# Patient Record
Sex: FEMALE | Race: WHITE | NOT HISPANIC OR LATINO | Employment: OTHER | URBAN - METROPOLITAN AREA
[De-identification: names, ages, dates, MRNs, and addresses within clinical notes are randomized per-mention and may not be internally consistent; named-entity substitution may affect disease eponyms.]

---

## 2017-01-03 ENCOUNTER — HISTORICAL (OUTPATIENT)
Dept: ADMINISTRATIVE | Facility: HOSPITAL | Age: 77
End: 2017-01-03

## 2017-02-06 ENCOUNTER — HISTORICAL (OUTPATIENT)
Dept: ADMINISTRATIVE | Facility: HOSPITAL | Age: 77
End: 2017-02-06

## 2017-05-04 ENCOUNTER — HISTORICAL (OUTPATIENT)
Dept: ADMINISTRATIVE | Facility: HOSPITAL | Age: 77
End: 2017-05-04

## 2017-05-04 LAB
ABS NEUT (OLG): 8.11 X10(3)/MCL (ref 2.1–9.2)
ALBUMIN SERPL-MCNC: 3.9 GM/DL (ref 3.4–5)
ALBUMIN/GLOB SERPL: 1.1 RATIO (ref 1.1–2)
ALP SERPL-CCNC: 100 UNIT/L (ref 38–126)
ALT SERPL-CCNC: 19 UNIT/L (ref 12–78)
AST SERPL-CCNC: 17 UNIT/L (ref 15–37)
BASOPHILS # BLD AUTO: 0.1 X10(3)/MCL (ref 0–0.2)
BASOPHILS NFR BLD AUTO: 1 %
BILIRUB SERPL-MCNC: 0.3 MG/DL (ref 0.2–1)
BILIRUBIN DIRECT+TOT PNL SERPL-MCNC: 0.1 MG/DL (ref 0–0.5)
BILIRUBIN DIRECT+TOT PNL SERPL-MCNC: 0.2 MG/DL (ref 0–0.8)
BUN SERPL-MCNC: 23 MG/DL (ref 7–18)
CALCIUM SERPL-MCNC: 9.1 MG/DL (ref 8.5–10.1)
CHLORIDE SERPL-SCNC: 104 MMOL/L (ref 98–107)
CO2 SERPL-SCNC: 27 MMOL/L (ref 21–32)
CREAT SERPL-MCNC: 1.31 MG/DL (ref 0.55–1.02)
DEPRECATED CALCIDIOL+CALCIFEROL SERPL-MC: 32.93 NG/ML (ref 30–80)
EOSINOPHIL # BLD AUTO: 0.2 X10(3)/MCL (ref 0–0.9)
EOSINOPHIL NFR BLD AUTO: 2 %
ERYTHROCYTE [DISTWIDTH] IN BLOOD BY AUTOMATED COUNT: 13.9 % (ref 11.5–17)
GLOBULIN SER-MCNC: 3.4 GM/DL (ref 2.4–3.5)
GLUCOSE SERPL-MCNC: 122 MG/DL (ref 74–106)
HCT VFR BLD AUTO: 37.3 % (ref 37–47)
HGB BLD-MCNC: 11.4 GM/DL (ref 12–16)
IRON SATN MFR SERPL: 8 % (ref 20–50)
IRON SERPL-MCNC: 28 MCG/DL (ref 50–175)
LYMPHOCYTES # BLD AUTO: 3.6 X10(3)/MCL (ref 0.6–4.6)
LYMPHOCYTES NFR BLD AUTO: 28 %
MCH RBC QN AUTO: 26 PG (ref 27–31)
MCHC RBC AUTO-ENTMCNC: 30.6 GM/DL (ref 33–36)
MCV RBC AUTO: 85 FL (ref 80–94)
MONOCYTES # BLD AUTO: 0.9 X10(3)/MCL (ref 0.1–1.3)
MONOCYTES NFR BLD AUTO: 7 %
NEUTROPHILS # BLD AUTO: 8.11 X10(3)/MCL (ref 2.1–9.2)
NEUTROPHILS NFR BLD AUTO: 62 %
PLATELET # BLD AUTO: 469 X10(3)/MCL (ref 130–400)
PMV BLD AUTO: 11 FL (ref 9.4–12.4)
POTASSIUM SERPL-SCNC: 4.8 MMOL/L (ref 3.5–5.1)
PROT SERPL-MCNC: 7.3 GM/DL (ref 6.4–8.2)
RBC # BLD AUTO: 4.39 X10(6)/MCL (ref 4.2–5.4)
SODIUM SERPL-SCNC: 142 MMOL/L (ref 136–145)
TIBC SERPL-MCNC: 351 UG/DL
TRANSFERRIN SERPL-MCNC: 297 MG/DL (ref 200–360)
VIT B12 SERPL-MCNC: 364 PG/ML (ref 193–986)
WBC # SPEC AUTO: 13 X10(3)/MCL (ref 4.5–11.5)

## 2017-06-05 ENCOUNTER — HISTORICAL (OUTPATIENT)
Dept: ADMINISTRATIVE | Facility: HOSPITAL | Age: 77
End: 2017-06-05

## 2017-06-05 LAB
ABS NEUT (OLG): 6.52 X10(3)/MCL (ref 2.1–9.2)
BASOPHILS # BLD AUTO: 0.1 X10(3)/MCL (ref 0–0.2)
BASOPHILS NFR BLD AUTO: 1 %
EOSINOPHIL # BLD AUTO: 0.1 X10(3)/MCL (ref 0–0.9)
EOSINOPHIL NFR BLD AUTO: 1 %
ERYTHROCYTE [DISTWIDTH] IN BLOOD BY AUTOMATED COUNT: 14.6 % (ref 11.5–17)
HCT VFR BLD AUTO: 36.6 % (ref 37–47)
HGB BLD-MCNC: 11.3 GM/DL (ref 12–16)
IRON SATN MFR SERPL: 17.1 % (ref 20–50)
IRON SERPL-MCNC: 57 MCG/DL (ref 50–175)
LYMPHOCYTES # BLD AUTO: 3.3 X10(3)/MCL (ref 0.6–4.6)
LYMPHOCYTES NFR BLD AUTO: 30 %
MCH RBC QN AUTO: 26.1 PG (ref 27–31)
MCHC RBC AUTO-ENTMCNC: 30.9 GM/DL (ref 33–36)
MCV RBC AUTO: 84.5 FL (ref 80–94)
MONOCYTES # BLD AUTO: 0.7 X10(3)/MCL (ref 0.1–1.3)
MONOCYTES NFR BLD AUTO: 7 %
NEUTROPHILS # BLD AUTO: 6.52 X10(3)/MCL (ref 2.1–9.2)
NEUTROPHILS NFR BLD AUTO: 60 %
PLATELET # BLD AUTO: 418 X10(3)/MCL (ref 130–400)
PMV BLD AUTO: 11.5 FL (ref 9.4–12.4)
RBC # BLD AUTO: 4.33 X10(6)/MCL (ref 4.2–5.4)
TIBC SERPL-MCNC: 333 MCG/DL (ref 250–450)
TRANSFERRIN SERPL-MCNC: 271 MG/DL (ref 200–360)
WBC # SPEC AUTO: 10.8 X10(3)/MCL (ref 4.5–11.5)

## 2017-07-14 ENCOUNTER — HISTORICAL (OUTPATIENT)
Dept: ADMINISTRATIVE | Facility: HOSPITAL | Age: 77
End: 2017-07-14

## 2017-07-14 LAB
BUN SERPL-MCNC: 21 MG/DL (ref 7–18)
CALCIUM SERPL-MCNC: 8.8 MG/DL (ref 8.5–10.1)
CHLORIDE SERPL-SCNC: 103 MMOL/L (ref 98–107)
CHOLEST SERPL-MCNC: 137 MG/DL (ref 0–200)
CHOLEST/HDLC SERPL: 2.1 {RATIO} (ref 0–4)
CO2 SERPL-SCNC: 26 MMOL/L (ref 21–32)
CREAT SERPL-MCNC: 1.06 MG/DL (ref 0.55–1.02)
EST. AVERAGE GLUCOSE BLD GHB EST-MCNC: 183 MG/DL
GLUCOSE SERPL-MCNC: 200 MG/DL (ref 74–106)
HBA1C MFR BLD: 8 % (ref 4.2–6.3)
HDLC SERPL-MCNC: 64 MG/DL (ref 35–60)
LDLC SERPL CALC-MCNC: 41 MG/DL (ref 0–129)
POTASSIUM SERPL-SCNC: 4.5 MMOL/L (ref 3.5–5.1)
SODIUM SERPL-SCNC: 142 MMOL/L (ref 136–145)
TRIGL SERPL-MCNC: 158 MG/DL (ref 30–150)
VLDLC SERPL CALC-MCNC: 32 MG/DL

## 2017-09-30 LAB — RAPID GROUP A STREP (OHS): NEGATIVE

## 2017-10-16 ENCOUNTER — HISTORICAL (OUTPATIENT)
Dept: ADMINISTRATIVE | Facility: HOSPITAL | Age: 77
End: 2017-10-16

## 2017-10-16 LAB
BUN SERPL-MCNC: 20 MG/DL (ref 7–18)
CALCIUM SERPL-MCNC: 8.8 MG/DL (ref 8.5–10.1)
CHLORIDE SERPL-SCNC: 102 MMOL/L (ref 98–107)
CO2 SERPL-SCNC: 28 MMOL/L (ref 21–32)
CREAT SERPL-MCNC: 1.24 MG/DL (ref 0.55–1.02)
EST. AVERAGE GLUCOSE BLD GHB EST-MCNC: 169 MG/DL
GLUCOSE SERPL-MCNC: 80 MG/DL (ref 74–106)
HBA1C MFR BLD: 7.5 % (ref 4.2–6.3)
POTASSIUM SERPL-SCNC: 4.6 MMOL/L (ref 3.5–5.1)
SODIUM SERPL-SCNC: 139 MMOL/L (ref 136–145)

## 2017-12-06 ENCOUNTER — HISTORICAL (OUTPATIENT)
Dept: ADMINISTRATIVE | Facility: HOSPITAL | Age: 77
End: 2017-12-06

## 2017-12-06 LAB
BNP BLD-MCNC: 143 PG/ML (ref 0–125)
BUN SERPL-MCNC: 18 MG/DL (ref 7–18)
CALCIUM SERPL-MCNC: 8.3 MG/DL (ref 8.5–10.1)
CHLORIDE SERPL-SCNC: 104 MMOL/L (ref 98–107)
CO2 SERPL-SCNC: 24 MMOL/L (ref 21–32)
CREAT SERPL-MCNC: 1.13 MG/DL (ref 0.55–1.02)
GLUCOSE SERPL-MCNC: 146 MG/DL (ref 74–106)
POTASSIUM SERPL-SCNC: 4.6 MMOL/L (ref 3.5–5.1)
SODIUM SERPL-SCNC: 139 MMOL/L (ref 136–145)

## 2018-01-15 ENCOUNTER — HISTORICAL (OUTPATIENT)
Dept: ADMINISTRATIVE | Facility: HOSPITAL | Age: 78
End: 2018-01-15

## 2018-01-15 LAB
ABS NEUT (OLG): 5.1 X10(3)/MCL (ref 2.1–9.2)
ALBUMIN SERPL-MCNC: 3.6 GM/DL (ref 3.4–5)
ALBUMIN/GLOB SERPL: 1.1 RATIO (ref 1.1–2)
ALP SERPL-CCNC: 83 UNIT/L (ref 38–126)
ALT SERPL-CCNC: 17 UNIT/L (ref 12–78)
AST SERPL-CCNC: 10 UNIT/L (ref 15–37)
BASOPHILS # BLD AUTO: 0.1 X10(3)/MCL (ref 0–0.2)
BASOPHILS NFR BLD AUTO: 1 %
BILIRUB SERPL-MCNC: 0.4 MG/DL (ref 0.2–1)
BILIRUBIN DIRECT+TOT PNL SERPL-MCNC: 0.1 MG/DL (ref 0–0.5)
BILIRUBIN DIRECT+TOT PNL SERPL-MCNC: 0.3 MG/DL (ref 0–0.8)
BUN SERPL-MCNC: 17 MG/DL (ref 7–18)
CALCIUM SERPL-MCNC: 8.5 MG/DL (ref 8.5–10.1)
CHLORIDE SERPL-SCNC: 106 MMOL/L (ref 98–107)
CHOLEST SERPL-MCNC: 124 MG/DL (ref 0–200)
CHOLEST/HDLC SERPL: 2.1 {RATIO} (ref 0–4)
CO2 SERPL-SCNC: 27 MMOL/L (ref 21–32)
CREAT SERPL-MCNC: 1.1 MG/DL (ref 0.55–1.02)
EOSINOPHIL # BLD AUTO: 0.1 X10(3)/MCL (ref 0–0.9)
EOSINOPHIL NFR BLD AUTO: 1 %
ERYTHROCYTE [DISTWIDTH] IN BLOOD BY AUTOMATED COUNT: 12.9 % (ref 11.5–17)
EST. AVERAGE GLUCOSE BLD GHB EST-MCNC: 160 MG/DL
GLOBULIN SER-MCNC: 3.3 GM/DL (ref 2.4–3.5)
GLUCOSE SERPL-MCNC: 72 MG/DL (ref 74–106)
HBA1C MFR BLD: 7.2 % (ref 4.2–6.3)
HCT VFR BLD AUTO: 35.9 % (ref 37–47)
HDLC SERPL-MCNC: 59 MG/DL (ref 35–60)
HGB BLD-MCNC: 10.9 GM/DL (ref 12–16)
LDLC SERPL CALC-MCNC: 33 MG/DL (ref 0–129)
LYMPHOCYTES # BLD AUTO: 2.8 X10(3)/MCL (ref 0.6–4.6)
LYMPHOCYTES NFR BLD AUTO: 31 %
MCH RBC QN AUTO: 27.3 PG (ref 27–31)
MCHC RBC AUTO-ENTMCNC: 30.4 GM/DL (ref 33–36)
MCV RBC AUTO: 90 FL (ref 80–94)
MONOCYTES # BLD AUTO: 0.7 X10(3)/MCL (ref 0.1–1.3)
MONOCYTES NFR BLD AUTO: 8 %
NEUTROPHILS # BLD AUTO: 5.1 X10(3)/MCL (ref 1.4–7.9)
NEUTROPHILS NFR BLD AUTO: 58 %
PLATELET # BLD AUTO: 370 X10(3)/MCL (ref 130–400)
PMV BLD AUTO: 10.8 FL (ref 9.4–12.4)
POTASSIUM SERPL-SCNC: 4.6 MMOL/L (ref 3.5–5.1)
PROT SERPL-MCNC: 6.9 GM/DL (ref 6.4–8.2)
RBC # BLD AUTO: 3.99 X10(6)/MCL (ref 4.2–5.4)
SODIUM SERPL-SCNC: 142 MMOL/L (ref 136–145)
TRIGL SERPL-MCNC: 160 MG/DL (ref 30–150)
VLDLC SERPL CALC-MCNC: 32 MG/DL
WBC # SPEC AUTO: 8.8 X10(3)/MCL (ref 4.5–11.5)

## 2018-05-03 ENCOUNTER — HISTORICAL (OUTPATIENT)
Dept: ADMINISTRATIVE | Facility: HOSPITAL | Age: 78
End: 2018-05-03

## 2018-05-03 LAB
BUN SERPL-MCNC: 17 MG/DL (ref 7–18)
CALCIUM SERPL-MCNC: 8.9 MG/DL (ref 8.5–10.1)
CHLORIDE SERPL-SCNC: 104 MMOL/L (ref 98–107)
CO2 SERPL-SCNC: 29 MMOL/L (ref 21–32)
CREAT SERPL-MCNC: 1.1 MG/DL (ref 0.55–1.02)
CREAT/UREA NIT SERPL: 15.5
EST. AVERAGE GLUCOSE BLD GHB EST-MCNC: 209 MG/DL
GLUCOSE SERPL-MCNC: 134 MG/DL (ref 74–106)
HBA1C MFR BLD: 8.9 % (ref 4.2–6.3)
POTASSIUM SERPL-SCNC: 4.6 MMOL/L (ref 3.5–5.1)
SODIUM SERPL-SCNC: 141 MMOL/L (ref 136–145)

## 2018-07-03 ENCOUNTER — HISTORICAL (OUTPATIENT)
Dept: ADMINISTRATIVE | Facility: HOSPITAL | Age: 78
End: 2018-07-03

## 2018-07-03 LAB
BUN SERPL-MCNC: 19 MG/DL (ref 7–18)
CALCIUM SERPL-MCNC: 8.3 MG/DL (ref 8.5–10.1)
CHLORIDE SERPL-SCNC: 103 MMOL/L (ref 98–107)
CHOLEST SERPL-MCNC: 135 MG/DL (ref 0–200)
CHOLEST/HDLC SERPL: 2.7 {RATIO} (ref 0–4)
CO2 SERPL-SCNC: 28 MMOL/L (ref 21–32)
CREAT SERPL-MCNC: 1.12 MG/DL (ref 0.55–1.02)
CREAT/UREA NIT SERPL: 17
EST. AVERAGE GLUCOSE BLD GHB EST-MCNC: 171 MG/DL
GLUCOSE SERPL-MCNC: 101 MG/DL (ref 74–106)
HBA1C MFR BLD: 7.6 % (ref 4.2–6.3)
HDLC SERPL-MCNC: 50 MG/DL (ref 35–60)
LDLC SERPL CALC-MCNC: 53 MG/DL (ref 0–129)
POTASSIUM SERPL-SCNC: 4.5 MMOL/L (ref 3.5–5.1)
SODIUM SERPL-SCNC: 140 MMOL/L (ref 136–145)
TRIGL SERPL-MCNC: 159 MG/DL (ref 30–150)
VLDLC SERPL CALC-MCNC: 32 MG/DL

## 2019-02-06 ENCOUNTER — HISTORICAL (OUTPATIENT)
Dept: ADMINISTRATIVE | Facility: HOSPITAL | Age: 79
End: 2019-02-06

## 2019-02-06 LAB
ABS NEUT (OLG): 5.78 X10(3)/MCL (ref 2.1–9.2)
ALBUMIN SERPL-MCNC: 3.5 GM/DL (ref 3.4–5)
ALBUMIN/GLOB SERPL: 1 {RATIO}
ALP SERPL-CCNC: 92 UNIT/L (ref 38–126)
ALT SERPL-CCNC: 20 UNIT/L (ref 12–78)
APPEARANCE, UA: CLEAR
AST SERPL-CCNC: 12 UNIT/L (ref 15–37)
BACTERIA SPEC CULT: ABNORMAL /HPF
BASOPHILS # BLD AUTO: 0.1 X10(3)/MCL (ref 0–0.2)
BASOPHILS NFR BLD AUTO: 1 %
BILIRUB SERPL-MCNC: 0.4 MG/DL (ref 0.2–1)
BILIRUB UR QL STRIP: NEGATIVE
BILIRUBIN DIRECT+TOT PNL SERPL-MCNC: 0.1 MG/DL (ref 0–0.2)
BILIRUBIN DIRECT+TOT PNL SERPL-MCNC: 0.3 MG/DL (ref 0–0.8)
BUN SERPL-MCNC: 22 MG/DL (ref 7–18)
CALCIUM SERPL-MCNC: 8.7 MG/DL (ref 8.5–10.1)
CHLORIDE SERPL-SCNC: 103 MMOL/L (ref 98–107)
CHOLEST SERPL-MCNC: 181 MG/DL (ref 0–200)
CHOLEST/HDLC SERPL: 3.6 {RATIO} (ref 0–4)
CO2 SERPL-SCNC: 28 MMOL/L (ref 21–32)
COLOR UR: YELLOW
CREAT SERPL-MCNC: 1.31 MG/DL (ref 0.55–1.02)
EOSINOPHIL # BLD AUTO: 0.1 X10(3)/MCL (ref 0–0.9)
EOSINOPHIL NFR BLD AUTO: 1 %
ERYTHROCYTE [DISTWIDTH] IN BLOOD BY AUTOMATED COUNT: 13.2 % (ref 11.5–17)
EST. AVERAGE GLUCOSE BLD GHB EST-MCNC: 171 MG/DL
GLOBULIN SER-MCNC: 3.5 GM/DL (ref 2.4–3.5)
GLUCOSE (UA): NEGATIVE
GLUCOSE SERPL-MCNC: 93 MG/DL (ref 74–106)
HBA1C MFR BLD: 7.6 % (ref 4.2–6.3)
HCT VFR BLD AUTO: 40.2 % (ref 37–47)
HDLC SERPL-MCNC: 50 MG/DL (ref 35–60)
HGB BLD-MCNC: 12.3 GM/DL (ref 12–16)
HGB UR QL STRIP: NEGATIVE
KETONES UR QL STRIP: NEGATIVE
LDLC SERPL CALC-MCNC: 93 MG/DL (ref 0–129)
LEUKOCYTE ESTERASE UR QL STRIP: ABNORMAL
LYMPHOCYTES # BLD AUTO: 3.2 X10(3)/MCL (ref 0.6–4.6)
LYMPHOCYTES NFR BLD AUTO: 32 %
MCH RBC QN AUTO: 28.2 PG (ref 27–31)
MCHC RBC AUTO-ENTMCNC: 30.6 GM/DL (ref 33–36)
MCV RBC AUTO: 92.2 FL (ref 80–94)
MONOCYTES # BLD AUTO: 0.7 X10(3)/MCL (ref 0.1–1.3)
MONOCYTES NFR BLD AUTO: 7 %
NEUTROPHILS # BLD AUTO: 5.78 X10(3)/MCL (ref 2.1–9.2)
NEUTROPHILS NFR BLD AUTO: 58 %
NITRITE UR QL STRIP: NEGATIVE
PH UR STRIP: 5.5 [PH] (ref 5–9)
PLATELET # BLD AUTO: 390 X10(3)/MCL (ref 130–400)
PMV BLD AUTO: 10.9 FL (ref 9.4–12.4)
POTASSIUM SERPL-SCNC: 4.7 MMOL/L (ref 3.5–5.1)
PROT SERPL-MCNC: 7 GM/DL (ref 6.4–8.2)
PROT UR QL STRIP: NEGATIVE
RBC # BLD AUTO: 4.36 X10(6)/MCL (ref 4.2–5.4)
RBC #/AREA URNS HPF: ABNORMAL /[HPF]
SODIUM SERPL-SCNC: 139 MMOL/L (ref 136–145)
SP GR UR STRIP: 1.02 (ref 1–1.03)
SQUAMOUS EPITHELIAL, UA: ABNORMAL
TRIGL SERPL-MCNC: 188 MG/DL (ref 30–150)
UROBILINOGEN UR STRIP-ACNC: 0.2
VLDLC SERPL CALC-MCNC: 38 MG/DL
WBC # SPEC AUTO: 9.9 X10(3)/MCL (ref 4.5–11.5)
WBC #/AREA URNS HPF: ABNORMAL /[HPF]

## 2019-08-06 ENCOUNTER — HISTORICAL (OUTPATIENT)
Dept: ADMINISTRATIVE | Facility: HOSPITAL | Age: 79
End: 2019-08-06

## 2019-08-06 LAB
ABS NEUT (OLG): 5.65 X10(3)/MCL (ref 2.1–9.2)
BASOPHILS # BLD AUTO: 0.1 X10(3)/MCL (ref 0–0.2)
BASOPHILS NFR BLD AUTO: 1 %
BUN SERPL-MCNC: 21 MG/DL (ref 7–18)
CALCIUM SERPL-MCNC: 7.9 MG/DL (ref 8.5–10.1)
CHLORIDE SERPL-SCNC: 99 MMOL/L (ref 98–107)
CHOLEST SERPL-MCNC: 182 MG/DL (ref 0–200)
CHOLEST/HDLC SERPL: 3.5 {RATIO} (ref 0–4)
CO2 SERPL-SCNC: 28 MMOL/L (ref 21–32)
CREAT SERPL-MCNC: 1.31 MG/DL (ref 0.55–1.02)
CREAT/UREA NIT SERPL: 16
EOSINOPHIL # BLD AUTO: 0.1 X10(3)/MCL (ref 0–0.9)
EOSINOPHIL NFR BLD AUTO: 1 %
ERYTHROCYTE [DISTWIDTH] IN BLOOD BY AUTOMATED COUNT: 13 % (ref 11.5–17)
EST. AVERAGE GLUCOSE BLD GHB EST-MCNC: 192 MG/DL
GLUCOSE SERPL-MCNC: 298 MG/DL (ref 74–106)
HBA1C MFR BLD: 8.3 % (ref 4.2–6.3)
HCT VFR BLD AUTO: 39.8 % (ref 37–47)
HDLC SERPL-MCNC: 52 MG/DL (ref 35–60)
HGB BLD-MCNC: 12.4 GM/DL (ref 12–16)
LDLC SERPL CALC-MCNC: 78 MG/DL (ref 0–129)
LYMPHOCYTES # BLD AUTO: 2.7 X10(3)/MCL (ref 0.6–4.6)
LYMPHOCYTES NFR BLD AUTO: 30 %
MCH RBC QN AUTO: 28.4 PG (ref 27–31)
MCHC RBC AUTO-ENTMCNC: 31.2 GM/DL (ref 33–36)
MCV RBC AUTO: 91.3 FL (ref 80–94)
MONOCYTES # BLD AUTO: 0.6 X10(3)/MCL (ref 0.1–1.3)
MONOCYTES NFR BLD AUTO: 6 %
NEUTROPHILS # BLD AUTO: 5.65 X10(3)/MCL (ref 2.1–9.2)
NEUTROPHILS NFR BLD AUTO: 61 %
NRBC BLD AUTO-RTO: 0 % (ref 0–0.2)
PLATELET # BLD AUTO: 392 X10(3)/MCL (ref 130–400)
PMV BLD AUTO: 10.4 FL (ref 9.4–12.4)
POTASSIUM SERPL-SCNC: 5.2 MMOL/L (ref 3.5–5.1)
RBC # BLD AUTO: 4.36 X10(6)/MCL (ref 4.2–5.4)
SODIUM SERPL-SCNC: 135 MMOL/L (ref 136–145)
TRIGL SERPL-MCNC: 261 MG/DL (ref 30–150)
TSH SERPL-ACNC: 3.21 MIU/L (ref 0.36–3.74)
VLDLC SERPL CALC-MCNC: 52 MG/DL
WBC # SPEC AUTO: 9.2 X10(3)/MCL (ref 4.5–11.5)

## 2020-01-03 ENCOUNTER — HISTORICAL (OUTPATIENT)
Dept: ADMINISTRATIVE | Facility: HOSPITAL | Age: 80
End: 2020-01-03

## 2020-01-03 LAB
CHOLEST SERPL-MCNC: 192 MG/DL (ref 0–200)
CHOLEST/HDLC SERPL: 3.5 {RATIO} (ref 0–4)
HDLC SERPL-MCNC: 55 MG/DL (ref 35–60)
LDLC SERPL CALC-MCNC: 70 MG/DL (ref 0–129)
TRIGL SERPL-MCNC: 333 MG/DL (ref 30–150)
VLDLC SERPL CALC-MCNC: 67 MG/DL

## 2020-02-24 ENCOUNTER — HISTORICAL (OUTPATIENT)
Dept: ADMINISTRATIVE | Facility: HOSPITAL | Age: 80
End: 2020-02-24

## 2020-02-24 LAB
BUN SERPL-MCNC: 18 MG/DL (ref 7–18)
CALCIUM SERPL-MCNC: 8.5 MG/DL (ref 8.5–10.1)
CHLORIDE SERPL-SCNC: 100 MMOL/L (ref 98–107)
CHOLEST SERPL-MCNC: 175 MG/DL (ref 0–200)
CHOLEST/HDLC SERPL: 3.4 {RATIO} (ref 0–4)
CO2 SERPL-SCNC: 27 MMOL/L (ref 21–32)
CREAT SERPL-MCNC: 1.22 MG/DL (ref 0.55–1.02)
CREAT/UREA NIT SERPL: 14.8
EST. AVERAGE GLUCOSE BLD GHB EST-MCNC: 206 MG/DL
GLUCOSE SERPL-MCNC: 272 MG/DL (ref 74–106)
HBA1C MFR BLD: 8.8 % (ref 4.2–6.3)
HDLC SERPL-MCNC: 52 MG/DL (ref 35–60)
LDLC SERPL CALC-MCNC: 82 MG/DL (ref 0–129)
POTASSIUM SERPL-SCNC: 4.7 MMOL/L (ref 3.5–5.1)
SODIUM SERPL-SCNC: 136 MMOL/L (ref 136–145)
TRIGL SERPL-MCNC: 205 MG/DL (ref 30–150)
VLDLC SERPL CALC-MCNC: 41 MG/DL

## 2020-03-16 ENCOUNTER — HISTORICAL (OUTPATIENT)
Dept: RADIOLOGY | Facility: HOSPITAL | Age: 80
End: 2020-03-16

## 2020-04-22 ENCOUNTER — HISTORICAL (OUTPATIENT)
Dept: RADIOLOGY | Facility: HOSPITAL | Age: 80
End: 2020-04-22

## 2020-04-22 LAB — POC CREATININE: 1.3 MG/DL (ref 0.6–1.3)

## 2020-05-01 LAB
ABS NEUT (OLG): 6.6 X10(3)/MCL (ref 2.1–9.2)
ALBUMIN SERPL-MCNC: 3.7 GM/DL (ref 3.4–5)
ALBUMIN/GLOB SERPL: 1.1 RATIO (ref 1.1–2)
ALP SERPL-CCNC: 92 UNIT/L (ref 40–150)
ALT SERPL-CCNC: 17 UNIT/L (ref 0–55)
AST SERPL-CCNC: 19 UNIT/L (ref 5–34)
BASOPHILS # BLD AUTO: 0.1 X10(3)/MCL (ref 0–0.2)
BASOPHILS NFR BLD AUTO: 1 %
BILIRUB SERPL-MCNC: 0.3 MG/DL
BILIRUBIN DIRECT+TOT PNL SERPL-MCNC: 0.1 MG/DL (ref 0–0.5)
BILIRUBIN DIRECT+TOT PNL SERPL-MCNC: 0.2 MG/DL (ref 0–0.8)
BUN SERPL-MCNC: 18 MG/DL (ref 9.8–20.1)
CALCIUM SERPL-MCNC: 8.9 MG/DL (ref 8.4–10.2)
CHLORIDE SERPL-SCNC: 103 MMOL/L (ref 98–107)
CO2 SERPL-SCNC: 25 MMOL/L (ref 23–31)
CREAT SERPL-MCNC: 1.26 MG/DL (ref 0.55–1.02)
EOSINOPHIL # BLD AUTO: 0.1 X10(3)/MCL (ref 0–0.9)
EOSINOPHIL NFR BLD AUTO: 1 %
ERYTHROCYTE [DISTWIDTH] IN BLOOD BY AUTOMATED COUNT: 12.4 % (ref 11.5–17)
GLOBULIN SER-MCNC: 3.5 GM/DL (ref 2.4–3.5)
GLUCOSE SERPL-MCNC: 203 MG/DL (ref 82–115)
HCT VFR BLD AUTO: 40.5 % (ref 37–47)
HGB BLD-MCNC: 12.3 GM/DL (ref 12–16)
INR PPP: 1 (ref 0–1.3)
LYMPHOCYTES # BLD AUTO: 3.3 X10(3)/MCL (ref 0.6–4.6)
LYMPHOCYTES NFR BLD AUTO: 30 %
MCH RBC QN AUTO: 28.1 PG (ref 27–31)
MCHC RBC AUTO-ENTMCNC: 30.4 GM/DL (ref 33–36)
MCV RBC AUTO: 92.5 FL (ref 80–94)
MONOCYTES # BLD AUTO: 0.7 X10(3)/MCL (ref 0.1–1.3)
MONOCYTES NFR BLD AUTO: 6 %
NEUTROPHILS # BLD AUTO: 6.6 X10(3)/MCL (ref 2.1–9.2)
NEUTROPHILS NFR BLD AUTO: 61 %
PLATELET # BLD AUTO: 452 X10(3)/MCL (ref 130–400)
PMV BLD AUTO: 10.5 FL (ref 9.4–12.4)
POTASSIUM SERPL-SCNC: 5.2 MMOL/L (ref 3.5–5.1)
PROT SERPL-MCNC: 7.2 GM/DL (ref 5.8–7.6)
PROTHROMBIN TIME: 12.7 SECOND(S) (ref 11.1–13.7)
RBC # BLD AUTO: 4.38 X10(6)/MCL (ref 4.2–5.4)
SODIUM SERPL-SCNC: 140 MMOL/L (ref 136–145)
WBC # SPEC AUTO: 10.9 X10(3)/MCL (ref 4.5–11.5)

## 2020-05-06 ENCOUNTER — HISTORICAL (OUTPATIENT)
Dept: ADMINISTRATIVE | Facility: HOSPITAL | Age: 80
End: 2020-05-06

## 2020-05-06 LAB
ABS NEUT (OLG): 7.92 X10(3)/MCL (ref 2.1–9.2)
ALBUMIN SERPL-MCNC: 3.8 GM/DL (ref 3.4–4.8)
ALBUMIN/GLOB SERPL: 0.9 RATIO (ref 1.1–2)
ALP SERPL-CCNC: 85 UNIT/L (ref 40–150)
ALT SERPL-CCNC: 16 UNIT/L (ref 0–55)
APTT PPP: 34.9 SECOND(S) (ref 23.2–33.7)
AST SERPL-CCNC: 22 UNIT/L (ref 5–34)
BASOPHILS # BLD AUTO: 0.1 X10(3)/MCL (ref 0–0.2)
BASOPHILS NFR BLD AUTO: 1 %
BILIRUB SERPL-MCNC: 0.6 MG/DL
BILIRUBIN DIRECT+TOT PNL SERPL-MCNC: 0.2 MG/DL (ref 0–0.5)
BILIRUBIN DIRECT+TOT PNL SERPL-MCNC: 0.4 MG/DL (ref 0–0.8)
BUN SERPL-MCNC: 17 MG/DL (ref 9.8–20.1)
CALCIUM SERPL-MCNC: 8.7 MG/DL (ref 8.4–10.2)
CHLORIDE SERPL-SCNC: 101 MMOL/L (ref 98–107)
CO2 SERPL-SCNC: 19 MMOL/L (ref 23–31)
CREAT SERPL-MCNC: 1.31 MG/DL (ref 0.55–1.02)
EOSINOPHIL # BLD AUTO: 0.1 X10(3)/MCL (ref 0–0.9)
EOSINOPHIL NFR BLD AUTO: 1 %
ERYTHROCYTE [DISTWIDTH] IN BLOOD BY AUTOMATED COUNT: 12.7 % (ref 11.5–17)
GLOBULIN SER-MCNC: 4.3 GM/DL (ref 2.4–3.5)
GLUCOSE SERPL-MCNC: 336 MG/DL (ref 82–115)
HCT VFR BLD AUTO: 40.4 % (ref 37–47)
HGB BLD-MCNC: 12.6 GM/DL (ref 12–16)
INR PPP: 1 (ref 0–1.3)
LYMPHOCYTES # BLD AUTO: 3.5 X10(3)/MCL (ref 0.6–4.6)
LYMPHOCYTES NFR BLD AUTO: 28 %
MCH RBC QN AUTO: 28.6 PG (ref 27–31)
MCHC RBC AUTO-ENTMCNC: 31.2 GM/DL (ref 33–36)
MCV RBC AUTO: 91.6 FL (ref 80–94)
MONOCYTES # BLD AUTO: 1 X10(3)/MCL (ref 0.1–1.3)
MONOCYTES NFR BLD AUTO: 8 %
NEUTROPHILS # BLD AUTO: 7.92 X10(3)/MCL (ref 2.1–9.2)
NEUTROPHILS NFR BLD AUTO: 63 %
PLATELET # BLD AUTO: 460 X10(3)/MCL (ref 130–400)
PMV BLD AUTO: 10.8 FL (ref 9.4–12.4)
POTASSIUM SERPL-SCNC: 4.5 MMOL/L (ref 3.5–5.1)
PROT SERPL-MCNC: 8.1 GM/DL (ref 5.8–7.6)
PROTHROMBIN TIME: 12.9 SECOND(S) (ref 11.1–13.7)
RBC # BLD AUTO: 4.41 X10(6)/MCL (ref 4.2–5.4)
SODIUM SERPL-SCNC: 136 MMOL/L (ref 136–145)
WBC # SPEC AUTO: 12.7 X10(3)/MCL (ref 4.5–11.5)

## 2020-06-01 ENCOUNTER — HISTORICAL (OUTPATIENT)
Dept: ADMINISTRATIVE | Facility: HOSPITAL | Age: 80
End: 2020-06-01

## 2020-06-01 LAB
ALBUMIN SERPL-MCNC: 3.5 GM/DL (ref 3.4–4.8)
ALBUMIN/GLOB SERPL: 1.1 RATIO (ref 1.1–2)
ALP SERPL-CCNC: 83 UNIT/L (ref 40–150)
ALT SERPL-CCNC: 13 UNIT/L (ref 0–55)
AST SERPL-CCNC: 15 UNIT/L (ref 5–34)
BILIRUB SERPL-MCNC: 0.4 MG/DL
BILIRUBIN DIRECT+TOT PNL SERPL-MCNC: 0.2 MG/DL (ref 0–0.5)
BILIRUBIN DIRECT+TOT PNL SERPL-MCNC: 0.2 MG/DL (ref 0–0.8)
BUN SERPL-MCNC: 20.9 MG/DL (ref 9.8–20.1)
CALCIUM SERPL-MCNC: 7.8 MG/DL (ref 8.4–10.2)
CHLORIDE SERPL-SCNC: 103 MMOL/L (ref 98–107)
CO2 SERPL-SCNC: 23 MMOL/L (ref 23–31)
CREAT SERPL-MCNC: 1.47 MG/DL (ref 0.55–1.02)
EST. AVERAGE GLUCOSE BLD GHB EST-MCNC: 159.9 MG/DL
GLOBULIN SER-MCNC: 3.1 GM/DL (ref 2.4–3.5)
GLUCOSE SERPL-MCNC: 244 MG/DL (ref 82–115)
HBA1C MFR BLD: 7.2 %
POTASSIUM SERPL-SCNC: 5.3 MMOL/L (ref 3.5–5.1)
PROT SERPL-MCNC: 6.6 GM/DL (ref 5.8–7.6)
SODIUM SERPL-SCNC: 137 MMOL/L (ref 136–145)

## 2020-06-02 ENCOUNTER — HISTORICAL (OUTPATIENT)
Dept: ADMINISTRATIVE | Facility: HOSPITAL | Age: 80
End: 2020-06-02

## 2020-06-02 LAB
AMYLASE SERPL-CCNC: 25 UNIT/L (ref 20–160)
BUN SERPL-MCNC: 22.7 MG/DL (ref 9.8–20.1)
CALCIUM SERPL-MCNC: 8.4 MG/DL (ref 8.4–10.2)
CHLORIDE SERPL-SCNC: 105 MMOL/L (ref 98–107)
CO2 SERPL-SCNC: 24 MMOL/L (ref 23–31)
CREAT SERPL-MCNC: 1.25 MG/DL (ref 0.55–1.02)
CREAT/UREA NIT SERPL: 18
GLUCOSE SERPL-MCNC: 115 MG/DL (ref 82–115)
LIPASE SERPL-CCNC: 14 U/L
MAGNESIUM SERPL-MCNC: 0.96 MG/DL (ref 1.6–2.6)
POTASSIUM SERPL-SCNC: 5.2 MMOL/L (ref 3.5–5.1)
PTH-INTACT SERPL-MCNC: 96.3 PG/ML (ref 8.7–77.1)
SODIUM SERPL-SCNC: 141 MMOL/L (ref 136–145)
TSH SERPL-ACNC: 2.32 UIU/ML (ref 0.35–4.94)

## 2020-06-24 ENCOUNTER — HISTORICAL (OUTPATIENT)
Dept: ADMINISTRATIVE | Facility: HOSPITAL | Age: 80
End: 2020-06-24

## 2020-06-24 LAB — MAGNESIUM SERPL-MCNC: 1.25 MG/DL (ref 1.6–2.6)

## 2020-07-02 ENCOUNTER — HISTORICAL (OUTPATIENT)
Dept: ADMINISTRATIVE | Facility: HOSPITAL | Age: 80
End: 2020-07-02

## 2020-07-02 LAB — MAGNESIUM SERPL-MCNC: 1.6 MG/DL (ref 1.6–2.6)

## 2020-08-21 ENCOUNTER — HISTORICAL (OUTPATIENT)
Dept: RADIOLOGY | Facility: HOSPITAL | Age: 80
End: 2020-08-21

## 2020-08-21 LAB — POC CREATININE: 1.4 MG/DL (ref 0.6–1.3)

## 2020-08-25 ENCOUNTER — HISTORICAL (OUTPATIENT)
Dept: ADMINISTRATIVE | Facility: HOSPITAL | Age: 80
End: 2020-08-25

## 2020-08-25 LAB
ABS NEUT (OLG): 6.23 X10(3)/MCL (ref 2.1–9.2)
BASOPHILS # BLD AUTO: 0.1 X10(3)/MCL (ref 0–0.2)
BASOPHILS NFR BLD AUTO: 1 %
EOSINOPHIL # BLD AUTO: 0.1 X10(3)/MCL (ref 0–0.9)
EOSINOPHIL NFR BLD AUTO: 1 %
ERYTHROCYTE [DISTWIDTH] IN BLOOD BY AUTOMATED COUNT: 12.5 % (ref 11.5–17)
HCT VFR BLD AUTO: 39.1 % (ref 37–47)
HGB BLD-MCNC: 12.2 GM/DL (ref 12–16)
LYMPHOCYTES # BLD AUTO: 2.9 X10(3)/MCL (ref 0.6–4.6)
LYMPHOCYTES NFR BLD AUTO: 28 %
MAGNESIUM SERPL-MCNC: 1.4 MG/DL (ref 1.6–2.6)
MCH RBC QN AUTO: 28.6 PG (ref 27–31)
MCHC RBC AUTO-ENTMCNC: 31.2 GM/DL (ref 33–36)
MCV RBC AUTO: 91.6 FL (ref 80–94)
MONOCYTES # BLD AUTO: 0.8 X10(3)/MCL (ref 0.1–1.3)
MONOCYTES NFR BLD AUTO: 8 %
NEUTROPHILS # BLD AUTO: 6.23 X10(3)/MCL (ref 2.1–9.2)
NEUTROPHILS NFR BLD AUTO: 61 %
PLATELET # BLD AUTO: 406 X10(3)/MCL (ref 130–400)
PMV BLD AUTO: 10.4 FL (ref 9.4–12.4)
RBC # BLD AUTO: 4.27 X10(6)/MCL (ref 4.2–5.4)
WBC # SPEC AUTO: 10.2 X10(3)/MCL (ref 4.5–11.5)

## 2020-09-09 ENCOUNTER — HISTORICAL (OUTPATIENT)
Dept: ADMINISTRATIVE | Facility: HOSPITAL | Age: 80
End: 2020-09-09

## 2020-09-09 LAB
CHOLEST SERPL-MCNC: 178 MG/DL
CHOLEST/HDLC SERPL: 4 {RATIO} (ref 0–5)
HDLC SERPL-MCNC: 47 MG/DL (ref 35–60)
LDLC SERPL CALC-MCNC: 77 MG/DL (ref 50–140)
TRIGL SERPL-MCNC: 271 MG/DL (ref 37–140)
VLDLC SERPL CALC-MCNC: 54 MG/DL

## 2021-02-23 ENCOUNTER — HISTORICAL (OUTPATIENT)
Dept: ADMINISTRATIVE | Facility: HOSPITAL | Age: 81
End: 2021-02-23

## 2021-02-23 LAB
ABS NEUT (OLG): 6.72 X10(3)/MCL (ref 2.1–9.2)
ALBUMIN SERPL-MCNC: 3.8 GM/DL (ref 3.4–4.8)
ALBUMIN/GLOB SERPL: 1.2 RATIO (ref 1.1–2)
ALP SERPL-CCNC: 82 UNIT/L (ref 40–150)
ALT SERPL-CCNC: 15 UNIT/L (ref 0–55)
AMYLASE SERPL-CCNC: 21 UNIT/L (ref 20–160)
AST SERPL-CCNC: 18 UNIT/L (ref 5–34)
BASOPHILS # BLD AUTO: 0.2 X10(3)/MCL (ref 0–0.2)
BASOPHILS NFR BLD AUTO: 1 %
BILIRUB SERPL-MCNC: 0.4 MG/DL
BILIRUBIN DIRECT+TOT PNL SERPL-MCNC: 0.2 MG/DL (ref 0–0.5)
BILIRUBIN DIRECT+TOT PNL SERPL-MCNC: 0.2 MG/DL (ref 0–0.8)
BUN SERPL-MCNC: 18.1 MG/DL (ref 9.8–20.1)
CALCIUM SERPL-MCNC: 8.3 MG/DL (ref 8.4–10.2)
CHLORIDE SERPL-SCNC: 107 MMOL/L (ref 98–107)
CO2 SERPL-SCNC: 24 MMOL/L (ref 23–31)
CREAT SERPL-MCNC: 1.4 MG/DL (ref 0.55–1.02)
EOSINOPHIL # BLD AUTO: 0.2 X10(3)/MCL (ref 0–0.9)
EOSINOPHIL NFR BLD AUTO: 2 %
ERYTHROCYTE [DISTWIDTH] IN BLOOD BY AUTOMATED COUNT: 13.2 % (ref 11.5–17)
GLOBULIN SER-MCNC: 3.2 GM/DL (ref 2.4–3.5)
GLUCOSE SERPL-MCNC: 41 MG/DL (ref 82–115)
HCT VFR BLD AUTO: 38.7 % (ref 37–47)
HGB BLD-MCNC: 11.8 GM/DL (ref 12–16)
LIPASE SERPL-CCNC: 7 U/L
LYMPHOCYTES # BLD AUTO: 4.1 X10(3)/MCL (ref 0.6–4.6)
LYMPHOCYTES NFR BLD AUTO: 34 %
MCH RBC QN AUTO: 28.4 PG (ref 27–31)
MCHC RBC AUTO-ENTMCNC: 30.5 GM/DL (ref 33–36)
MCV RBC AUTO: 93.3 FL (ref 80–94)
MONOCYTES # BLD AUTO: 1 X10(3)/MCL (ref 0.1–1.3)
MONOCYTES NFR BLD AUTO: 8 %
NEUTROPHILS # BLD AUTO: 6.72 X10(3)/MCL (ref 2.1–9.2)
NEUTROPHILS NFR BLD AUTO: 55 %
PLATELET # BLD AUTO: 427 X10(3)/MCL (ref 130–400)
PMV BLD AUTO: 10.8 FL (ref 9.4–12.4)
POTASSIUM SERPL-SCNC: 4.8 MMOL/L (ref 3.5–5.1)
PROT SERPL-MCNC: 7 GM/DL (ref 5.8–7.6)
RBC # BLD AUTO: 4.15 X10(6)/MCL (ref 4.2–5.4)
SODIUM SERPL-SCNC: 143 MMOL/L (ref 136–145)
WBC # SPEC AUTO: 12.3 X10(3)/MCL (ref 4.5–11.5)

## 2021-03-15 ENCOUNTER — HISTORICAL (OUTPATIENT)
Dept: ADMINISTRATIVE | Facility: HOSPITAL | Age: 81
End: 2021-03-15

## 2021-03-15 LAB
ABS NEUT (OLG): 6.02 X10(3)/MCL (ref 2.1–9.2)
ALBUMIN SERPL-MCNC: 3.7 GM/DL (ref 3.4–4.8)
ALBUMIN/GLOB SERPL: 1.2 RATIO (ref 1.1–2)
ALP SERPL-CCNC: 94 UNIT/L (ref 40–150)
ALT SERPL-CCNC: 14 UNIT/L (ref 0–55)
APPEARANCE, UA: CLEAR
AST SERPL-CCNC: 15 UNIT/L (ref 5–34)
BACTERIA SPEC CULT: ABNORMAL /HPF
BASOPHILS # BLD AUTO: 0.1 X10(3)/MCL (ref 0–0.2)
BASOPHILS NFR BLD AUTO: 1 %
BILIRUB SERPL-MCNC: 0.3 MG/DL
BILIRUB UR QL STRIP: NEGATIVE
BILIRUBIN DIRECT+TOT PNL SERPL-MCNC: 0.1 MG/DL (ref 0–0.5)
BILIRUBIN DIRECT+TOT PNL SERPL-MCNC: 0.2 MG/DL (ref 0–0.8)
BUN SERPL-MCNC: 19.7 MG/DL (ref 9.8–20.1)
CALCIUM SERPL-MCNC: 8.4 MG/DL (ref 8.4–10.2)
CHLORIDE SERPL-SCNC: 103 MMOL/L (ref 98–107)
CO2 SERPL-SCNC: 25 MMOL/L (ref 23–31)
COLOR UR: YELLOW
CREAT SERPL-MCNC: 1.4 MG/DL (ref 0.55–1.02)
DEPRECATED CALCIDIOL+CALCIFEROL SERPL-MC: 51.4 NG/ML (ref 30–80)
EOSINOPHIL # BLD AUTO: 0.2 X10(3)/MCL (ref 0–0.9)
EOSINOPHIL NFR BLD AUTO: 2 %
ERYTHROCYTE [DISTWIDTH] IN BLOOD BY AUTOMATED COUNT: 13.2 % (ref 11.5–17)
EST. AVERAGE GLUCOSE BLD GHB EST-MCNC: 165.7 MG/DL
GLOBULIN SER-MCNC: 3.1 GM/DL (ref 2.4–3.5)
GLUCOSE (UA): NEGATIVE
GLUCOSE SERPL-MCNC: 268 MG/DL (ref 82–115)
HBA1C MFR BLD: 7.4 %
HCT VFR BLD AUTO: 38.9 % (ref 37–47)
HGB BLD-MCNC: 11.9 GM/DL (ref 12–16)
HGB UR QL STRIP: NEGATIVE
KETONES UR QL STRIP: NEGATIVE
LEUKOCYTE ESTERASE UR QL STRIP: ABNORMAL
LYMPHOCYTES # BLD AUTO: 2.5 X10(3)/MCL (ref 0.6–4.6)
LYMPHOCYTES NFR BLD AUTO: 26 %
MCH RBC QN AUTO: 28.1 PG (ref 27–31)
MCHC RBC AUTO-ENTMCNC: 30.6 GM/DL (ref 33–36)
MCV RBC AUTO: 91.7 FL (ref 80–94)
MONOCYTES # BLD AUTO: 0.7 X10(3)/MCL (ref 0.1–1.3)
MONOCYTES NFR BLD AUTO: 7 %
NEUTROPHILS # BLD AUTO: 6.02 X10(3)/MCL (ref 2.1–9.2)
NEUTROPHILS NFR BLD AUTO: 63 %
NITRITE UR QL STRIP: NEGATIVE
PH UR STRIP: 6 [PH] (ref 5–9)
PLATELET # BLD AUTO: 413 X10(3)/MCL (ref 130–400)
PMV BLD AUTO: 11 FL (ref 9.4–12.4)
POTASSIUM SERPL-SCNC: 5.8 MMOL/L (ref 3.5–5.1)
PROT SERPL-MCNC: 6.8 GM/DL (ref 5.8–7.6)
PROT UR QL STRIP: NEGATIVE
RBC # BLD AUTO: 4.24 X10(6)/MCL (ref 4.2–5.4)
RBC #/AREA URNS HPF: ABNORMAL /[HPF]
SODIUM SERPL-SCNC: 138 MMOL/L (ref 136–145)
SP GR UR STRIP: 1.01 (ref 1–1.03)
SQUAMOUS EPITHELIAL, UA: ABNORMAL
TSH SERPL-ACNC: 2.9 UIU/ML (ref 0.35–4.94)
UROBILINOGEN UR STRIP-ACNC: 0.2
WBC # SPEC AUTO: 9.6 X10(3)/MCL (ref 4.5–11.5)
WBC #/AREA URNS HPF: 11 /HPF (ref 0–3)

## 2021-03-22 ENCOUNTER — HISTORICAL (OUTPATIENT)
Dept: ADMINISTRATIVE | Facility: HOSPITAL | Age: 81
End: 2021-03-22

## 2021-03-22 LAB
BUN SERPL-MCNC: 20.8 MG/DL (ref 9.8–20.1)
CALCIUM SERPL-MCNC: 9.1 MG/DL (ref 8.4–10.2)
CHLORIDE SERPL-SCNC: 97 MMOL/L (ref 98–107)
CO2 SERPL-SCNC: 26 MMOL/L (ref 23–31)
CREAT SERPL-MCNC: 1.53 MG/DL (ref 0.55–1.02)
CREAT/UREA NIT SERPL: 14
GLUCOSE SERPL-MCNC: 137 MG/DL (ref 82–115)
POTASSIUM SERPL-SCNC: 4.6 MMOL/L (ref 3.5–5.1)
SODIUM SERPL-SCNC: 137 MMOL/L (ref 136–145)

## 2021-04-05 ENCOUNTER — HISTORICAL (OUTPATIENT)
Dept: RADIOLOGY | Facility: HOSPITAL | Age: 81
End: 2021-04-05

## 2021-04-05 LAB — POC CREATININE: 1.6 MG/DL (ref 0.6–1.3)

## 2021-05-19 ENCOUNTER — HISTORICAL (OUTPATIENT)
Dept: ADMINISTRATIVE | Facility: HOSPITAL | Age: 81
End: 2021-05-19

## 2021-05-19 LAB
APPEARANCE, UA: CLEAR
BACTERIA SPEC CULT: ABNORMAL /HPF
BILIRUB UR QL STRIP: NEGATIVE
COLOR UR: YELLOW
CREAT UR-MCNC: 70.9 MG/DL (ref 45–106)
GLUCOSE (UA): NEGATIVE
HGB UR QL STRIP: NEGATIVE
KETONES UR QL STRIP: NEGATIVE
LEUKOCYTE ESTERASE UR QL STRIP: ABNORMAL
NITRITE UR QL STRIP: NEGATIVE
PH UR STRIP: 5.5 [PH] (ref 5–9)
PROT UR QL STRIP: ABNORMAL
RBC #/AREA URNS HPF: ABNORMAL /[HPF]
SP GR UR STRIP: 1.01 (ref 1–1.03)
SQUAMOUS EPITHELIAL, UA: ABNORMAL /HPF (ref 0–4)
UROBILINOGEN UR STRIP-ACNC: 0.2
WBC #/AREA URNS HPF: ABNORMAL /[HPF]

## 2021-09-21 ENCOUNTER — HISTORICAL (OUTPATIENT)
Dept: ADMINISTRATIVE | Facility: HOSPITAL | Age: 81
End: 2021-09-21

## 2021-09-21 LAB
ABS NEUT (OLG): 6.22 X10(3)/MCL (ref 2.1–9.2)
ALBUMIN SERPL-MCNC: 3.8 GM/DL (ref 3.4–4.8)
ALBUMIN/GLOB SERPL: 1.2 RATIO (ref 1.1–2)
ALP SERPL-CCNC: 88 UNIT/L (ref 40–150)
ALT SERPL-CCNC: 14 UNIT/L (ref 0–55)
APPEARANCE, UA: CLEAR
AST SERPL-CCNC: 16 UNIT/L (ref 5–34)
BACTERIA SPEC CULT: ABNORMAL /HPF
BASOPHILS # BLD AUTO: 0.1 X10(3)/MCL (ref 0–0.2)
BASOPHILS NFR BLD AUTO: 1 %
BILIRUB SERPL-MCNC: 0.3 MG/DL
BILIRUB UR QL STRIP: NEGATIVE
BILIRUBIN DIRECT+TOT PNL SERPL-MCNC: 0.1 MG/DL (ref 0–0.5)
BILIRUBIN DIRECT+TOT PNL SERPL-MCNC: 0.2 MG/DL (ref 0–0.8)
BUN SERPL-MCNC: 20.4 MG/DL (ref 9.8–20.1)
CALCIUM SERPL-MCNC: 9.2 MG/DL (ref 8.4–10.2)
CHLORIDE SERPL-SCNC: 103 MMOL/L (ref 98–107)
CHOLEST SERPL-MCNC: 155 MG/DL
CHOLEST/HDLC SERPL: 3 {RATIO} (ref 0–5)
CO2 SERPL-SCNC: 21 MMOL/L (ref 23–31)
COLOR UR: YELLOW
CREAT SERPL-MCNC: 1.33 MG/DL (ref 0.55–1.02)
EOSINOPHIL # BLD AUTO: 0.2 X10(3)/MCL (ref 0–0.9)
EOSINOPHIL NFR BLD AUTO: 2 %
ERYTHROCYTE [DISTWIDTH] IN BLOOD BY AUTOMATED COUNT: 12.9 % (ref 11.5–17)
EST. AVERAGE GLUCOSE BLD GHB EST-MCNC: 174.3 MG/DL
GLOBULIN SER-MCNC: 3.2 GM/DL (ref 2.4–3.5)
GLUCOSE (UA): NEGATIVE
GLUCOSE SERPL-MCNC: 224 MG/DL (ref 82–115)
HBA1C MFR BLD: 7.7 %
HCT VFR BLD AUTO: 36.9 % (ref 37–47)
HDLC SERPL-MCNC: 49 MG/DL (ref 35–60)
HGB BLD-MCNC: 11.7 GM/DL (ref 12–16)
HGB UR QL STRIP: NEGATIVE
KETONES UR QL STRIP: NEGATIVE
LDLC SERPL CALC-MCNC: 66 MG/DL (ref 50–140)
LEUKOCYTE ESTERASE UR QL STRIP: ABNORMAL
LYMPHOCYTES # BLD AUTO: 2.8 X10(3)/MCL (ref 0.6–4.6)
LYMPHOCYTES NFR BLD AUTO: 28 %
MCH RBC QN AUTO: 28.6 PG (ref 27–31)
MCHC RBC AUTO-ENTMCNC: 31.7 GM/DL (ref 33–36)
MCV RBC AUTO: 90.2 FL (ref 80–94)
MONOCYTES # BLD AUTO: 0.8 X10(3)/MCL (ref 0.1–1.3)
MONOCYTES NFR BLD AUTO: 8 %
NEUTROPHILS # BLD AUTO: 6.22 X10(3)/MCL (ref 2.1–9.2)
NEUTROPHILS NFR BLD AUTO: 61 %
NITRITE UR QL STRIP: NEGATIVE
PH UR STRIP: 5 [PH] (ref 5–9)
PLATELET # BLD AUTO: 446 X10(3)/MCL (ref 130–400)
PMV BLD AUTO: 10.9 FL (ref 9.4–12.4)
POTASSIUM SERPL-SCNC: 5.2 MMOL/L (ref 3.5–5.1)
PROT SERPL-MCNC: 7 GM/DL (ref 5.8–7.6)
PROT UR QL STRIP: ABNORMAL
RBC # BLD AUTO: 4.09 X10(6)/MCL (ref 4.2–5.4)
RBC #/AREA URNS HPF: ABNORMAL /[HPF]
SODIUM SERPL-SCNC: 135 MMOL/L (ref 136–145)
SP GR UR STRIP: 1.02 (ref 1–1.03)
SQUAMOUS EPITHELIAL, UA: ABNORMAL /HPF (ref 0–4)
TRIGL SERPL-MCNC: 200 MG/DL (ref 37–140)
UROBILINOGEN UR STRIP-ACNC: 0.2
VLDLC SERPL CALC-MCNC: 40 MG/DL
WBC # SPEC AUTO: 10.2 X10(3)/MCL (ref 4.5–11.5)
WBC #/AREA URNS HPF: 5 /HPF (ref 0–3)

## 2021-10-05 ENCOUNTER — HISTORICAL (OUTPATIENT)
Dept: RADIOLOGY | Facility: HOSPITAL | Age: 81
End: 2021-10-05

## 2021-10-20 ENCOUNTER — HISTORICAL (OUTPATIENT)
Dept: HEPATOLOGY | Facility: HOSPITAL | Age: 81
End: 2021-10-20

## 2022-01-01 ENCOUNTER — DOCUMENTATION ONLY (OUTPATIENT)
Dept: SURGICAL ONCOLOGY | Facility: CLINIC | Age: 82
End: 2022-01-01

## 2022-01-01 ENCOUNTER — HOSPITAL ENCOUNTER (OUTPATIENT)
Dept: RADIOLOGY | Facility: HOSPITAL | Age: 82
Discharge: HOME | End: 2022-11-29
Attending: SURGERY
Payer: MEDICARE

## 2022-01-01 ENCOUNTER — DOCUMENTATION ONLY (OUTPATIENT)
Dept: INTERNAL MEDICINE | Facility: CLINIC | Age: 82
End: 2022-01-01
Payer: MEDICARE

## 2022-01-01 ENCOUNTER — TELEPHONE (OUTPATIENT)
Dept: INTERNAL MEDICINE | Facility: CLINIC | Age: 82
End: 2022-01-01
Payer: MEDICARE

## 2022-01-01 ENCOUNTER — OFFICE VISIT (OUTPATIENT)
Dept: INTERNAL MEDICINE | Facility: CLINIC | Age: 82
End: 2022-01-01
Payer: MEDICARE

## 2022-01-01 ENCOUNTER — OFFICE VISIT (OUTPATIENT)
Dept: SURGICAL ONCOLOGY | Facility: CLINIC | Age: 82
End: 2022-01-01
Payer: MEDICARE

## 2022-01-01 ENCOUNTER — DOCUMENTATION ONLY (OUTPATIENT)
Dept: ADMINISTRATIVE | Facility: HOSPITAL | Age: 82
End: 2022-01-01
Payer: MEDICARE

## 2022-01-01 ENCOUNTER — APPOINTMENT (OUTPATIENT)
Dept: LAB | Facility: HOSPITAL | Age: 82
End: 2022-01-01
Attending: INTERNAL MEDICINE
Payer: MEDICARE

## 2022-01-01 ENCOUNTER — HISTORICAL (OUTPATIENT)
Dept: ADMINISTRATIVE | Facility: HOSPITAL | Age: 82
End: 2022-01-01
Payer: MEDICARE

## 2022-01-01 VITALS
TEMPERATURE: 97 F | WEIGHT: 199 LBS | HEART RATE: 72 BPM | BODY MASS INDEX: 36.62 KG/M2 | OXYGEN SATURATION: 95 % | HEIGHT: 62 IN | SYSTOLIC BLOOD PRESSURE: 139 MMHG | DIASTOLIC BLOOD PRESSURE: 84 MMHG

## 2022-01-01 VITALS
SYSTOLIC BLOOD PRESSURE: 112 MMHG | HEART RATE: 98 BPM | HEIGHT: 62 IN | BODY MASS INDEX: 35.81 KG/M2 | WEIGHT: 194.63 LBS | DIASTOLIC BLOOD PRESSURE: 64 MMHG

## 2022-01-01 DIAGNOSIS — I10 PRIMARY HYPERTENSION: Chronic | ICD-10-CM

## 2022-01-01 DIAGNOSIS — F32.A ANXIETY AND DEPRESSION: Chronic | ICD-10-CM

## 2022-01-01 DIAGNOSIS — N18.32 STAGE 3B CHRONIC KIDNEY DISEASE: Primary | Chronic | ICD-10-CM

## 2022-01-01 DIAGNOSIS — E11.9 DIABETES MELLITUS WITHOUT COMPLICATION: Primary | ICD-10-CM

## 2022-01-01 DIAGNOSIS — F41.9 ANXIETY AND DEPRESSION: Chronic | ICD-10-CM

## 2022-01-01 DIAGNOSIS — R80.9 PROTEINURIA, UNSPECIFIED TYPE: ICD-10-CM

## 2022-01-01 DIAGNOSIS — F51.01 PRIMARY INSOMNIA: Chronic | ICD-10-CM

## 2022-01-01 DIAGNOSIS — Z79.4 ENCOUNTER FOR LONG-TERM (CURRENT) USE OF INSULIN: ICD-10-CM

## 2022-01-01 DIAGNOSIS — M54.9 BACK PAIN, UNSPECIFIED BACK LOCATION, UNSPECIFIED BACK PAIN LATERALITY, UNSPECIFIED CHRONICITY: Primary | ICD-10-CM

## 2022-01-01 DIAGNOSIS — K86.2 PANCREATIC CYST: ICD-10-CM

## 2022-01-01 DIAGNOSIS — I10 ESSENTIAL HYPERTENSION, MALIGNANT: ICD-10-CM

## 2022-01-01 DIAGNOSIS — E87.5 HYPERKALEMIA: ICD-10-CM

## 2022-01-01 DIAGNOSIS — E78.2 MIXED HYPERLIPIDEMIA: Chronic | ICD-10-CM

## 2022-01-01 DIAGNOSIS — G25.81 RESTLESS LEG SYNDROME: Chronic | ICD-10-CM

## 2022-01-01 DIAGNOSIS — N18.32 STAGE 3B CHRONIC KIDNEY DISEASE: ICD-10-CM

## 2022-01-01 DIAGNOSIS — K21.9 GASTROESOPHAGEAL REFLUX DISEASE WITHOUT ESOPHAGITIS: Chronic | ICD-10-CM

## 2022-01-01 DIAGNOSIS — Z23 NEED FOR VACCINATION: ICD-10-CM

## 2022-01-01 DIAGNOSIS — I10 PRIMARY HYPERTENSION: Primary | Chronic | ICD-10-CM

## 2022-01-01 DIAGNOSIS — K86.2 PANCREATIC CYST: Primary | ICD-10-CM

## 2022-01-01 DIAGNOSIS — E11.65 TYPE 2 DIABETES MELLITUS WITH HYPERGLYCEMIA, WITH LONG-TERM CURRENT USE OF INSULIN: Chronic | ICD-10-CM

## 2022-01-01 DIAGNOSIS — Z79.4 TYPE 2 DIABETES MELLITUS WITH HYPERGLYCEMIA, WITH LONG-TERM CURRENT USE OF INSULIN: Chronic | ICD-10-CM

## 2022-01-01 DIAGNOSIS — E78.5 HYPERLIPIDEMIA, UNSPECIFIED HYPERLIPIDEMIA TYPE: ICD-10-CM

## 2022-01-01 DIAGNOSIS — E87.5 HYPERKALEMIA: Primary | ICD-10-CM

## 2022-01-01 DIAGNOSIS — Z79.4 TYPE 2 DIABETES MELLITUS WITHOUT COMPLICATION, WITH LONG-TERM CURRENT USE OF INSULIN: Primary | Chronic | ICD-10-CM

## 2022-01-01 DIAGNOSIS — E11.9 TYPE 2 DIABETES MELLITUS WITHOUT COMPLICATION, WITH LONG-TERM CURRENT USE OF INSULIN: Primary | Chronic | ICD-10-CM

## 2022-01-01 LAB
ALBUMIN SERPL-MCNC: 3.7 GM/DL (ref 3.4–4.8)
ALBUMIN/GLOB SERPL: 1.2 RATIO (ref 1.1–2)
ALP SERPL-CCNC: 71 UNIT/L (ref 40–150)
ALT SERPL-CCNC: 11 UNIT/L (ref 0–55)
ANION GAP SERPL CALC-SCNC: 11 MEQ/L
AST SERPL-CCNC: 15 UNIT/L (ref 5–34)
BCS RECOMMENDATION EXT: NORMAL
BILIRUBIN DIRECT+TOT PNL SERPL-MCNC: 0.4 MG/DL
BUN SERPL-MCNC: 18.9 MG/DL (ref 9.8–20.1)
BUN SERPL-MCNC: 22.5 MG/DL (ref 9.8–20.1)
CALCIUM SERPL-MCNC: 9.5 MG/DL (ref 8.4–10.2)
CALCIUM SERPL-MCNC: 9.6 MG/DL (ref 8.4–10.2)
CHLORIDE SERPL-SCNC: 101 MMOL/L (ref 98–107)
CHLORIDE SERPL-SCNC: 101 MMOL/L (ref 98–107)
CHOLEST SERPL-MSCNC: 141 MG/DL (ref 0–200)
CO2 SERPL-SCNC: 24 MMOL/L (ref 23–31)
CO2 SERPL-SCNC: 26 MMOL/L (ref 23–31)
CREAT SERPL-MCNC: 1.41 MG/DL (ref 0.55–1.02)
CREAT SERPL-MCNC: 1.42 MG/DL (ref 0.55–1.02)
CREAT UR-MCNC: 65.5 MG/DL (ref 47–110)
CREAT/UREA NIT SERPL: 16
EST. AVERAGE GLUCOSE BLD GHB EST-MCNC: 174.3 MG/DL
GFR SERPLBLD CREATININE-BSD FMLA CKD-EPI: 37 MLS/MIN/1.73/M2
GFR SERPLBLD CREATININE-BSD FMLA CKD-EPI: 38 MLS/MIN/1.73/M2
GLOBULIN SER-MCNC: 3.1 GM/DL (ref 2.4–3.5)
GLUCOSE SERPL-MCNC: 206 MG/DL (ref 82–115)
GLUCOSE SERPL-MCNC: 233 MG/DL (ref 82–115)
HBA1C MFR BLD: 7.3 % (ref 4–6)
HBA1C MFR BLD: 7.7 %
HDLC SERPL-MCNC: 53 MG/DL (ref 35–70)
LDL CHOLESTEROL DIRECT: 55 MG/DL
LEFT EYE DM RETINOPATHY: POSITIVE
MICROALBUMIN UR-MCNC: 149.2 UG/ML
MICROALBUMIN/CREAT RATIO PNL UR: 227.8 MG/GM CR (ref 0–30)
POTASSIUM SERPL-SCNC: 5.1 MMOL/L (ref 3.5–5.1)
POTASSIUM SERPL-SCNC: 5.7 MMOL/L (ref 3.5–5.1)
PROT SERPL-MCNC: 6.8 GM/DL (ref 5.8–7.6)
RIGHT EYE DM RETINOPATHY: POSITIVE
SODIUM SERPL-SCNC: 136 MMOL/L (ref 136–145)
SODIUM SERPL-SCNC: 136 MMOL/L (ref 136–145)
TRIGL SERPL-MCNC: 217 MG/DL (ref 40–160)

## 2022-01-01 PROCEDURE — 36415 COLL VENOUS BLD VENIPUNCTURE: CPT

## 2022-01-01 PROCEDURE — 99214 OFFICE O/P EST MOD 30 MIN: CPT | Mod: S$PBB,,, | Performed by: SURGERY

## 2022-01-01 PROCEDURE — 99214 OFFICE O/P EST MOD 30 MIN: CPT | Mod: ,,,

## 2022-01-01 PROCEDURE — G0008 FLU VACCINE - QUADRIVALENT - ADJUVANTED: ICD-10-PCS | Mod: ,,,

## 2022-01-01 PROCEDURE — 83036 HEMOGLOBIN GLYCOSYLATED A1C: CPT

## 2022-01-01 PROCEDURE — 82043 UR ALBUMIN QUANTITATIVE: CPT

## 2022-01-01 PROCEDURE — 99999 PR PBB SHADOW E&M-EST. PATIENT-LVL IV: ICD-10-PCS | Mod: PBBFAC,,, | Performed by: SURGERY

## 2022-01-01 PROCEDURE — 99214 PR OFFICE/OUTPT VISIT, EST, LEVL IV, 30-39 MIN: ICD-10-PCS | Mod: S$PBB,,, | Performed by: SURGERY

## 2022-01-01 PROCEDURE — 80053 COMPREHEN METABOLIC PANEL: CPT

## 2022-01-01 PROCEDURE — 74183 MRI ABD W/O CNTR FLWD CNTR: CPT | Mod: TC

## 2022-01-01 PROCEDURE — 90694 FLU VACCINE - QUADRIVALENT - ADJUVANTED: ICD-10-PCS | Mod: ,,,

## 2022-01-01 PROCEDURE — 90694 VACC AIIV4 NO PRSRV 0.5ML IM: CPT | Mod: ,,,

## 2022-01-01 PROCEDURE — 99214 PR OFFICE/OUTPT VISIT, EST, LEVL IV, 30-39 MIN: ICD-10-PCS | Mod: ,,,

## 2022-01-01 PROCEDURE — 99214 OFFICE O/P EST MOD 30 MIN: CPT | Mod: PBBFAC | Performed by: SURGERY

## 2022-01-01 PROCEDURE — G0008 ADMIN INFLUENZA VIRUS VAC: HCPCS | Mod: ,,,

## 2022-01-01 PROCEDURE — 99999 PR PBB SHADOW E&M-EST. PATIENT-LVL IV: CPT | Mod: PBBFAC,,, | Performed by: SURGERY

## 2022-01-01 PROCEDURE — A9577 INJ MULTIHANCE: HCPCS | Performed by: SURGERY

## 2022-01-01 PROCEDURE — 80048 BASIC METABOLIC PNL TOTAL CA: CPT

## 2022-01-01 PROCEDURE — 25500020 PHARM REV CODE 255: Performed by: SURGERY

## 2022-01-01 RX ORDER — MELOXICAM 7.5 MG/1
7.5 TABLET ORAL 2 TIMES DAILY
COMMUNITY
Start: 2022-07-26 | End: 2022-01-01

## 2022-01-01 RX ORDER — AMLODIPINE BESYLATE 5 MG/1
5 TABLET ORAL DAILY
Status: ON HOLD | COMMUNITY
Start: 2022-01-01 | End: 2023-01-01 | Stop reason: HOSPADM

## 2022-01-01 RX ORDER — CYCLOBENZAPRINE HCL 10 MG
10 TABLET ORAL 2 TIMES DAILY PRN
Qty: 20 TABLET | Refills: 0 | Status: SHIPPED | OUTPATIENT
Start: 2022-01-01 | End: 2022-01-01

## 2022-01-01 RX ORDER — HYDRALAZINE HYDROCHLORIDE 25 MG/1
TABLET, FILM COATED ORAL
Status: ON HOLD | COMMUNITY
Start: 2022-01-01 | End: 2023-01-01 | Stop reason: HOSPADM

## 2022-01-01 RX ORDER — ESCITALOPRAM OXALATE 10 MG/1
10 TABLET ORAL DAILY
COMMUNITY
Start: 2022-01-01 | End: 2022-01-01

## 2022-01-01 RX ORDER — METFORMIN HYDROCHLORIDE 500 MG/1
TABLET ORAL
COMMUNITY
Start: 2022-01-01 | End: 2023-01-01

## 2022-01-01 RX ORDER — METOPROLOL SUCCINATE 50 MG/1
TABLET, EXTENDED RELEASE ORAL
Qty: 60 TABLET | Refills: 4 | Status: SHIPPED | OUTPATIENT
Start: 2022-01-01 | End: 2023-01-01

## 2022-01-01 RX ORDER — AZITHROMYCIN 250 MG/1
TABLET, FILM COATED ORAL
Qty: 6 TABLET | Refills: 0 | Status: SHIPPED | OUTPATIENT
Start: 2022-01-01 | End: 2022-01-01

## 2022-01-01 RX ORDER — DAPAGLIFLOZIN 5 MG/1
5 TABLET, FILM COATED ORAL DAILY
Qty: 30 TABLET | Refills: 2 | Status: SHIPPED | OUTPATIENT
Start: 2022-01-01 | End: 2022-01-01

## 2022-01-01 RX ORDER — FLUOXETINE HYDROCHLORIDE 60 MG/1
60 TABLET, FILM COATED ORAL; ORAL DAILY
Qty: 90 TABLET | Refills: 1 | Status: ON HOLD | OUTPATIENT
Start: 2022-01-01 | End: 2023-01-01 | Stop reason: HOSPADM

## 2022-01-01 RX ORDER — GABAPENTIN 100 MG/1
100 CAPSULE ORAL 3 TIMES DAILY
COMMUNITY
Start: 2022-01-01 | End: 2023-01-01

## 2022-01-01 RX ORDER — BENZONATATE 200 MG/1
200 CAPSULE ORAL 3 TIMES DAILY PRN
Qty: 30 CAPSULE | Refills: 6 | Status: SHIPPED | OUTPATIENT
Start: 2022-01-01 | End: 2023-01-01

## 2022-01-01 RX ORDER — CYCLOBENZAPRINE HCL 10 MG
10 TABLET ORAL 3 TIMES DAILY PRN
Qty: 30 TABLET | Refills: 0 | Status: SHIPPED | OUTPATIENT
Start: 2022-01-01 | End: 2022-01-01

## 2022-01-01 RX ADMIN — GADOBENATE DIMEGLUMINE 19 ML: 529 INJECTION, SOLUTION INTRAVENOUS at 10:11

## 2022-02-21 ENCOUNTER — HISTORICAL (OUTPATIENT)
Dept: ADMINISTRATIVE | Facility: HOSPITAL | Age: 82
End: 2022-02-21

## 2022-03-15 ENCOUNTER — HISTORICAL (OUTPATIENT)
Dept: ADMINISTRATIVE | Facility: HOSPITAL | Age: 82
End: 2022-03-15

## 2022-03-15 LAB
APPEARANCE, UA: CLEAR
BACTERIA SPEC CULT: NORMAL
BILIRUB UR QL STRIP: NEGATIVE
BUDDING YEAST: NORMAL
CASTS, UA: NORMAL
COLOR UR: YELLOW
CRYSTALS: NORMAL
GLUCOSE (UA): NEGATIVE
HGB UR QL STRIP: NEGATIVE
KETONES UR QL STRIP: NEGATIVE
LEUKOCYTE ESTERASE UR QL STRIP: NORMAL
NITRITE UR QL STRIP: NEGATIVE
PH UR STRIP: 5 [PH] (ref 5–9)
PROT UR QL STRIP: NORMAL
RBC #/AREA URNS HPF: NORMAL /[HPF] (ref 0–2)
SMALL ROUND CELLS, UA: NORMAL
SP GR UR STRIP: 1.02 (ref 1–1.03)
SPERM URNS QL MICRO: NORMAL
SQUAMOUS EPITHELIAL, UA: 7 (ref 0–4)
UROBILINOGEN UR STRIP-ACNC: 0.2
WBC #/AREA URNS HPF: 10 /[HPF] (ref 0–3)

## 2022-03-30 ENCOUNTER — HISTORICAL (OUTPATIENT)
Dept: ADMINISTRATIVE | Facility: HOSPITAL | Age: 82
End: 2022-03-30

## 2022-03-30 LAB
ABS NEUT (OLG): 5.49 (ref 2.1–9.2)
ALBUMIN SERPL-MCNC: 3.6 G/DL (ref 3.4–4.8)
ALBUMIN/GLOB SERPL: 1.2 {RATIO} (ref 1.1–2)
ALP SERPL-CCNC: 85 U/L (ref 40–150)
ALT SERPL-CCNC: 10 U/L (ref 0–55)
APPEARANCE, UA: CLEAR
AST SERPL-CCNC: 13 U/L (ref 5–34)
BACTERIA SPEC CULT: NORMAL
BASOPHILS # BLD AUTO: 0.1 10*3/UL (ref 0–0.2)
BASOPHILS NFR BLD AUTO: 1 %
BILIRUB SERPL-MCNC: 0.3 MG/DL
BILIRUB UR QL STRIP: NEGATIVE
BILIRUBIN DIRECT+TOT PNL SERPL-MCNC: 0.1 (ref 0–0.8)
BILIRUBIN DIRECT+TOT PNL SERPL-MCNC: 0.2 (ref 0–0.5)
BUDDING YEAST: NORMAL
BUN SERPL-MCNC: 15.3 MG/DL (ref 9.8–20.1)
CALCIUM SERPL-MCNC: 8.9 MG/DL (ref 8.7–10.5)
CASTS, UA: NORMAL
CHLORIDE SERPL-SCNC: 97 MMOL/L (ref 98–107)
CHOLEST SERPL-MCNC: 159 MG/DL
CHOLEST/HDLC SERPL: 3 {RATIO} (ref 0–5)
CO2 SERPL-SCNC: 24 MMOL/L (ref 23–31)
COLOR UR: YELLOW
CREAT SERPL-MCNC: 1.52 MG/DL (ref 0.55–1.02)
CRYSTALS: NORMAL
EOSINOPHIL # BLD AUTO: 0.8 10*3/UL (ref 0–0.9)
EOSINOPHIL NFR BLD AUTO: 8 %
ERYTHROCYTE [DISTWIDTH] IN BLOOD BY AUTOMATED COUNT: 13.2 % (ref 11.5–17)
EST. AVERAGE GLUCOSE BLD GHB EST-MCNC: 157.1 MG/DL
GLOBULIN SER-MCNC: 3.1 G/DL (ref 2.4–3.5)
GLUCOSE (UA): NORMAL
GLUCOSE SERPL-MCNC: 229 MG/DL (ref 82–115)
HBA1C MFR BLD: 7.1 %
HCT VFR BLD AUTO: 36.5 % (ref 37–47)
HDLC SERPL-MCNC: 51 MG/DL (ref 35–60)
HEMOLYSIS INTERF INDEX SERPL-ACNC: 1
HGB BLD-MCNC: 11.5 G/DL (ref 12–16)
HGB UR QL STRIP: NEGATIVE
ICTERIC INTERF INDEX SERPL-ACNC: 0
KETONES UR QL STRIP: NEGATIVE
LDLC SERPL CALC-MCNC: 64 MG/DL (ref 50–140)
LEUKOCYTE ESTERASE UR QL STRIP: NORMAL
LIPEMIC INTERF INDEX SERPL-ACNC: 0
LYMPHOCYTES # BLD AUTO: 2.6 10*3/UL (ref 0.6–4.6)
LYMPHOCYTES NFR BLD AUTO: 27 %
MANUAL DIFF? (OHS): NO
MCH RBC QN AUTO: 28 PG (ref 27–31)
MCHC RBC AUTO-ENTMCNC: 31.5 G/DL (ref 33–36)
MCV RBC AUTO: 88.8 FL (ref 80–94)
MONOCYTES # BLD AUTO: 0.7 10*3/UL (ref 0.1–1.3)
MONOCYTES NFR BLD AUTO: 7 %
NEUTROPHILS # BLD AUTO: 5.49 10*3/UL (ref 2.1–9.2)
NEUTROPHILS NFR BLD AUTO: 57 %
NITRITE UR QL STRIP: NEGATIVE
PH UR STRIP: 5.5 [PH] (ref 5–9)
PLATELET # BLD AUTO: 523 10*3/UL (ref 130–400)
PMV BLD AUTO: 10 FL (ref 9.4–12.4)
POTASSIUM SERPL-SCNC: 5 MMOL/L (ref 3.5–5.1)
PROT SERPL-MCNC: 6.7 G/DL (ref 5.8–7.6)
PROT UR QL STRIP: NORMAL
RBC # BLD AUTO: 4.11 10*6/UL (ref 4.2–5.4)
RBC #/AREA URNS HPF: NORMAL /[HPF] (ref 0–2)
SMALL ROUND CELLS, UA: PRESENT
SODIUM SERPL-SCNC: 134 MMOL/L (ref 136–145)
SP GR UR STRIP: 1.01 (ref 1–1.03)
SPERM URNS QL MICRO: NORMAL
SQUAMOUS EPITHELIAL, UA: NORMAL (ref 0–4)
TRIGL SERPL-MCNC: 221 MG/DL (ref 37–140)
TSH SERPL-ACNC: 2.84 M[IU]/L (ref 0.35–4.94)
UROBILINOGEN UR STRIP-ACNC: 0.2
VLDLC SERPL CALC-MCNC: 44 MG/DL
WBC # SPEC AUTO: 9.7 10*3/UL (ref 4.5–11.5)
WBC #/AREA URNS HPF: 12 /[HPF] (ref 0–3)

## 2022-04-11 ENCOUNTER — HISTORICAL (OUTPATIENT)
Dept: ADMINISTRATIVE | Facility: HOSPITAL | Age: 82
End: 2022-04-11
Payer: MEDICARE

## 2022-04-26 ENCOUNTER — HISTORICAL (OUTPATIENT)
Dept: ADMINISTRATIVE | Facility: HOSPITAL | Age: 82
End: 2022-04-26
Payer: MEDICARE

## 2022-04-26 ENCOUNTER — HISTORICAL (OUTPATIENT)
Dept: RADIOLOGY | Facility: HOSPITAL | Age: 82
End: 2022-04-26
Payer: MEDICARE

## 2022-04-26 LAB — POC CREATININE: 1.4 (ref 0.6–1.3)

## 2022-04-28 VITALS
HEIGHT: 62 IN | OXYGEN SATURATION: 96 % | WEIGHT: 193.56 LBS | DIASTOLIC BLOOD PRESSURE: 80 MMHG | SYSTOLIC BLOOD PRESSURE: 161 MMHG | BODY MASS INDEX: 35.62 KG/M2

## 2022-05-23 RX ORDER — ZOLPIDEM TARTRATE 5 MG/1
5 TABLET ORAL NIGHTLY PRN
Qty: 30 TABLET | Refills: 5 | Status: SHIPPED | OUTPATIENT
Start: 2022-05-23 | End: 2022-01-01

## 2022-05-23 RX ORDER — ZOLPIDEM TARTRATE 5 MG/1
5 TABLET ORAL NIGHTLY PRN
COMMUNITY
Start: 2022-04-25 | End: 2022-05-23 | Stop reason: SDUPTHER

## 2022-05-24 ENCOUNTER — OFFICE VISIT (OUTPATIENT)
Dept: SURGICAL ONCOLOGY | Facility: CLINIC | Age: 82
End: 2022-05-24
Payer: MEDICARE

## 2022-05-24 VITALS
HEART RATE: 92 BPM | WEIGHT: 197 LBS | SYSTOLIC BLOOD PRESSURE: 145 MMHG | HEIGHT: 62 IN | BODY MASS INDEX: 36.25 KG/M2 | DIASTOLIC BLOOD PRESSURE: 79 MMHG

## 2022-05-24 DIAGNOSIS — K86.2 PANCREATIC CYST: Primary | ICD-10-CM

## 2022-05-24 PROCEDURE — 99214 OFFICE O/P EST MOD 30 MIN: CPT | Mod: S$PBB,,, | Performed by: SURGERY

## 2022-05-24 PROCEDURE — 99999 PR PBB SHADOW E&M-EST. PATIENT-LVL II: CPT | Mod: PBBFAC,,, | Performed by: SURGERY

## 2022-05-24 PROCEDURE — 99212 OFFICE O/P EST SF 10 MIN: CPT | Mod: PBBFAC | Performed by: SURGERY

## 2022-05-24 PROCEDURE — 99214 PR OFFICE/OUTPT VISIT, EST, LEVL IV, 30-39 MIN: ICD-10-PCS | Mod: S$PBB,,, | Performed by: SURGERY

## 2022-05-24 PROCEDURE — 99999 PR PBB SHADOW E&M-EST. PATIENT-LVL II: ICD-10-PCS | Mod: PBBFAC,,, | Performed by: SURGERY

## 2022-05-24 RX ORDER — CEFDINIR 300 MG/1
300 CAPSULE ORAL EVERY 12 HOURS
COMMUNITY
Start: 2022-02-23 | End: 2022-01-01

## 2022-05-24 RX ORDER — BLOOD-GLUCOSE METER
EACH MISCELLANEOUS
COMMUNITY
Start: 2022-03-24

## 2022-05-24 RX ORDER — LANCETS
EACH MISCELLANEOUS
COMMUNITY
Start: 2022-03-24

## 2022-05-24 RX ORDER — FAMOTIDINE 40 MG/1
40 TABLET, FILM COATED ORAL NIGHTLY
COMMUNITY
Start: 2022-02-07

## 2022-05-24 RX ORDER — PREDNISONE 20 MG/1
40 TABLET ORAL 2 TIMES DAILY
COMMUNITY
Start: 2022-02-15 | End: 2022-01-01

## 2022-05-24 RX ORDER — DIPHENOXYLATE HYDROCHLORIDE AND ATROPINE SULFATE 2.5; .025 MG/1; MG/1
1 TABLET ORAL EVERY 6 HOURS
Status: ON HOLD | COMMUNITY
Start: 2021-12-16 | End: 2023-01-01 | Stop reason: HOSPADM

## 2022-05-24 RX ORDER — FLUOXETINE HYDROCHLORIDE 40 MG/1
CAPSULE ORAL DAILY
COMMUNITY
Start: 2022-04-25 | End: 2022-01-01

## 2022-05-24 RX ORDER — ROPINIROLE 1 MG/1
1 TABLET, FILM COATED ORAL DAILY PRN
COMMUNITY
Start: 2022-04-25 | End: 2022-06-27 | Stop reason: SDUPTHER

## 2022-05-24 RX ORDER — TRIAMCINOLONE ACETONIDE 1 MG/G
CREAM TOPICAL NIGHTLY
COMMUNITY
Start: 2022-01-14

## 2022-05-24 RX ORDER — INSULIN DETEMIR 100 [IU]/ML
INJECTION, SOLUTION SUBCUTANEOUS
COMMUNITY
Start: 2022-05-16 | End: 2023-01-01 | Stop reason: SDUPTHER

## 2022-05-24 RX ORDER — AZELASTINE 1 MG/ML
2 SPRAY, METERED NASAL DAILY
COMMUNITY
Start: 2022-04-04

## 2022-05-24 RX ORDER — SUCRALFATE 1 G/1
1 TABLET ORAL 4 TIMES DAILY
COMMUNITY
Start: 2021-12-23

## 2022-05-24 RX ORDER — SIMVASTATIN 10 MG/1
TABLET, FILM COATED ORAL
COMMUNITY
Start: 2022-05-02 | End: 2022-07-25 | Stop reason: SDUPTHER

## 2022-05-24 RX ORDER — CODEINE PHOSPHATE AND GUAIFENESIN 10; 100 MG/5ML; MG/5ML
SOLUTION ORAL
COMMUNITY
Start: 2022-04-07 | End: 2022-01-01

## 2022-05-24 RX ORDER — OLMESARTAN MEDOXOMIL AND HYDROCHLOROTHIAZIDE 40/25 40; 25 MG/1; MG/1
1 TABLET ORAL EVERY MORNING
Status: ON HOLD | COMMUNITY
Start: 2022-04-25 | End: 2023-01-01 | Stop reason: HOSPADM

## 2022-05-24 RX ORDER — NIFEDIPINE 90 MG/1
90 TABLET, EXTENDED RELEASE ORAL DAILY
COMMUNITY
Start: 2022-05-23 | End: 2022-01-01

## 2022-05-24 RX ORDER — BLOOD SUGAR DIAGNOSTIC
STRIP MISCELLANEOUS
COMMUNITY
Start: 2022-04-04 | End: 2023-01-01 | Stop reason: SDUPTHER

## 2022-05-24 RX ORDER — FLUTICASONE PROPIONATE 50 MCG
2 SPRAY, SUSPENSION (ML) NASAL DAILY
COMMUNITY
Start: 2021-12-31

## 2022-05-24 RX ORDER — AZITHROMYCIN 250 MG/1
TABLET, FILM COATED ORAL
COMMUNITY
Start: 2022-04-07 | End: 2022-01-01

## 2022-05-24 RX ORDER — INSULIN ASPART 100 [IU]/ML
INJECTION, SOLUTION INTRAVENOUS; SUBCUTANEOUS
COMMUNITY
Start: 2022-03-31 | End: 2022-07-05 | Stop reason: SDUPTHER

## 2022-05-24 RX ORDER — ONDANSETRON 4 MG/1
4 TABLET, FILM COATED ORAL EVERY 6 HOURS PRN
COMMUNITY
Start: 2022-03-07 | End: 2023-01-01

## 2022-05-24 RX ORDER — PROMETHAZINE HYDROCHLORIDE 25 MG/1
25-50 TABLET ORAL EVERY 6 HOURS PRN
COMMUNITY
Start: 2022-05-03 | End: 2022-01-01

## 2022-05-24 RX ORDER — NYSTATIN 100000 U/G
CREAM TOPICAL 2 TIMES DAILY
COMMUNITY
Start: 2022-01-14

## 2022-05-24 RX ORDER — BENZONATATE 100 MG/1
100 CAPSULE ORAL EVERY 4 HOURS PRN
COMMUNITY
Start: 2022-02-15 | End: 2022-01-01

## 2022-05-24 RX ORDER — HYDROXYZINE HYDROCHLORIDE 25 MG/1
25 TABLET, FILM COATED ORAL DAILY PRN
COMMUNITY
Start: 2022-04-11 | End: 2022-07-18 | Stop reason: SDUPTHER

## 2022-05-24 RX ORDER — METOPROLOL SUCCINATE 50 MG/1
50 TABLET, EXTENDED RELEASE ORAL 2 TIMES DAILY
COMMUNITY
Start: 2022-05-02 | End: 2022-01-01

## 2022-05-24 RX ORDER — PANTOPRAZOLE SODIUM 40 MG/1
40 TABLET, DELAYED RELEASE ORAL DAILY
COMMUNITY
Start: 2022-05-23

## 2022-05-24 RX ORDER — ALPRAZOLAM 0.25 MG/1
0.25 TABLET ORAL DAILY PRN
COMMUNITY
Start: 2022-04-28 | End: 2022-01-01 | Stop reason: SDUPTHER

## 2022-05-24 NOTE — PROGRESS NOTES
Chief complaint:  Follow-up pancreatic cyst, ductal dilation.       HPI:  82-year-old female who presented with mild epigastric abdominal discomfort and no prior history of pancreatitis.  MRI revealed dilation of the majority of the main pancreatic duct up to 11 mm with pancreatic atrophy, no evidence of pancreatic mass.  She underwent EUS and this revealed diffuse dilation of the main pancreatic duct and side branches.  There was an area of pancreatic duct transition in the head of the gland, FNA was performed of this area and showed no evidence of malignant cells.    She has been undergoing surveillance and presents today without complaint.  She denies abdominal pain, diarrhea, weight loss or anorexia.  Surveillance MRI was performed, I personally reviewed and interpreted the images viewed were.  There are stable changes in the pancreas with main duct dilation to 10 mm, no associated mass lesions or other suspicious or worrisome findings.  I discussed these results with her in detail today and answered her questions.        Past Medical and Surgical History  Allergies :   Patient has no known allergies.    @[facility]@  Medical :   She has a past medical history of Altered mental status, Diabetes, Hyperlipidemia, Hypertension, and Restless leg syndrome.    Surgical :   She has a past surgical history that includes Gastrointestinal Biopsy; Total knee arthroplasty; Hemorrhoid surgery; Tubal ligation; Excision of Pancreas; and Excision of Stomach.     Family History  Her family history is not on file.    Social History  She      Review of Systems   Constitutional: Negative for appetite change, chills, diaphoresis and fever.   HENT: Negative for congestion, drooling, ear discharge, ear pain and hearing loss.    Eyes: Negative for discharge.   Respiratory: Negative for apnea, cough, choking, chest tightness, shortness of breath and stridor.    Cardiovascular: Negative for chest pain, palpitations and leg swelling.    Endocrine: Negative for cold intolerance and heat intolerance.   Genitourinary: Negative for difficulty urinating, dyspareunia, dysuria and hematuria.   Musculoskeletal: Negative for arthralgias, gait problem and joint swelling.   Skin: Negative for color change and rash.   Neurological: Negative for dizziness, tremors, seizures, syncope, facial asymmetry, speech difficulty, light-headedness, numbness and headaches.   Psychiatric/Behavioral: Negative for agitation and confusion.        Objective   Physical Exam  Constitutional:       General: She is not in acute distress.     Appearance: Normal appearance. She is normal weight. She is not toxic-appearing.   HENT:      Head: Normocephalic and atraumatic.      Right Ear: External ear normal.      Left Ear: External ear normal.      Nose: Nose normal.      Mouth/Throat:      Mouth: Mucous membranes are moist.      Pharynx: Oropharynx is clear.   Eyes:      General: No scleral icterus.     Conjunctiva/sclera: Conjunctivae normal.      Pupils: Pupils are equal, round, and reactive to light.   Cardiovascular:      Rate and Rhythm: Normal rate and regular rhythm.      Pulses: Normal pulses.      Heart sounds: Normal heart sounds. No murmur heard.    No gallop.   Pulmonary:      Effort: Pulmonary effort is normal.      Breath sounds: Normal breath sounds. No stridor. No wheezing or rhonchi.   Chest:      Chest wall: No tenderness.   Breasts:      Right: No swelling, bleeding, inverted nipple, mass, nipple discharge, skin change, tenderness, axillary adenopathy or supraclavicular adenopathy.      Left: No swelling, bleeding, inverted nipple, mass, nipple discharge, skin change, tenderness, axillary adenopathy or supraclavicular adenopathy.       Musculoskeletal:         General: No swelling, tenderness, deformity or signs of injury. Normal range of motion.      Cervical back: Normal range of motion and neck supple.      Right lower leg: No edema.      Left lower leg: No  "edema.   Lymphadenopathy:      Upper Body:      Right upper body: No supraclavicular or axillary adenopathy.      Left upper body: No supraclavicular or axillary adenopathy.   Skin:     Capillary Refill: Capillary refill takes less than 2 seconds.      Coloration: Skin is not jaundiced or pale.      Findings: No erythema.   Neurological:      General: No focal deficit present.      Mental Status: She is alert and oriented to person, place, and time.      Cranial Nerves: No cranial nerve deficit.      Motor: No weakness.      Gait: Gait normal.   Psychiatric:         Mood and Affect: Mood normal.         Behavior: Behavior normal.       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  The patient was previously presented at multidisciplinary GI tumor Board and we agreed that given her age and comorbidities close observation was most prudent.    Today she remains asymptomatic, MRI shows stable changes without worrisome or suspicious findings.    Return to clinic in 6 months with repeat surveillance MRI    "

## 2022-06-27 DIAGNOSIS — R25.2 LEG CRAMPS: Primary | ICD-10-CM

## 2022-06-27 RX ORDER — ROPINIROLE 1 MG/1
1 TABLET, FILM COATED ORAL DAILY PRN
Qty: 90 TABLET | Refills: 1 | Status: SHIPPED | OUTPATIENT
Start: 2022-06-27 | End: 2022-01-01

## 2022-07-05 DIAGNOSIS — Z79.4 TYPE 2 DIABETES MELLITUS WITHOUT COMPLICATION, WITH LONG-TERM CURRENT USE OF INSULIN: Primary | ICD-10-CM

## 2022-07-05 DIAGNOSIS — E11.9 TYPE 2 DIABETES MELLITUS WITHOUT COMPLICATION, WITH LONG-TERM CURRENT USE OF INSULIN: Primary | ICD-10-CM

## 2022-07-05 RX ORDER — INSULIN ASPART 100 [IU]/ML
8 INJECTION, SOLUTION INTRAVENOUS; SUBCUTANEOUS 3 TIMES DAILY
Qty: 3 EACH | Refills: 3 | Status: SHIPPED | OUTPATIENT
Start: 2022-07-05 | End: 2023-01-01

## 2022-07-12 DIAGNOSIS — I25.10 CORONARY ARTERY DISEASE, UNSPECIFIED VESSEL OR LESION TYPE, UNSPECIFIED WHETHER ANGINA PRESENT, UNSPECIFIED WHETHER NATIVE OR TRANSPLANTED HEART: Primary | ICD-10-CM

## 2022-07-18 DIAGNOSIS — L29.9 ITCHING: Primary | ICD-10-CM

## 2022-07-18 RX ORDER — HYDROXYZINE HYDROCHLORIDE 25 MG/1
25 TABLET, FILM COATED ORAL DAILY PRN
Qty: 30 TABLET | Refills: 5 | Status: ON HOLD | OUTPATIENT
Start: 2022-07-18 | End: 2023-01-01 | Stop reason: HOSPADM

## 2022-07-25 DIAGNOSIS — E78.5 HYPERLIPIDEMIA, UNSPECIFIED HYPERLIPIDEMIA TYPE: Primary | ICD-10-CM

## 2022-07-25 RX ORDER — SIMVASTATIN 10 MG/1
TABLET, FILM COATED ORAL
Qty: 90 TABLET | Refills: 1 | Status: SHIPPED | OUTPATIENT
Start: 2022-07-25 | End: 2023-01-01

## 2022-09-30 NOTE — TELEPHONE ENCOUNTER
Pt called requesting muscle relaxer for back pain, pt states she is not currently taking any muscle relaxer. If anything is sent she would like it sent to [facility] in [place]. Please Advise.   Call back Number: [phone]

## 2022-10-04 PROBLEM — N18.32 STAGE 3B CHRONIC KIDNEY DISEASE: Status: ACTIVE | Noted: 2022-01-01

## 2022-10-04 PROBLEM — N18.32 STAGE 3B CHRONIC KIDNEY DISEASE: Chronic | Status: ACTIVE | Noted: 2022-01-01

## 2022-10-04 PROBLEM — K21.9 GASTROESOPHAGEAL REFLUX DISEASE WITHOUT ESOPHAGITIS: Chronic | Status: ACTIVE | Noted: 2022-01-01

## 2022-10-04 NOTE — PROGRESS NOTES
Patient ID: Denise Oleary is a 82 y.o. female.    Chief Complaint: Follow-up    82-year-old female here today for 6 month follow-up visit for diabetes.  Medical comorbidities include  CKD 3, HTN, HLD,  GERD,anxiety/depression, insomnia, and RLS.   History also significant for pancreatic cyst  and ductal dilation with prior workup including MRI, EUS and FNA with no evidence of malignancy now currently only under surveillance. Since last visit patient reports has been fairly well, did have recent blood pressure medication adjustments per Cardiology.  Per patient recently initiated on Lexapro 10 mg as well perc by Cardiology.  Patient is currently on Prozac 40 mg, will increase Prozac to 60 mg and discontinue Lexapro 10 mg.  Does have Xanax p.r.n., which she was encouraged to utilize sparingly.  Most recent HGB A1c noted 7.7 worsen from previous  7.1, patient reports not compliant with diabetic diet.  Lengthy discussion had with patient regarding standards of diabetic care.  Will also initiate on Farxiga to aid both with diabetes and CKD.  Otherwise patient will receive high-dose influenza vaccine today.  No other acute medical concerns noted.       Wellness:  Next visit        MEDICAL HISTORY:    Past Medical History:   Diagnosis Date    Altered mental status     Anxiety and depression     Diabetes     Gastroesophageal reflux disease without esophagitis 10/4/2022    Hyperlipidemia     Hypertension     Primary insomnia     Restless leg syndrome     Stage 3b chronic kidney disease 10/4/2022      Past Surgical History:   Procedure Laterality Date    Excision of Pancreas      Excision of Stomach      Gastrointestinal Biopsy      HEMORRHOID SURGERY      TOTAL KNEE ARTHROPLASTY      TUBAL LIGATION        Social History     Tobacco Use    Smoking status: Never    Smokeless tobacco: Never   Substance Use Topics    Alcohol use: Not Currently          Health Maintenance Due   Topic Date Due    COVID-19 Vaccine (1)  "1940    Pneumococcal Vaccines (Age 65+) (1 - PCV) Never done    TETANUS VACCINE  Never done    Shingles Vaccine (1 of 2) Never done    Influenza Vaccine (1) 09/01/2022          Patient Care Team:  Yan Blanco MD as PCP - General (Internal Medicine)  MD Ketan Gutierrez MD as Consulting Physician (Surgical Oncology)  Junaid Warren MD as Consulting Physician (Gastroenterology)  Ketan Deng MD as Consulting Physician (Otolaryngology)  Wilson Gaffney MD as Consulting Physician (Cardiology)      Review of Systems   Constitutional:  Negative for fatigue and fever.   HENT:  Negative for congestion, rhinorrhea, sore throat and trouble swallowing.    Eyes:  Negative for redness and visual disturbance.   Respiratory:  Negative for cough, chest tightness and shortness of breath.    Cardiovascular:  Negative for chest pain and palpitations.   Gastrointestinal:  Negative for abdominal pain, constipation, diarrhea, nausea and vomiting.   Genitourinary:  Negative for dysuria, flank pain, frequency and urgency.   Musculoskeletal:  Negative for arthralgias, gait problem and myalgias.   Skin:  Negative for rash and wound.   Neurological:  Negative for facial asymmetry, speech difficulty, weakness and headaches.   All other systems reviewed and are negative.    Objective:   /84 (BP Location: Right arm, Patient Position: Sitting, BP Method: Large (Automatic))   Pulse 72   Temp 97.3 °F (36.3 °C)   Ht 5' 2" (1.575 m)   Wt 90.3 kg (199 lb)   SpO2 95%   BMI 36.40 kg/m²      Physical Exam  Constitutional:       General: She is not in acute distress.     Appearance: Normal appearance. She is obese.   HENT:      Right Ear: Tympanic membrane, ear canal and external ear normal.      Left Ear: Tympanic membrane, ear canal and external ear normal.      Nose: Nose normal.      Mouth/Throat:      Mouth: Mucous membranes are moist.      Pharynx: Oropharynx is clear.   Eyes:      Extraocular " Movements: Extraocular movements intact.      Conjunctiva/sclera: Conjunctivae normal.      Pupils: Pupils are equal, round, and reactive to light.   Cardiovascular:      Rate and Rhythm: Normal rate and regular rhythm.      Pulses: Normal pulses.      Heart sounds: Normal heart sounds. No murmur heard.    No gallop.   Pulmonary:      Effort: Pulmonary effort is normal.      Breath sounds: Normal breath sounds. No wheezing.   Abdominal:      General: Bowel sounds are normal. There is no distension.      Palpations: Abdomen is soft. There is no mass.      Tenderness: There is no abdominal tenderness. There is no guarding.   Musculoskeletal:         General: Normal range of motion.   Skin:     General: Skin is warm and dry.   Neurological:      Mental Status: She is alert. Mental status is at baseline.      Sensory: No sensory deficit.      Motor: No weakness.         Assessment:       ICD-10-CM ICD-9-CM   1. Type 2 diabetes mellitus without complication, with long-term current use of insulin  E11.9 250.00    Z79.4 V58.67   2. Stage 3b chronic kidney disease  N18.32 585.3   3. Primary hypertension  I10 401.9   4. Anxiety and depression  F41.9 300.00    F32.A 311   5. Primary insomnia  F51.01 307.42   6. Restless leg syndrome  G25.81 333.94   7. Mixed hyperlipidemia  E78.2 272.2   8. Gastroesophageal reflux disease without esophagitis  K21.9 530.81        Plan:     Problem List Items Addressed This Visit          Neuro    Restless leg syndrome (Chronic)      -currently on gabapentin 100 mg t.i.d. and Requip 1 mg daily, continue            Psychiatric    Anxiety and depression (Chronic)      - currently on Prozac 40 mg daily, increase to 60 mg daily  -recently initiated on 10 mg Lexapro in addition to her Prozac, discontinue Lexapro and increase Prozac as noted above   -Also with p.r.n. prescription for Xanax         Relevant Medications    FLUoxetine 60 mg Tab       Cardiac/Vascular    Hypertension (Chronic)      -Currently well controlled on olmesartan -HCTZ 40-25, metoprolol succinate 50 mg b.i.d., and amlodipine 5 mg daily  -low-sodium diet         Relevant Medications    amLODIPine (NORVASC) 5 MG tablet    hydrALAZINE (APRESOLINE) 25 MG tablet    Hyperlipidemia (Chronic)      -currently on simvastatin 10 mg daily, continue   -low-cholesterol diet            Renal/    Stage 3b chronic kidney disease (Chronic)      - most recent GFR 37  - currently on Arb  -initiate Farxiga today as well to help controlled diabetes         Relevant Medications    dapagliflozin (FARXIGA) 5 mg Tab tablet       Endocrine    Diabetes - Primary (Chronic)     Lab Results   Component Value Date    HGBA1C 7.7 (H) 09/27/2022    -uncontrolled with patient stating she does not watch diet   -Encourage strict diabetic diet with restriction carbohydrates (rice, pasta, bread, alcohol, candy )   -Currently on metformin 500 b.i.d., NovoLog 8 units t.i.d.  , Levemir  -Add Farxiga 5 mg daily for nephro protective qualities as well  -Currently on Arb and statin         Relevant Medications    gabapentin (NEURONTIN) 100 MG capsule    metFORMIN (GLUCOPHAGE) 500 MG tablet    dapagliflozin (FARXIGA) 5 mg Tab tablet       GI    Gastroesophageal reflux disease without esophagitis (Chronic)      -currently well controlled on Pepcid 40 mg nightly and Protonix 40 mg daily, continue   -avoid food triggers            Other    RESOLVED: Primary insomnia (Chronic)          Follow up in about 6 months (around 4/4/2023) for Wellness labs.   -plan specifics discussed above    Orders Placed This Encounter    FLUoxetine 60 mg Tab    dapagliflozin (FARXIGA) 5 mg Tab tablet        Medication List with Changes/Refills   New Medications    DAPAGLIFLOZIN (FARXIGA) 5 MG TAB TABLET    Take 1 tablet (5 mg total) by mouth once daily.    FLUOXETINE 60 MG TAB    Take 60 mg by mouth once daily.   Current Medications    ALPRAZOLAM (XANAX) 0.25 MG TABLET    Take 0.25 mg by mouth daily as  needed.    AMLODIPINE (NORVASC) 5 MG TABLET    Take 5 mg by mouth once daily.    AZELASTINE (ASTELIN) 137 MCG (0.1 %) NASAL SPRAY    2 sprays once daily.    CONTOUR NEXT EZ METER MISC    use as directed    CONTOUR NEXT TEST STRIPS STRP    CHECK BLOOD SUGAR TWICE DAILY    CYCLOBENZAPRINE (FLEXERIL) 10 MG TABLET    Take 1 tablet (10 mg total) by mouth 2 (two) times daily as needed for Muscle spasms.    DIPHENOXYLATE-ATROPINE 2.5-0.025 MG (LOMOTIL) 2.5-0.025 MG PER TABLET    Take 1 tablet by mouth every 6 (six) hours.    FAMOTIDINE (PEPCID) 40 MG TABLET    Take 40 mg by mouth nightly.    FLUTICASONE PROPIONATE (FLONASE) 50 MCG/ACTUATION NASAL SPRAY    2 sprays by Each Nostril route once daily.    GABAPENTIN (NEURONTIN) 100 MG CAPSULE    Take 100 mg by mouth 3 (three) times daily.    HYDRALAZINE (APRESOLINE) 25 MG TABLET    taek ONE TABLET BY MOUTH TWICE DAILY    HYDROXYZINE HCL (ATARAX) 25 MG TABLET    Take 1 tablet (25 mg total) by mouth daily as needed.    LEVEMIR FLEXTOUCH U-100 INSULN 100 UNIT/ML (3 ML) INPN PEN    SMARTSI Unit(s) SUB-Q Daily    METFORMIN (GLUCOPHAGE) 500 MG TABLET    TAKE TWO TABLETS BY MOUTH EVERY MORNING AND TWO TABLETS BY MOUTH AT BEDTIME    METOPROLOL SUCCINATE (TOPROL-XL) 50 MG 24 HR TABLET    Take 50 mg by mouth 2 (two) times daily.    MICROLET LANCET MIS    CHECK BLOOD SUGAR TWICE DAILY    NOVOLOG FLEXPEN U-100 INSULIN 100 UNIT/ML (3 ML) INPN PEN    Inject 8 Units into the skin 3 (three) times daily.    NYSTATIN (MYCOSTATIN) CREAM    Apply topically 2 (two) times daily.    OLMESARTAN-HYDROCHLOROTHIAZIDE (BENICAR HCT) 40-25 MG PER TABLET    Take 1 tablet by mouth every morning.    ONDANSETRON (ZOFRAN) 4 MG TABLET    Take 4 mg by mouth every 6 (six) hours as needed.    PANTOPRAZOLE (PROTONIX) 40 MG TABLET    Take 40 mg by mouth once daily.    ROPINIROLE (REQUIP) 1 MG TABLET    Take 1 tablet (1 mg total) by mouth daily as needed.    SIMVASTATIN (ZOCOR) 10 MG TABLET    SMARTSI Tablet(s)  By Mouth Every Evening    SUCRALFATE (CARAFATE) 1 GRAM TABLET    Take 1 g by mouth 4 (four) times daily.    TRIAMCINOLONE ACETONIDE 0.1% (KENALOG) 0.1 % CREAM    Apply topically nightly.   Discontinued Medications    AZITHROMYCIN (Z-KIERA) 250 MG TABLET    Take by mouth.    BENZONATATE (TESSALON) 100 MG CAPSULE    Take 100 mg by mouth every 4 (four) hours as needed.    CEFDINIR (OMNICEF) 300 MG CAPSULE    Take 300 mg by mouth every 12 (twelve) hours.    ESCITALOPRAM OXALATE (LEXAPRO) 10 MG TABLET    Take 10 mg by mouth once daily.    FLUOXETINE 40 MG CAPSULE    Take by mouth once daily.    GUAIFENESIN-CODEINE 100-10 MG/5 ML (TUSSI-ORGANIDIN NR)  MG/5 ML SYRUP    SMARTSI Teaspoon By Mouth Every 6 Hours PRN    MELOXICAM (MOBIC) 7.5 MG TABLET    Take 7.5 mg by mouth 2 (two) times daily.    NIFEDIPINE (PROCARDIA-XL) 90 MG (OSM) 24 HR TABLET    Take 90 mg by mouth once daily.    PREDNISONE (DELTASONE) 20 MG TABLET    Take 40 mg by mouth 2 (two) times daily.    PROMETHAZINE (PHENERGAN) 25 MG TABLET    Take 25-50 mg by mouth every 6 (six) hours as needed.    ZOLPIDEM (AMBIEN) 5 MG TAB    Take 1 tablet (5 mg total) by mouth nightly as needed.

## 2022-10-04 NOTE — ASSESSMENT & PLAN NOTE
- most recent GFR 37  - currently on Arb  -initiate Farxiga today as well to help controlled diabetes

## 2022-10-04 NOTE — ASSESSMENT & PLAN NOTE
Lab Results   Component Value Date    HGBA1C 7.7 (H) 09/27/2022    -uncontrolled with patient stating she does not watch diet   -Encourage strict diabetic diet with restriction carbohydrates (rice, pasta, bread, alcohol, candy )   -Currently on metformin 500 b.i.d., NovoLog 8 units t.i.d.  , Levemir  -Add Farxiga 5 mg daily for nephro protective qualities as well  -Currently on Arb and statin

## 2022-10-04 NOTE — ASSESSMENT & PLAN NOTE
-Currently well controlled on olmesartan -HCTZ 40-25, metoprolol succinate 50 mg b.i.d., and amlodipine 5 mg daily  -low-sodium diet

## 2022-10-04 NOTE — ASSESSMENT & PLAN NOTE
- currently on Prozac 40 mg daily, increase to 60 mg daily  -recently initiated on 10 mg Lexapro in addition to her Prozac, discontinue Lexapro and increase Prozac as noted above   -Also with p.r.n. prescription for Xanax

## 2022-10-04 NOTE — ASSESSMENT & PLAN NOTE
-currently well controlled on Pepcid 40 mg nightly and Protonix 40 mg daily, continue   -avoid food triggers

## 2022-10-11 NOTE — TELEPHONE ENCOUNTER
----- Message from OSMAR Young sent at 10/11/2022  2:27 PM CDT -----  Regarding: FW: sugar level  Please ask the patient to keep a log of her blood sugars, stop her p.m. metformin and decrease her Levemir to 65.  She is to call the office and notify us of her blood sugars  ----- Message -----  From: Phyllis Leon  Sent: 10/11/2022  10:21 AM CDT  To: Yan Blanco MD  Subject: sugar level                                      Patient said that since she started taking Farxinga 5mg   qd, Ain AM  Her sugar levels has been staying btw 41-49    She also takes Metforemin 500 mg 2x AM    & 2 PM                          Levemir      70mg  QD  AM                          Novolog 8 mg             tid    Doesn't know if she should take at a different time?

## 2022-10-11 NOTE — TELEPHONE ENCOUNTER
Pt advised per NP to stop metformin PM dose and reduce levemir to 60u nightly, continue all other medication as prescribed, stated understanding

## 2022-10-24 NOTE — TELEPHONE ENCOUNTER
Spoke to patient her main symptom is cough and hoarseness with sore throat she has no fever   I asked to do a home COVID test amount prescribed a [facility] for in [place] with the next 48 hours brings.

## 2022-10-24 NOTE — TELEPHONE ENCOUNTER
----- Message from Dalia Pino sent at 10/24/2022  1:05 PM CDT -----  Regarding: hoarse and sore throat  Patient wants to talk to you because she is has been hoarse since Saturday. Her throat hurts and she has yellow mucous. She did not do covid test she says its not covid. She uses [facility] [address] [facility] [place]. Call her at [phone]

## 2022-12-06 NOTE — PROGRESS NOTES
Chief complaint:  Follow-up pancreatic cyst, ductal dilation.       HPI:  82-year-old female who presented with mild epigastric abdominal discomfort and no prior history of pancreatitis.  MRI revealed dilation of the majority of the main pancreatic duct up to 11 mm with pancreatic atrophy, no evidence of pancreatic mass.  She underwent EUS and this revealed diffuse dilation of the main pancreatic duct and side branches.  There was an area of pancreatic duct transition in the head of the gland, FNA was performed of this area and showed no evidence of malignant cells.    She has been undergoing surveillance and presents today without complaint.  She denies abdominal pain, diarrhea, weight loss or anorexia.  Surveillance MRI was performed, I personally reviewed and interpreted the images viewed were.  There are stable changes in the pancreas with main duct dilation to 10 mm, no associated mass lesions or other suspicious or worrisome findings.  I discussed these results with her in detail today and answered her questions.    Greater than 30 minutes was required for complete chart review, imaging review, patient consultation, medical decision making, and documentation          Past Medical and Surgical History  Allergies :   Patient has no known allergies.    @[facility]@  Medical :   She has a past medical history of Altered mental status, Anxiety and depression, Diabetes, Gastroesophageal reflux disease without esophagitis (10/4/2022), Hyperlipidemia, Hypertension, Primary insomnia, Restless leg syndrome, and Stage 3b chronic kidney disease (10/4/2022).    Surgical :   She has a past surgical history that includes Gastrointestinal Biopsy; Total knee arthroplasty; Hemorrhoid surgery; Tubal ligation; Excision of Pancreas; and Excision of Stomach.     Family History  Her family history is not on file.    Social History  She reports that she has never smoked. She has never used smokeless tobacco. She reports that she does  not currently use alcohol.     Review of Systems   Constitutional:  Negative for appetite change, chills, diaphoresis and fever.   HENT:  Negative for congestion, drooling, ear discharge, ear pain and hearing loss.    Eyes:  Negative for discharge.   Respiratory:  Negative for apnea, cough, choking, chest tightness, shortness of breath and stridor.    Cardiovascular:  Negative for chest pain, palpitations and leg swelling.   Endocrine: Negative for cold intolerance and heat intolerance.   Genitourinary:  Negative for difficulty urinating, dyspareunia, dysuria and hematuria.   Musculoskeletal:  Negative for arthralgias, gait problem and joint swelling.   Skin:  Negative for color change and rash.   Neurological:  Negative for dizziness, tremors, seizures, syncope, facial asymmetry, speech difficulty, light-headedness, numbness and headaches.   Psychiatric/Behavioral:  Negative for agitation and confusion.       Objective   Physical Exam  Constitutional:       General: She is not in acute distress.     Appearance: Normal appearance. She is normal weight. She is not toxic-appearing.   HENT:      Head: Normocephalic and atraumatic.      Right Ear: External ear normal.      Left Ear: External ear normal.      Nose: Nose normal.      Mouth/Throat:      Mouth: Mucous membranes are moist.      Pharynx: Oropharynx is clear.   Eyes:      General: No scleral icterus.     Conjunctiva/sclera: Conjunctivae normal.      Pupils: Pupils are equal, round, and reactive to light.   Cardiovascular:      Rate and Rhythm: Normal rate and regular rhythm.      Pulses: Normal pulses.      Heart sounds: Normal heart sounds. No murmur heard.    No gallop.   Pulmonary:      Effort: Pulmonary effort is normal.      Breath sounds: Normal breath sounds. No stridor. No wheezing or rhonchi.   Chest:      Chest wall: No tenderness.   Breasts:     Right: No swelling, bleeding, inverted nipple, mass, nipple discharge, skin change or tenderness.       "Left: No swelling, bleeding, inverted nipple, mass, nipple discharge, skin change or tenderness.   Musculoskeletal:         General: No swelling, tenderness, deformity or signs of injury. Normal range of motion.      Cervical back: Normal range of motion and neck supple.      Right lower leg: No edema.      Left lower leg: No edema.   Lymphadenopathy:      Upper Body:      Right upper body: No supraclavicular or axillary adenopathy.      Left upper body: No supraclavicular or axillary adenopathy.   Skin:     Capillary Refill: Capillary refill takes less than 2 seconds.      Coloration: Skin is not jaundiced or pale.      Findings: No erythema.   Neurological:      General: No focal deficit present.      Mental Status: She is alert and oriented to person, place, and time.      Cranial Nerves: No cranial nerve deficit.      Motor: No weakness.      Gait: Gait normal.   Psychiatric:         Mood and Affect: Mood normal.         Behavior: Behavior normal.     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  The patient was previously presented at multidisciplinary GI tumor Board and we agreed that given her age and comorbidities close observation was most prudent.    Today she remains asymptomatic, MRI shows stable changes without worrisome or suspicious findings.    Return to clinic in 6 months with repeat surveillance MRI      "

## 2023-01-01 ENCOUNTER — TELEPHONE (OUTPATIENT)
Dept: INTERNAL MEDICINE | Facility: CLINIC | Age: 83
End: 2023-01-01
Payer: MEDICARE

## 2023-01-01 ENCOUNTER — LAB VISIT (OUTPATIENT)
Dept: LAB | Facility: HOSPITAL | Age: 83
End: 2023-01-01
Payer: MEDICARE

## 2023-01-01 ENCOUNTER — OFFICE VISIT (OUTPATIENT)
Dept: NEUROLOGY | Facility: CLINIC | Age: 83
End: 2023-01-01
Payer: MEDICARE

## 2023-01-01 ENCOUNTER — DOCUMENT SCAN (OUTPATIENT)
Dept: HOME HEALTH SERVICES | Facility: HOSPITAL | Age: 83
End: 2023-01-01
Payer: MEDICARE

## 2023-01-01 ENCOUNTER — OFFICE VISIT (OUTPATIENT)
Dept: INTERNAL MEDICINE | Facility: CLINIC | Age: 83
End: 2023-01-01
Payer: MEDICARE

## 2023-01-01 ENCOUNTER — EXTERNAL HOSPITAL ADMISSION (OUTPATIENT)
Dept: ADMINISTRATIVE | Facility: CLINIC | Age: 83
End: 2023-01-01
Payer: MEDICARE

## 2023-01-01 ENCOUNTER — DOCUMENTATION ONLY (OUTPATIENT)
Dept: ADMINISTRATIVE | Facility: HOSPITAL | Age: 83
End: 2023-01-01
Payer: MEDICARE

## 2023-01-01 ENCOUNTER — TELEPHONE (OUTPATIENT)
Dept: NEUROLOGY | Facility: CLINIC | Age: 83
End: 2023-01-01
Payer: MEDICARE

## 2023-01-01 ENCOUNTER — HOSPITAL ENCOUNTER (INPATIENT)
Facility: HOSPITAL | Age: 83
LOS: 5 days | Discharge: NURSING, REHAB OR LONG-TERM CARE | DRG: 682 | End: 2023-07-06
Attending: STUDENT IN AN ORGANIZED HEALTH CARE EDUCATION/TRAINING PROGRAM | Admitting: INTERNAL MEDICINE
Payer: MEDICARE

## 2023-01-01 ENCOUNTER — ANESTHESIA EVENT (OUTPATIENT)
Dept: ENDOSCOPY | Facility: HOSPITAL | Age: 83
DRG: 682 | End: 2023-01-01
Payer: MEDICARE

## 2023-01-01 ENCOUNTER — ANESTHESIA (OUTPATIENT)
Dept: ENDOSCOPY | Facility: HOSPITAL | Age: 83
DRG: 682 | End: 2023-01-01
Payer: MEDICARE

## 2023-01-01 ENCOUNTER — TELEPHONE (OUTPATIENT)
Dept: INTERNAL MEDICINE | Facility: CLINIC | Age: 83
End: 2023-01-01

## 2023-01-01 ENCOUNTER — PATIENT OUTREACH (OUTPATIENT)
Dept: ADMINISTRATIVE | Facility: CLINIC | Age: 83
End: 2023-01-01
Payer: MEDICARE

## 2023-01-01 VITALS
HEART RATE: 80 BPM | SYSTOLIC BLOOD PRESSURE: 122 MMHG | BODY MASS INDEX: 36.14 KG/M2 | DIASTOLIC BLOOD PRESSURE: 74 MMHG | HEIGHT: 62 IN | WEIGHT: 196.38 LBS | OXYGEN SATURATION: 96 %

## 2023-01-01 VITALS
HEIGHT: 62 IN | OXYGEN SATURATION: 96 % | TEMPERATURE: 98 F | DIASTOLIC BLOOD PRESSURE: 63 MMHG | HEART RATE: 83 BPM | RESPIRATION RATE: 18 BRPM | SYSTOLIC BLOOD PRESSURE: 131 MMHG | WEIGHT: 180 LBS | BODY MASS INDEX: 33.13 KG/M2

## 2023-01-01 VITALS
HEIGHT: 62 IN | SYSTOLIC BLOOD PRESSURE: 140 MMHG | WEIGHT: 180 LBS | DIASTOLIC BLOOD PRESSURE: 82 MMHG | BODY MASS INDEX: 33.13 KG/M2

## 2023-01-01 DIAGNOSIS — Z79.4 TYPE 2 DIABETES MELLITUS WITH STAGE 3B CHRONIC KIDNEY DISEASE, WITH LONG-TERM CURRENT USE OF INSULIN: ICD-10-CM

## 2023-01-01 DIAGNOSIS — M54.9 BACK PAIN, UNSPECIFIED BACK LOCATION, UNSPECIFIED BACK PAIN LATERALITY, UNSPECIFIED CHRONICITY: Primary | ICD-10-CM

## 2023-01-01 DIAGNOSIS — I63.9 CEREBROVASCULAR ACCIDENT (CVA), UNSPECIFIED MECHANISM: ICD-10-CM

## 2023-01-01 DIAGNOSIS — N18.32 TYPE 2 DIABETES MELLITUS WITH STAGE 3B CHRONIC KIDNEY DISEASE, WITH LONG-TERM CURRENT USE OF INSULIN: ICD-10-CM

## 2023-01-01 DIAGNOSIS — R07.9 CHEST PAIN: ICD-10-CM

## 2023-01-01 DIAGNOSIS — Z13.29 SCREENING FOR ENDOCRINE, NUTRITIONAL, METABOLIC AND IMMUNITY DISORDER: Primary | ICD-10-CM

## 2023-01-01 DIAGNOSIS — E11.22 TYPE 2 DIABETES MELLITUS WITH STAGE 3B CHRONIC KIDNEY DISEASE, WITH LONG-TERM CURRENT USE OF INSULIN: ICD-10-CM

## 2023-01-01 DIAGNOSIS — E78.5 HYPERLIPIDEMIA, UNSPECIFIED HYPERLIPIDEMIA TYPE: ICD-10-CM

## 2023-01-01 DIAGNOSIS — Z86.16 HISTORY OF COVID-19: ICD-10-CM

## 2023-01-01 DIAGNOSIS — Z79.4 TYPE 2 DIABETES MELLITUS WITH HYPERGLYCEMIA, WITH LONG-TERM CURRENT USE OF INSULIN: ICD-10-CM

## 2023-01-01 DIAGNOSIS — Z79.4 TYPE 2 DIABETES MELLITUS WITH STAGE 3B CHRONIC KIDNEY DISEASE, WITH LONG-TERM CURRENT USE OF INSULIN: Primary | Chronic | ICD-10-CM

## 2023-01-01 DIAGNOSIS — R11.10 VOMITING: ICD-10-CM

## 2023-01-01 DIAGNOSIS — F41.9 ANXIETY AND DEPRESSION: Chronic | ICD-10-CM

## 2023-01-01 DIAGNOSIS — I63.9 CEREBROVASCULAR ACCIDENT (CVA), UNSPECIFIED MECHANISM: Primary | ICD-10-CM

## 2023-01-01 DIAGNOSIS — E11.9 TYPE 2 DIABETES MELLITUS WITHOUT COMPLICATION, WITHOUT LONG-TERM CURRENT USE OF INSULIN: Primary | Chronic | ICD-10-CM

## 2023-01-01 DIAGNOSIS — E11.22 TYPE 2 DIABETES MELLITUS WITH STAGE 3B CHRONIC KIDNEY DISEASE, WITH LONG-TERM CURRENT USE OF INSULIN: Primary | Chronic | ICD-10-CM

## 2023-01-01 DIAGNOSIS — Z00.00 MEDICARE ANNUAL WELLNESS VISIT, SUBSEQUENT: Primary | ICD-10-CM

## 2023-01-01 DIAGNOSIS — Z13.89 SCREENING FOR CARDIOVASCULAR, RESPIRATORY, AND GENITOURINARY DISEASES: ICD-10-CM

## 2023-01-01 DIAGNOSIS — E66.01 CLASS 2 SEVERE OBESITY DUE TO EXCESS CALORIES WITH SERIOUS COMORBIDITY AND BODY MASS INDEX (BMI) OF 35.0 TO 35.9 IN ADULT: Chronic | ICD-10-CM

## 2023-01-01 DIAGNOSIS — I10 PRIMARY HYPERTENSION: Chronic | ICD-10-CM

## 2023-01-01 DIAGNOSIS — F32.A ANXIETY AND DEPRESSION: ICD-10-CM

## 2023-01-01 DIAGNOSIS — N17.9 AKI (ACUTE KIDNEY INJURY): Primary | ICD-10-CM

## 2023-01-01 DIAGNOSIS — N18.32 TYPE 2 DIABETES MELLITUS WITH STAGE 3B CHRONIC KIDNEY DISEASE, WITH LONG-TERM CURRENT USE OF INSULIN: Primary | Chronic | ICD-10-CM

## 2023-01-01 DIAGNOSIS — Z13.6 SCREENING FOR CARDIOVASCULAR, RESPIRATORY, AND GENITOURINARY DISEASES: ICD-10-CM

## 2023-01-01 DIAGNOSIS — E11.65 TYPE 2 DIABETES MELLITUS WITH HYPERGLYCEMIA, WITH LONG-TERM CURRENT USE OF INSULIN: ICD-10-CM

## 2023-01-01 DIAGNOSIS — E83.42 HYPOMAGNESEMIA: ICD-10-CM

## 2023-01-01 DIAGNOSIS — E13.649 UNCONTROLLED DIABETES MELLITUS OF OTHER TYPE WITH HYPOGLYCEMIA, UNSPECIFIED HYPOGLYCEMIA COMA STATUS: ICD-10-CM

## 2023-01-01 DIAGNOSIS — F41.9 ANXIETY AND DEPRESSION: Primary | Chronic | ICD-10-CM

## 2023-01-01 DIAGNOSIS — I47.10 SVT (SUPRAVENTRICULAR TACHYCARDIA): ICD-10-CM

## 2023-01-01 DIAGNOSIS — Z13.0 SCREENING FOR ENDOCRINE, NUTRITIONAL, METABOLIC AND IMMUNITY DISORDER: Primary | ICD-10-CM

## 2023-01-01 DIAGNOSIS — N18.32 STAGE 3B CHRONIC KIDNEY DISEASE: ICD-10-CM

## 2023-01-01 DIAGNOSIS — Z00.00 MEDICARE ANNUAL WELLNESS VISIT, SUBSEQUENT: ICD-10-CM

## 2023-01-01 DIAGNOSIS — K21.9 GASTROESOPHAGEAL REFLUX DISEASE WITHOUT ESOPHAGITIS: Chronic | ICD-10-CM

## 2023-01-01 DIAGNOSIS — I10 PRIMARY HYPERTENSION: ICD-10-CM

## 2023-01-01 DIAGNOSIS — Z13.21 SCREENING FOR ENDOCRINE, NUTRITIONAL, METABOLIC AND IMMUNITY DISORDER: Primary | ICD-10-CM

## 2023-01-01 DIAGNOSIS — F32.A ANXIETY AND DEPRESSION: Chronic | ICD-10-CM

## 2023-01-01 DIAGNOSIS — N18.32 STAGE 3B CHRONIC KIDNEY DISEASE: Chronic | ICD-10-CM

## 2023-01-01 DIAGNOSIS — E78.2 MIXED HYPERLIPIDEMIA: Chronic | ICD-10-CM

## 2023-01-01 DIAGNOSIS — Z13.228 SCREENING FOR ENDOCRINE, NUTRITIONAL, METABOLIC AND IMMUNITY DISORDER: Primary | ICD-10-CM

## 2023-01-01 DIAGNOSIS — E78.2 MIXED HYPERLIPIDEMIA: ICD-10-CM

## 2023-01-01 DIAGNOSIS — K21.9 GASTROESOPHAGEAL REFLUX DISEASE WITHOUT ESOPHAGITIS: Primary | Chronic | ICD-10-CM

## 2023-01-01 DIAGNOSIS — G25.81 RESTLESS LEG SYNDROME: Chronic | ICD-10-CM

## 2023-01-01 DIAGNOSIS — F32.A ANXIETY AND DEPRESSION: Primary | Chronic | ICD-10-CM

## 2023-01-01 DIAGNOSIS — R89.9 ABNORMAL LABORATORY TEST: ICD-10-CM

## 2023-01-01 DIAGNOSIS — R05.9 COUGH, UNSPECIFIED TYPE: Primary | ICD-10-CM

## 2023-01-01 DIAGNOSIS — Z13.83 SCREENING FOR CARDIOVASCULAR, RESPIRATORY, AND GENITOURINARY DISEASES: ICD-10-CM

## 2023-01-01 DIAGNOSIS — F41.9 ANXIETY AND DEPRESSION: ICD-10-CM

## 2023-01-01 DIAGNOSIS — R25.2 LEG CRAMPS: ICD-10-CM

## 2023-01-01 LAB
ALBUMIN SERPL-MCNC: 3.1 G/DL (ref 3.4–4.8)
ALBUMIN SERPL-MCNC: 3.1 G/DL (ref 3.4–4.8)
ALBUMIN SERPL-MCNC: 3.6 G/DL (ref 3.4–4.8)
ALBUMIN SERPL-MCNC: 3.6 G/DL (ref 3.4–4.8)
ALBUMIN/GLOB SERPL: 0.8 RATIO (ref 1.1–2)
ALBUMIN/GLOB SERPL: 1.1 RATIO (ref 1.1–2)
ALBUMIN/GLOB SERPL: 1.1 RATIO (ref 1.1–2)
ALBUMIN/GLOB SERPL: 1.2 RATIO (ref 1.1–2)
ALP SERPL-CCNC: 66 UNIT/L (ref 40–150)
ALP SERPL-CCNC: 68 UNIT/L (ref 40–150)
ALP SERPL-CCNC: 75 UNIT/L (ref 40–150)
ALP SERPL-CCNC: 80 UNIT/L (ref 40–150)
ALT SERPL-CCNC: 12 UNIT/L (ref 0–55)
ALT SERPL-CCNC: 13 UNIT/L (ref 0–55)
ALT SERPL-CCNC: 5 UNIT/L (ref 0–55)
ALT SERPL-CCNC: 6 UNIT/L (ref 0–55)
ANION GAP SERPL CALC-SCNC: 10 MEQ/L
ANION GAP SERPL CALC-SCNC: 13 MEQ/L
ANION GAP SERPL CALC-SCNC: 13 MEQ/L
ANION GAP SERPL CALC-SCNC: 5 MEQ/L
ANION GAP SERPL CALC-SCNC: 8 MEQ/L
APPEARANCE UR: CLEAR
APPEARANCE UR: CLEAR
AST SERPL-CCNC: 14 UNIT/L (ref 5–34)
AST SERPL-CCNC: 14 UNIT/L (ref 5–34)
AST SERPL-CCNC: 15 UNIT/L (ref 5–34)
AST SERPL-CCNC: 17 UNIT/L (ref 5–34)
B PERT.PT PRMT NPH QL NAA+NON-PROBE: NOT DETECTED
BACTERIA #/AREA URNS AUTO: ABNORMAL /HPF
BACTERIA #/AREA URNS AUTO: NORMAL /HPF
BACTERIA UR CULT: NO GROWTH
BASOPHILS # BLD AUTO: 0.1 X10(3)/MCL
BASOPHILS # BLD AUTO: 0.1 X10(3)/MCL
BASOPHILS # BLD AUTO: 0.11 X10(3)/MCL
BASOPHILS # BLD AUTO: 0.11 X10(3)/MCL (ref 0–0.2)
BASOPHILS # BLD AUTO: 0.12 X10(3)/MCL (ref 0–0.2)
BASOPHILS # BLD AUTO: 0.16 X10(3)/MCL
BASOPHILS # BLD AUTO: 0.19 X10(3)/MCL
BASOPHILS # BLD AUTO: 0.2 X10(3)/MCL
BASOPHILS NFR BLD AUTO: 0.9 %
BASOPHILS NFR BLD AUTO: 1 %
BASOPHILS NFR BLD AUTO: 1 %
BASOPHILS NFR BLD AUTO: 1.1 %
BASOPHILS NFR BLD AUTO: 1.2 %
BASOPHILS NFR BLD AUTO: 1.3 %
BASOPHILS NFR BLD AUTO: 1.3 %
BASOPHILS NFR BLD AUTO: 1.4 %
BILIRUB UR QL STRIP.AUTO: ABNORMAL MG/DL
BILIRUB UR QL STRIP.AUTO: NEGATIVE MG/DL
BILIRUBIN DIRECT+TOT PNL SERPL-MCNC: 0.3 MG/DL
BILIRUBIN DIRECT+TOT PNL SERPL-MCNC: 0.4 MG/DL
BILIRUBIN DIRECT+TOT PNL SERPL-MCNC: 0.4 MG/DL
BILIRUBIN DIRECT+TOT PNL SERPL-MCNC: 0.5 MG/DL
BUN SERPL-MCNC: 12.6 MG/DL (ref 9.8–20.1)
BUN SERPL-MCNC: 13 MG/DL (ref 9.8–20.1)
BUN SERPL-MCNC: 15.6 MG/DL (ref 9.8–20.1)
BUN SERPL-MCNC: 16 MG/DL (ref 9.8–20.1)
BUN SERPL-MCNC: 17 MG/DL (ref 9.8–20.1)
BUN SERPL-MCNC: 18.4 MG/DL (ref 9.8–20.1)
BUN SERPL-MCNC: 18.9 MG/DL (ref 9.8–20.1)
BUN SERPL-MCNC: 20.1 MG/DL (ref 9.8–20.1)
BUN SERPL-MCNC: 29.4 MG/DL (ref 9.8–20.1)
C PNEUM DNA NPH QL NAA+NON-PROBE: NOT DETECTED
CALCIUM SERPL-MCNC: 8.1 MG/DL (ref 8.4–10.2)
CALCIUM SERPL-MCNC: 8.3 MG/DL (ref 8.4–10.2)
CALCIUM SERPL-MCNC: 8.3 MG/DL (ref 8.4–10.2)
CALCIUM SERPL-MCNC: 8.4 MG/DL (ref 8.4–10.2)
CALCIUM SERPL-MCNC: 8.4 MG/DL (ref 8.4–10.2)
CALCIUM SERPL-MCNC: 8.9 MG/DL (ref 8.4–10.2)
CALCIUM SERPL-MCNC: 9.2 MG/DL (ref 8.4–10.2)
CHLORIDE SERPL-SCNC: 100 MMOL/L (ref 98–107)
CHLORIDE SERPL-SCNC: 101 MMOL/L (ref 98–107)
CHLORIDE SERPL-SCNC: 102 MMOL/L (ref 98–107)
CHLORIDE SERPL-SCNC: 103 MMOL/L (ref 98–107)
CHLORIDE SERPL-SCNC: 103 MMOL/L (ref 98–107)
CHLORIDE SERPL-SCNC: 104 MMOL/L (ref 98–107)
CHLORIDE SERPL-SCNC: 104 MMOL/L (ref 98–107)
CHLORIDE SERPL-SCNC: 107 MMOL/L (ref 98–107)
CHLORIDE SERPL-SCNC: 99 MMOL/L (ref 98–107)
CHOLEST SERPL-MCNC: 126 MG/DL
CHOLEST/HDLC SERPL: 3 {RATIO} (ref 0–5)
CO2 SERPL-SCNC: 22 MMOL/L (ref 23–31)
CO2 SERPL-SCNC: 23 MMOL/L (ref 23–31)
CO2 SERPL-SCNC: 23 MMOL/L (ref 23–31)
CO2 SERPL-SCNC: 25 MMOL/L (ref 23–31)
CO2 SERPL-SCNC: 26 MMOL/L (ref 23–31)
CO2 SERPL-SCNC: 26 MMOL/L (ref 23–31)
COLOR UR AUTO: YELLOW
COLOR UR: ABNORMAL
CREAT SERPL-MCNC: 1.23 MG/DL (ref 0.55–1.02)
CREAT SERPL-MCNC: 1.27 MG/DL (ref 0.55–1.02)
CREAT SERPL-MCNC: 1.29 MG/DL (ref 0.55–1.02)
CREAT SERPL-MCNC: 1.31 MG/DL (ref 0.55–1.02)
CREAT SERPL-MCNC: 1.33 MG/DL (ref 0.55–1.02)
CREAT SERPL-MCNC: 1.38 MG/DL (ref 0.55–1.02)
CREAT SERPL-MCNC: 1.56 MG/DL (ref 0.55–1.02)
CREAT SERPL-MCNC: 1.68 MG/DL (ref 0.55–1.02)
CREAT SERPL-MCNC: 1.82 MG/DL (ref 0.55–1.02)
CREAT UR-MCNC: 86.1 MG/DL (ref 47–110)
CREAT/UREA NIT SERPL: 10
CREAT/UREA NIT SERPL: 10
CREAT/UREA NIT SERPL: 11
CREAT/UREA NIT SERPL: 12
CREAT/UREA NIT SERPL: 13
EOSINOPHIL # BLD AUTO: 0.16 X10(3)/MCL (ref 0–0.9)
EOSINOPHIL # BLD AUTO: 0.21 X10(3)/MCL (ref 0–0.9)
EOSINOPHIL # BLD AUTO: 0.26 X10(3)/MCL (ref 0–0.9)
EOSINOPHIL # BLD AUTO: 0.28 X10(3)/MCL (ref 0–0.9)
EOSINOPHIL # BLD AUTO: 0.34 X10(3)/MCL (ref 0–0.9)
EOSINOPHIL # BLD AUTO: 0.35 X10(3)/MCL (ref 0–0.9)
EOSINOPHIL # BLD AUTO: 0.41 X10(3)/MCL (ref 0–0.9)
EOSINOPHIL # BLD AUTO: 0.41 X10(3)/MCL (ref 0–0.9)
EOSINOPHIL NFR BLD AUTO: 1.3 %
EOSINOPHIL NFR BLD AUTO: 1.6 %
EOSINOPHIL NFR BLD AUTO: 1.9 %
EOSINOPHIL NFR BLD AUTO: 2.1 %
EOSINOPHIL NFR BLD AUTO: 3.1 %
EOSINOPHIL NFR BLD AUTO: 3.6 %
EOSINOPHIL NFR BLD AUTO: 4 %
EOSINOPHIL NFR BLD AUTO: 4.1 %
ERYTHROCYTE [DISTWIDTH] IN BLOOD BY AUTOMATED COUNT: 13.2 % (ref 11.5–17)
ERYTHROCYTE [DISTWIDTH] IN BLOOD BY AUTOMATED COUNT: 13.3 % (ref 11.5–17)
ERYTHROCYTE [DISTWIDTH] IN BLOOD BY AUTOMATED COUNT: 14.8 % (ref 11.5–17)
ERYTHROCYTE [DISTWIDTH] IN BLOOD BY AUTOMATED COUNT: 14.9 % (ref 11.5–17)
ERYTHROCYTE [DISTWIDTH] IN BLOOD BY AUTOMATED COUNT: 15 % (ref 11.5–17)
ERYTHROCYTE [DISTWIDTH] IN BLOOD BY AUTOMATED COUNT: 15.1 % (ref 11.5–17)
EST. AVERAGE GLUCOSE BLD GHB EST-MCNC: 168.6 MG/DL
FERRITIN SERPL-MCNC: 149.54 NG/ML (ref 4.63–204)
FOLATE SERPL-MCNC: 10.5 NG/ML (ref 7–31.4)
GFR SERPLBLD CREATININE-BSD FMLA CKD-EPI: 28 MLS/MIN/1.73/M2
GFR SERPLBLD CREATININE-BSD FMLA CKD-EPI: 30 MLS/MIN/1.73/M2
GFR SERPLBLD CREATININE-BSD FMLA CKD-EPI: 33 MLS/MIN/1.73/M2
GFR SERPLBLD CREATININE-BSD FMLA CKD-EPI: 39 MLS/MIN/1.73/M2
GFR SERPLBLD CREATININE-BSD FMLA CKD-EPI: 40 MLS/MIN/1.73/M2
GFR SERPLBLD CREATININE-BSD FMLA CKD-EPI: 41 MLS/MIN/1.73/M2
GFR SERPLBLD CREATININE-BSD FMLA CKD-EPI: 42 MLS/MIN/1.73/M2
GFR SERPLBLD CREATININE-BSD FMLA CKD-EPI: 43 MLS/MIN/1.73/M2
GFR SERPLBLD CREATININE-BSD FMLA CKD-EPI: 44 MLS/MIN/1.73/M2
GLOBULIN SER-MCNC: 2.9 GM/DL (ref 2.4–3.5)
GLOBULIN SER-MCNC: 2.9 GM/DL (ref 2.4–3.5)
GLOBULIN SER-MCNC: 3.2 GM/DL (ref 2.4–3.5)
GLOBULIN SER-MCNC: 3.9 GM/DL (ref 2.4–3.5)
GLUCOSE SERPL-MCNC: 117 MG/DL (ref 82–115)
GLUCOSE SERPL-MCNC: 136 MG/DL (ref 82–115)
GLUCOSE SERPL-MCNC: 137 MG/DL (ref 82–115)
GLUCOSE SERPL-MCNC: 166 MG/DL (ref 82–115)
GLUCOSE SERPL-MCNC: 181 MG/DL (ref 82–115)
GLUCOSE SERPL-MCNC: 244 MG/DL (ref 82–115)
GLUCOSE SERPL-MCNC: 329 MG/DL (ref 82–115)
GLUCOSE SERPL-MCNC: 47 MG/DL (ref 82–115)
GLUCOSE SERPL-MCNC: 68 MG/DL (ref 82–115)
GLUCOSE UR QL STRIP.AUTO: NEGATIVE MG/DL
GLUCOSE UR QL STRIP.AUTO: NEGATIVE MG/DL
HADV DNA NPH QL NAA+NON-PROBE: NOT DETECTED
HBA1C MFR BLD: 7.5 %
HCOV 229E RNA NPH QL NAA+NON-PROBE: NOT DETECTED
HCOV HKU1 RNA NPH QL NAA+NON-PROBE: NOT DETECTED
HCOV NL63 RNA NPH QL NAA+NON-PROBE: NOT DETECTED
HCOV OC43 RNA NPH QL NAA+NON-PROBE: NOT DETECTED
HCT VFR BLD AUTO: 27.7 % (ref 37–47)
HCT VFR BLD AUTO: 28.8 % (ref 37–47)
HCT VFR BLD AUTO: 30.6 % (ref 37–47)
HCT VFR BLD AUTO: 31.3 % (ref 37–47)
HCT VFR BLD AUTO: 32.3 % (ref 37–47)
HCT VFR BLD AUTO: 33.2 % (ref 37–47)
HCT VFR BLD AUTO: 35.6 % (ref 37–47)
HCT VFR BLD AUTO: 38 % (ref 37–47)
HDLC SERPL-MCNC: 48 MG/DL (ref 35–60)
HGB BLD-MCNC: 10.1 G/DL (ref 12–16)
HGB BLD-MCNC: 10.6 G/DL (ref 12–16)
HGB BLD-MCNC: 11.1 G/DL (ref 12–16)
HGB BLD-MCNC: 11.7 G/DL (ref 12–16)
HGB BLD-MCNC: 8.9 G/DL (ref 12–16)
HGB BLD-MCNC: 9.1 G/DL (ref 12–16)
HGB BLD-MCNC: 9.5 G/DL (ref 12–16)
HGB BLD-MCNC: 9.7 G/DL (ref 12–16)
HMPV RNA NPH QL NAA+NON-PROBE: NOT DETECTED
HPIV1 RNA NPH QL NAA+NON-PROBE: NOT DETECTED
HPIV2 RNA NPH QL NAA+NON-PROBE: NOT DETECTED
HPIV3 RNA NPH QL NAA+NON-PROBE: NOT DETECTED
HPIV4 RNA NPH QL NAA+NON-PROBE: NOT DETECTED
IMM GRANULOCYTES # BLD AUTO: 0.02 X10(3)/MCL (ref 0–0.04)
IMM GRANULOCYTES # BLD AUTO: 0.03 X10(3)/MCL (ref 0–0.04)
IMM GRANULOCYTES # BLD AUTO: 0.04 X10(3)/MCL (ref 0–0.04)
IMM GRANULOCYTES # BLD AUTO: 0.04 X10(3)/MCL (ref 0–0.04)
IMM GRANULOCYTES # BLD AUTO: 0.06 X10(3)/MCL (ref 0–0.04)
IMM GRANULOCYTES # BLD AUTO: 0.06 X10(3)/MCL (ref 0–0.04)
IMM GRANULOCYTES # BLD AUTO: 0.09 X10(3)/MCL (ref 0–0.04)
IMM GRANULOCYTES # BLD AUTO: 0.09 X10(3)/MCL (ref 0–0.04)
IMM GRANULOCYTES NFR BLD AUTO: 0.2 %
IMM GRANULOCYTES NFR BLD AUTO: 0.3 %
IMM GRANULOCYTES NFR BLD AUTO: 0.4 %
IMM GRANULOCYTES NFR BLD AUTO: 0.4 %
IMM GRANULOCYTES NFR BLD AUTO: 0.5 %
IMM GRANULOCYTES NFR BLD AUTO: 0.5 %
IMM GRANULOCYTES NFR BLD AUTO: 0.6 %
IMM GRANULOCYTES NFR BLD AUTO: 0.6 %
IRON SATN MFR SERPL: 15 % (ref 20–50)
IRON SERPL-MCNC: 26 UG/DL (ref 50–170)
KETONES UR QL STRIP.AUTO: ABNORMAL MG/DL
KETONES UR QL STRIP.AUTO: NEGATIVE MG/DL
LDLC SERPL CALC-MCNC: 48 MG/DL (ref 50–140)
LEFT EYE DM RETINOPATHY: POSITIVE
LEUKOCYTE ESTERASE UR QL STRIP.AUTO: ABNORMAL UNIT/L
LEUKOCYTE ESTERASE UR QL STRIP.AUTO: NEGATIVE UNIT/L
LYMPHOCYTES # BLD AUTO: 2.47 X10(3)/MCL (ref 0.6–4.6)
LYMPHOCYTES # BLD AUTO: 2.65 X10(3)/MCL (ref 0.6–4.6)
LYMPHOCYTES # BLD AUTO: 2.71 X10(3)/MCL (ref 0.6–4.6)
LYMPHOCYTES # BLD AUTO: 2.97 X10(3)/MCL (ref 0.6–4.6)
LYMPHOCYTES # BLD AUTO: 2.98 X10(3)/MCL (ref 0.6–4.6)
LYMPHOCYTES # BLD AUTO: 3.16 X10(3)/MCL (ref 0.6–4.6)
LYMPHOCYTES # BLD AUTO: 3.2 X10(3)/MCL (ref 0.6–4.6)
LYMPHOCYTES # BLD AUTO: 3.2 X10(3)/MCL (ref 0.6–4.6)
LYMPHOCYTES NFR BLD AUTO: 19.9 %
LYMPHOCYTES NFR BLD AUTO: 19.9 %
LYMPHOCYTES NFR BLD AUTO: 20.1 %
LYMPHOCYTES NFR BLD AUTO: 25.1 %
LYMPHOCYTES NFR BLD AUTO: 27.7 %
LYMPHOCYTES NFR BLD AUTO: 29 %
LYMPHOCYTES NFR BLD AUTO: 29.3 %
LYMPHOCYTES NFR BLD AUTO: 31.7 %
M PNEUMO DNA NPH QL NAA+NON-PROBE: NOT DETECTED
MAGNESIUM SERPL-MCNC: 0.9 MG/DL (ref 1.6–2.6)
MAGNESIUM SERPL-MCNC: 1.3 MG/DL (ref 1.6–2.6)
MAGNESIUM SERPL-MCNC: 1.9 MG/DL (ref 1.6–2.6)
MAGNESIUM SERPL-MCNC: 2.8 MG/DL (ref 1.6–2.6)
MCH RBC QN AUTO: 26.7 PG (ref 27–31)
MCH RBC QN AUTO: 26.8 PG (ref 27–31)
MCH RBC QN AUTO: 27.1 PG (ref 27–31)
MCH RBC QN AUTO: 27.3 PG (ref 27–31)
MCH RBC QN AUTO: 27.3 PG (ref 27–31)
MCH RBC QN AUTO: 27.4 PG (ref 27–31)
MCH RBC QN AUTO: 27.8 PG (ref 27–31)
MCH RBC QN AUTO: 27.8 PG (ref 27–31)
MCHC RBC AUTO-ENTMCNC: 30.8 G/DL (ref 33–36)
MCHC RBC AUTO-ENTMCNC: 31 G/DL (ref 33–36)
MCHC RBC AUTO-ENTMCNC: 31 G/DL (ref 33–36)
MCHC RBC AUTO-ENTMCNC: 31.2 G/DL (ref 33–36)
MCHC RBC AUTO-ENTMCNC: 31.3 G/DL (ref 33–36)
MCHC RBC AUTO-ENTMCNC: 31.6 G/DL (ref 33–36)
MCHC RBC AUTO-ENTMCNC: 31.9 G/DL (ref 33–36)
MCHC RBC AUTO-ENTMCNC: 32.1 G/DL (ref 33–36)
MCV RBC AUTO: 84.7 FL (ref 80–94)
MCV RBC AUTO: 85 FL (ref 80–94)
MCV RBC AUTO: 85.8 FL (ref 80–94)
MCV RBC AUTO: 86.2 FL (ref 80–94)
MCV RBC AUTO: 87.3 FL (ref 80–94)
MCV RBC AUTO: 87.4 FL (ref 80–94)
MCV RBC AUTO: 89 FL (ref 80–94)
MCV RBC AUTO: 90.3 FL (ref 80–94)
MICROALBUMIN UR-MCNC: 128 UG/ML
MICROALBUMIN/CREAT RATIO PNL UR: 148.7 MG/GM CR (ref 0–30)
MONOCYTES # BLD AUTO: 0.75 X10(3)/MCL (ref 0.1–1.3)
MONOCYTES # BLD AUTO: 0.75 X10(3)/MCL (ref 0.1–1.3)
MONOCYTES # BLD AUTO: 0.82 X10(3)/MCL (ref 0.1–1.3)
MONOCYTES # BLD AUTO: 0.82 X10(3)/MCL (ref 0.1–1.3)
MONOCYTES # BLD AUTO: 0.87 X10(3)/MCL (ref 0.1–1.3)
MONOCYTES # BLD AUTO: 1.07 X10(3)/MCL (ref 0.1–1.3)
MONOCYTES # BLD AUTO: 1.25 X10(3)/MCL (ref 0.1–1.3)
MONOCYTES # BLD AUTO: 1.58 X10(3)/MCL (ref 0.1–1.3)
MONOCYTES NFR BLD AUTO: 6.4 %
MONOCYTES NFR BLD AUTO: 7.3 %
MONOCYTES NFR BLD AUTO: 8 %
MONOCYTES NFR BLD AUTO: 8.2 %
MONOCYTES NFR BLD AUTO: 8.4 %
MONOCYTES NFR BLD AUTO: 8.9 %
MONOCYTES NFR BLD AUTO: 8.9 %
MONOCYTES NFR BLD AUTO: 9.9 %
NEUTROPHILS # BLD AUTO: 10.19 X10(3)/MCL (ref 2.1–9.2)
NEUTROPHILS # BLD AUTO: 10.6 X10(3)/MCL (ref 2.1–9.2)
NEUTROPHILS # BLD AUTO: 4.73 X10(3)/MCL (ref 2.1–9.2)
NEUTROPHILS # BLD AUTO: 5.44 X10(3)/MCL (ref 2.1–9.2)
NEUTROPHILS # BLD AUTO: 5.72 X10(3)/MCL (ref 2.1–9.2)
NEUTROPHILS # BLD AUTO: 6.16 X10(3)/MCL (ref 2.1–9.2)
NEUTROPHILS # BLD AUTO: 8.4 X10(3)/MCL (ref 2.1–9.2)
NEUTROPHILS # BLD AUTO: 8.92 X10(3)/MCL (ref 2.1–9.2)
NEUTROPHILS NFR BLD AUTO: 54.6 %
NEUTROPHILS NFR BLD AUTO: 56.3 %
NEUTROPHILS NFR BLD AUTO: 58.4 %
NEUTROPHILS NFR BLD AUTO: 60.1 %
NEUTROPHILS NFR BLD AUTO: 65.8 %
NEUTROPHILS NFR BLD AUTO: 66.5 %
NEUTROPHILS NFR BLD AUTO: 67.1 %
NEUTROPHILS NFR BLD AUTO: 68.1 %
NITRITE UR QL STRIP.AUTO: NEGATIVE
NITRITE UR QL STRIP.AUTO: NEGATIVE
NRBC BLD AUTO-RTO: 0 %
PH UR STRIP.AUTO: 5 [PH]
PH UR STRIP.AUTO: 5 [PH]
PHOSPHATE SERPL-MCNC: 3.7 MG/DL (ref 2.3–4.7)
PHOSPHATE SERPL-MCNC: 4.4 MG/DL (ref 2.3–4.7)
PLATELET # BLD AUTO: 424 X10(3)/MCL (ref 130–400)
PLATELET # BLD AUTO: 452 X10(3)/MCL (ref 130–400)
PLATELET # BLD AUTO: 452 X10(3)/MCL (ref 130–400)
PLATELET # BLD AUTO: 466 X10(3)/MCL (ref 130–400)
PLATELET # BLD AUTO: 510 X10(3)/MCL (ref 130–400)
PLATELET # BLD AUTO: 553 X10(3)/MCL (ref 130–400)
PLATELET # BLD AUTO: 578 X10(3)/MCL (ref 130–400)
PLATELET # BLD AUTO: 589 X10(3)/MCL (ref 130–400)
PMV BLD AUTO: 10.1 FL (ref 7.4–10.4)
PMV BLD AUTO: 10.3 FL (ref 7.4–10.4)
PMV BLD AUTO: 10.6 FL (ref 7.4–10.4)
PMV BLD AUTO: 10.6 FL (ref 7.4–10.4)
PMV BLD AUTO: 10.7 FL (ref 7.4–10.4)
PMV BLD AUTO: 11 FL (ref 7.4–10.4)
PMV BLD AUTO: 9.8 FL (ref 7.4–10.4)
PMV BLD AUTO: 9.9 FL (ref 7.4–10.4)
POCT GLUCOSE: 141 MG/DL (ref 70–110)
POCT GLUCOSE: 145 MG/DL (ref 70–110)
POCT GLUCOSE: 152 MG/DL (ref 70–110)
POCT GLUCOSE: 156 MG/DL (ref 70–110)
POCT GLUCOSE: 159 MG/DL (ref 70–110)
POCT GLUCOSE: 160 MG/DL (ref 70–110)
POCT GLUCOSE: 160 MG/DL (ref 70–110)
POCT GLUCOSE: 162 MG/DL (ref 70–110)
POCT GLUCOSE: 162 MG/DL (ref 70–110)
POCT GLUCOSE: 164 MG/DL (ref 70–110)
POCT GLUCOSE: 171 MG/DL (ref 70–110)
POCT GLUCOSE: 209 MG/DL (ref 70–110)
POCT GLUCOSE: 210 MG/DL (ref 70–110)
POCT GLUCOSE: 226 MG/DL (ref 70–110)
POCT GLUCOSE: 229 MG/DL (ref 70–110)
POCT GLUCOSE: 232 MG/DL (ref 70–110)
POCT GLUCOSE: 242 MG/DL (ref 70–110)
POCT GLUCOSE: 267 MG/DL (ref 70–110)
POCT GLUCOSE: 273 MG/DL (ref 70–110)
POCT GLUCOSE: 277 MG/DL (ref 70–110)
POCT GLUCOSE: 290 MG/DL (ref 70–110)
POCT GLUCOSE: 297 MG/DL (ref 70–110)
POCT GLUCOSE: 393 MG/DL (ref 70–110)
POCT GLUCOSE: 472 MG/DL (ref 70–110)
POCT GLUCOSE: 62 MG/DL (ref 70–110)
POCT GLUCOSE: 95 MG/DL (ref 70–110)
POTASSIUM SERPL-SCNC: 3.4 MMOL/L (ref 3.5–5.1)
POTASSIUM SERPL-SCNC: 3.6 MMOL/L (ref 3.5–5.1)
POTASSIUM SERPL-SCNC: 3.7 MMOL/L (ref 3.5–5.1)
POTASSIUM SERPL-SCNC: 4.1 MMOL/L (ref 3.5–5.1)
POTASSIUM SERPL-SCNC: 4.1 MMOL/L (ref 3.5–5.1)
POTASSIUM SERPL-SCNC: 4.2 MMOL/L (ref 3.5–5.1)
POTASSIUM SERPL-SCNC: 4.6 MMOL/L (ref 3.5–5.1)
POTASSIUM SERPL-SCNC: 5.1 MMOL/L (ref 3.5–5.1)
POTASSIUM SERPL-SCNC: 5.4 MMOL/L (ref 3.5–5.1)
PROT SERPL-MCNC: 6 GM/DL (ref 5.8–7.6)
PROT SERPL-MCNC: 6.5 GM/DL (ref 5.8–7.6)
PROT SERPL-MCNC: 6.8 GM/DL (ref 5.8–7.6)
PROT SERPL-MCNC: 7 GM/DL (ref 5.8–7.6)
PROT UR QL STRIP.AUTO: ABNORMAL MG/DL
PROT UR QL STRIP.AUTO: ABNORMAL MG/DL
PSYCHE PATHOLOGY RESULT: NORMAL
RBC # BLD AUTO: 3.26 X10(6)/MCL (ref 4.2–5.4)
RBC # BLD AUTO: 3.4 X10(6)/MCL (ref 4.2–5.4)
RBC # BLD AUTO: 3.5 X10(6)/MCL (ref 4.2–5.4)
RBC # BLD AUTO: 3.63 X10(6)/MCL (ref 4.2–5.4)
RBC # BLD AUTO: 3.7 X10(6)/MCL (ref 4.2–5.4)
RBC # BLD AUTO: 3.87 X10(6)/MCL (ref 4.2–5.4)
RBC # BLD AUTO: 4 X10(6)/MCL (ref 4.2–5.4)
RBC # BLD AUTO: 4.21 X10(6)/MCL (ref 4.2–5.4)
RBC #/AREA URNS AUTO: <5 /HPF
RBC #/AREA URNS AUTO: <5 /HPF
RBC UR QL AUTO: NEGATIVE UNIT/L
RBC UR QL AUTO: NEGATIVE UNIT/L
RIGHT EYE DM RETINOPATHY: POSITIVE
RSV RNA NPH QL NAA+NON-PROBE: NOT DETECTED
RV+EV RNA NPH QL NAA+NON-PROBE: NOT DETECTED
SARS-COV-2 RDRP RESP QL NAA+PROBE: NEGATIVE
SODIUM SERPL-SCNC: 134 MMOL/L (ref 136–145)
SODIUM SERPL-SCNC: 135 MMOL/L (ref 136–145)
SODIUM SERPL-SCNC: 135 MMOL/L (ref 136–145)
SODIUM SERPL-SCNC: 136 MMOL/L (ref 136–145)
SODIUM SERPL-SCNC: 137 MMOL/L (ref 136–145)
SODIUM SERPL-SCNC: 137 MMOL/L (ref 136–145)
SODIUM SERPL-SCNC: 140 MMOL/L (ref 136–145)
SP GR UR STRIP.AUTO: 1.02 (ref 1–1.03)
SP GR UR STRIP.AUTO: 1.03 (ref 1–1.03)
SQUAMOUS #/AREA URNS AUTO: <5 /HPF
SQUAMOUS #/AREA URNS AUTO: <5 /HPF
TIBC SERPL-MCNC: 150 UG/DL (ref 70–310)
TIBC SERPL-MCNC: 176 UG/DL (ref 250–450)
TRANSFERRIN SERPL-MCNC: 155 MG/DL
TRIGL SERPL-MCNC: 148 MG/DL (ref 37–140)
TSH SERPL-ACNC: 2.05 UIU/ML (ref 0.35–4.94)
UROBILINOGEN UR STRIP-ACNC: 0.2 MG/DL
UROBILINOGEN UR STRIP-ACNC: 1 MG/DL
VIT B12 SERPL-MCNC: <148 PG/ML (ref 213–816)
VLDLC SERPL CALC-MCNC: 30 MG/DL
WBC # SPEC AUTO: 10.2 X10(3)/MCL (ref 4.5–11.5)
WBC # SPEC AUTO: 12.8 X10(3)/MCL (ref 4.5–11.5)
WBC # SPEC AUTO: 13.3 X10(3)/MCL (ref 4.5–11.5)
WBC # SPEC AUTO: 14.95 X10(3)/MCL (ref 4.5–11.5)
WBC # SPEC AUTO: 15.93 X10(3)/MCL (ref 4.5–11.5)
WBC # SPEC AUTO: 8.42 X10(3)/MCL (ref 4.5–11.5)
WBC # SPEC AUTO: 9.79 X10(3)/MCL (ref 4.5–11.5)
WBC # SPEC AUTO: 9.97 X10(3)/MCL (ref 4.5–11.5)
WBC #/AREA URNS AUTO: 18 /HPF
WBC #/AREA URNS AUTO: <5 /HPF

## 2023-01-01 PROCEDURE — 85025 COMPLETE CBC W/AUTO DIFF WBC: CPT

## 2023-01-01 PROCEDURE — 25000003 PHARM REV CODE 250: Performed by: INTERNAL MEDICINE

## 2023-01-01 PROCEDURE — 85025 COMPLETE CBC W/AUTO DIFF WBC: CPT | Performed by: INTERNAL MEDICINE

## 2023-01-01 PROCEDURE — 99214 OFFICE O/P EST MOD 30 MIN: CPT | Mod: PBBFAC | Performed by: PSYCHIATRY & NEUROLOGY

## 2023-01-01 PROCEDURE — 80048 BASIC METABOLIC PNL TOTAL CA: CPT | Performed by: INTERNAL MEDICINE

## 2023-01-01 PROCEDURE — 83735 ASSAY OF MAGNESIUM: CPT | Performed by: INTERNAL MEDICINE

## 2023-01-01 PROCEDURE — 63600175 PHARM REV CODE 636 W HCPCS: Performed by: NURSE PRACTITIONER

## 2023-01-01 PROCEDURE — 63600175 PHARM REV CODE 636 W HCPCS: Performed by: NURSE ANESTHETIST, CERTIFIED REGISTERED

## 2023-01-01 PROCEDURE — 82962 GLUCOSE BLOOD TEST: CPT

## 2023-01-01 PROCEDURE — 87635 SARS-COV-2 COVID-19 AMP PRB: CPT | Performed by: INTERNAL MEDICINE

## 2023-01-01 PROCEDURE — 80053 COMPREHEN METABOLIC PANEL: CPT

## 2023-01-01 PROCEDURE — 63600175 PHARM REV CODE 636 W HCPCS: Performed by: INTERNAL MEDICINE

## 2023-01-01 PROCEDURE — 25000003 PHARM REV CODE 250: Performed by: NURSE PRACTITIONER

## 2023-01-01 PROCEDURE — 63600175 PHARM REV CODE 636 W HCPCS: Performed by: PHYSICIAN ASSISTANT

## 2023-01-01 PROCEDURE — 81001 URINALYSIS AUTO W/SCOPE: CPT

## 2023-01-01 PROCEDURE — 80053 COMPREHEN METABOLIC PANEL: CPT | Performed by: PHYSICIAN ASSISTANT

## 2023-01-01 PROCEDURE — 25000003 PHARM REV CODE 250: Performed by: EMERGENCY MEDICINE

## 2023-01-01 PROCEDURE — S0179 MEGESTROL 20 MG: HCPCS | Performed by: INTERNAL MEDICINE

## 2023-01-01 PROCEDURE — G0439 PR MEDICARE ANNUAL WELLNESS SUBSEQUENT VISIT: ICD-10-PCS | Mod: ,,,

## 2023-01-01 PROCEDURE — 87798 DETECT AGENT NOS DNA AMP: CPT | Performed by: INTERNAL MEDICINE

## 2023-01-01 PROCEDURE — 25000003 PHARM REV CODE 250: Performed by: PHYSICIAN ASSISTANT

## 2023-01-01 PROCEDURE — 11000001 HC ACUTE MED/SURG PRIVATE ROOM

## 2023-01-01 PROCEDURE — 80053 COMPREHEN METABOLIC PANEL: CPT | Performed by: NURSE PRACTITIONER

## 2023-01-01 PROCEDURE — 97535 SELF CARE MNGMENT TRAINING: CPT

## 2023-01-01 PROCEDURE — 82746 ASSAY OF FOLIC ACID SERUM: CPT | Performed by: INTERNAL MEDICINE

## 2023-01-01 PROCEDURE — 82607 VITAMIN B-12: CPT | Performed by: INTERNAL MEDICINE

## 2023-01-01 PROCEDURE — 37000009 HC ANESTHESIA EA ADD 15 MINS: Performed by: INTERNAL MEDICINE

## 2023-01-01 PROCEDURE — 88305 TISSUE EXAM BY PATHOLOGIST: CPT | Performed by: INTERNAL MEDICINE

## 2023-01-01 PROCEDURE — G0439 PPPS, SUBSEQ VISIT: HCPCS | Mod: ,,,

## 2023-01-01 PROCEDURE — 99999 PR PBB SHADOW E&M-EST. PATIENT-LVL IV: CPT | Mod: PBBFAC,,, | Performed by: PSYCHIATRY & NEUROLOGY

## 2023-01-01 PROCEDURE — 82728 ASSAY OF FERRITIN: CPT | Performed by: INTERNAL MEDICINE

## 2023-01-01 PROCEDURE — 36415 COLL VENOUS BLD VENIPUNCTURE: CPT

## 2023-01-01 PROCEDURE — 81001 URINALYSIS AUTO W/SCOPE: CPT | Performed by: PHYSICIAN ASSISTANT

## 2023-01-01 PROCEDURE — 88312 SPECIAL STAINS GROUP 1: CPT

## 2023-01-01 PROCEDURE — 83550 IRON BINDING TEST: CPT | Performed by: INTERNAL MEDICINE

## 2023-01-01 PROCEDURE — 84100 ASSAY OF PHOSPHORUS: CPT | Performed by: INTERNAL MEDICINE

## 2023-01-01 PROCEDURE — 87088 URINE BACTERIA CULTURE: CPT

## 2023-01-01 PROCEDURE — 93010 EKG 12-LEAD: ICD-10-PCS | Mod: ,,, | Performed by: INTERNAL MEDICINE

## 2023-01-01 PROCEDURE — 93005 ELECTROCARDIOGRAM TRACING: CPT

## 2023-01-01 PROCEDURE — 99204 OFFICE O/P NEW MOD 45 MIN: CPT | Mod: S$PBB,,, | Performed by: PSYCHIATRY & NEUROLOGY

## 2023-01-01 PROCEDURE — A9698 NON-RAD CONTRAST MATERIALNOC: HCPCS | Performed by: NURSE PRACTITIONER

## 2023-01-01 PROCEDURE — 80061 LIPID PANEL: CPT

## 2023-01-01 PROCEDURE — 25000003 PHARM REV CODE 250: Performed by: NURSE ANESTHETIST, CERTIFIED REGISTERED

## 2023-01-01 PROCEDURE — 85025 COMPLETE CBC W/AUTO DIFF WBC: CPT | Performed by: NURSE PRACTITIONER

## 2023-01-01 PROCEDURE — 92610 EVALUATE SWALLOWING FUNCTION: CPT

## 2023-01-01 PROCEDURE — 93010 ELECTROCARDIOGRAM REPORT: CPT | Mod: ,,, | Performed by: INTERNAL MEDICINE

## 2023-01-01 PROCEDURE — 83036 HEMOGLOBIN GLYCOSYLATED A1C: CPT

## 2023-01-01 PROCEDURE — 99285 EMERGENCY DEPT VISIT HI MDM: CPT | Mod: 25

## 2023-01-01 PROCEDURE — 43239 EGD BIOPSY SINGLE/MULTIPLE: CPT | Performed by: INTERNAL MEDICINE

## 2023-01-01 PROCEDURE — 27201423 OPTIME MED/SURG SUP & DEVICES STERILE SUPPLY: Performed by: INTERNAL MEDICINE

## 2023-01-01 PROCEDURE — D9220A PRA ANESTHESIA: ICD-10-PCS | Mod: ANES,,, | Performed by: ANESTHESIOLOGY

## 2023-01-01 PROCEDURE — 82043 UR ALBUMIN QUANTITATIVE: CPT

## 2023-01-01 PROCEDURE — 85025 COMPLETE CBC W/AUTO DIFF WBC: CPT | Performed by: PHYSICIAN ASSISTANT

## 2023-01-01 PROCEDURE — D9220A PRA ANESTHESIA: ICD-10-PCS | Mod: CRNA,,, | Performed by: NURSE ANESTHETIST, CERTIFIED REGISTERED

## 2023-01-01 PROCEDURE — 97166 OT EVAL MOD COMPLEX 45 MIN: CPT

## 2023-01-01 PROCEDURE — 99204 PR OFFICE/OUTPT VISIT, NEW, LEVL IV, 45-59 MIN: ICD-10-PCS | Mod: S$PBB,,, | Performed by: PSYCHIATRY & NEUROLOGY

## 2023-01-01 PROCEDURE — C9113 INJ PANTOPRAZOLE SODIUM, VIA: HCPCS | Performed by: INTERNAL MEDICINE

## 2023-01-01 PROCEDURE — 88313 SPECIAL STAINS GROUP 2: CPT

## 2023-01-01 PROCEDURE — 37000008 HC ANESTHESIA 1ST 15 MINUTES: Performed by: INTERNAL MEDICINE

## 2023-01-01 PROCEDURE — 87633 RESP VIRUS 12-25 TARGETS: CPT | Performed by: INTERNAL MEDICINE

## 2023-01-01 PROCEDURE — 96361 HYDRATE IV INFUSION ADD-ON: CPT

## 2023-01-01 PROCEDURE — 84443 ASSAY THYROID STIM HORMONE: CPT

## 2023-01-01 PROCEDURE — D9220A PRA ANESTHESIA: Mod: ANES,,, | Performed by: ANESTHESIOLOGY

## 2023-01-01 PROCEDURE — D9220A PRA ANESTHESIA: Mod: CRNA,,, | Performed by: NURSE ANESTHETIST, CERTIFIED REGISTERED

## 2023-01-01 PROCEDURE — 96372 THER/PROPH/DIAG INJ SC/IM: CPT | Performed by: PHYSICIAN ASSISTANT

## 2023-01-01 PROCEDURE — 99999 PR PBB SHADOW E&M-EST. PATIENT-LVL IV: ICD-10-PCS | Mod: PBBFAC,,, | Performed by: PSYCHIATRY & NEUROLOGY

## 2023-01-01 PROCEDURE — 25500020 PHARM REV CODE 255: Performed by: NURSE PRACTITIONER

## 2023-01-01 PROCEDURE — 92611 MOTION FLUOROSCOPY/SWALLOW: CPT

## 2023-01-01 PROCEDURE — 96360 HYDRATION IV INFUSION INIT: CPT

## 2023-01-01 PROCEDURE — 97162 PT EVAL MOD COMPLEX 30 MIN: CPT

## 2023-01-01 RX ORDER — AMLODIPINE BESYLATE 5 MG/1
10 TABLET ORAL DAILY
Status: DISCONTINUED | OUTPATIENT
Start: 2023-01-01 | End: 2023-01-01 | Stop reason: HOSPADM

## 2023-01-01 RX ORDER — GABAPENTIN 100 MG/1
100 CAPSULE ORAL NIGHTLY
Qty: 30 CAPSULE | Refills: 0 | Status: SHIPPED | OUTPATIENT
Start: 2023-01-01 | End: 2023-01-01

## 2023-01-01 RX ORDER — PROMETHAZINE HYDROCHLORIDE 25 MG/1
25-50 TABLET ORAL EVERY 6 HOURS PRN
Qty: 30 TABLET | Refills: 3 | Status: ON HOLD | OUTPATIENT
Start: 2023-01-01 | End: 2023-01-01 | Stop reason: HOSPADM

## 2023-01-01 RX ORDER — ESCITALOPRAM OXALATE 10 MG/1
10 TABLET ORAL
COMMUNITY
Start: 2023-01-01 | End: 2023-01-01

## 2023-01-01 RX ORDER — GABAPENTIN 100 MG/1
100 CAPSULE ORAL NIGHTLY
Status: DISCONTINUED | OUTPATIENT
Start: 2023-01-01 | End: 2023-01-01 | Stop reason: HOSPADM

## 2023-01-01 RX ORDER — LANOLIN ALCOHOL/MO/W.PET/CERES
100 CREAM (GRAM) TOPICAL DAILY
Qty: 30 TABLET | Refills: 0 | OUTPATIENT
Start: 2023-01-01 | End: 2023-01-01 | Stop reason: SDUPTHER

## 2023-01-01 RX ORDER — HYDRALAZINE HYDROCHLORIDE 50 MG/1
50 TABLET, FILM COATED ORAL EVERY 8 HOURS
Qty: 90 TABLET | Refills: 0 | OUTPATIENT
Start: 2023-01-01 | End: 2023-01-01 | Stop reason: SDUPTHER

## 2023-01-01 RX ORDER — ATORVASTATIN CALCIUM 40 MG/1
40 TABLET, FILM COATED ORAL NIGHTLY
Qty: 30 TABLET | Refills: 0 | Status: SHIPPED | OUTPATIENT
Start: 2023-01-01 | End: 2023-01-01

## 2023-01-01 RX ORDER — ATORVASTATIN CALCIUM 40 MG/1
40 TABLET, FILM COATED ORAL NIGHTLY
Qty: 30 TABLET | Refills: 0 | OUTPATIENT
Start: 2023-01-01 | End: 2023-01-01 | Stop reason: SDUPTHER

## 2023-01-01 RX ORDER — QUETIAPINE FUMARATE 50 MG/1
50 TABLET, FILM COATED ORAL
Qty: 30 TABLET | Refills: 0 | Status: SHIPPED | OUTPATIENT
Start: 2023-01-01 | End: 2023-01-01 | Stop reason: SDUPTHER

## 2023-01-01 RX ORDER — ASPIRIN 81 MG
TABLET, DELAYED RELEASE (ENTERIC COATED) ORAL
COMMUNITY
Start: 2023-01-01

## 2023-01-01 RX ORDER — ROPINIROLE 1 MG/1
1 TABLET, FILM COATED ORAL DAILY PRN
Qty: 90 TABLET | Refills: 1 | Status: SHIPPED | OUTPATIENT
Start: 2023-01-01

## 2023-01-01 RX ORDER — DOCUSATE SODIUM 100 MG/1
200 CAPSULE, LIQUID FILLED ORAL 2 TIMES DAILY
Status: DISCONTINUED | OUTPATIENT
Start: 2023-01-01 | End: 2023-01-01 | Stop reason: HOSPADM

## 2023-01-01 RX ORDER — FLUOXETINE HYDROCHLORIDE 20 MG/1
60 CAPSULE ORAL
COMMUNITY
Start: 2023-01-01

## 2023-01-01 RX ORDER — MEGESTROL ACETATE 40 MG/ML
400 SUSPENSION ORAL DAILY
Qty: 300 ML | Refills: 0 | OUTPATIENT
Start: 2023-01-01 | End: 2023-01-01 | Stop reason: SDUPTHER

## 2023-01-01 RX ORDER — OLMESARTAN MEDOXOMIL 40 MG/1
40 TABLET ORAL DAILY
Qty: 30 TABLET | Refills: 0 | Status: SHIPPED | OUTPATIENT
Start: 2023-01-01 | End: 2023-01-01

## 2023-01-01 RX ORDER — MELOXICAM 7.5 MG/1
7.5 TABLET ORAL DAILY
Qty: 14 TABLET | Refills: 0 | Status: ON HOLD | OUTPATIENT
Start: 2023-01-01 | End: 2023-01-01 | Stop reason: HOSPADM

## 2023-01-01 RX ORDER — ZOLPIDEM TARTRATE 5 MG/1
5 TABLET ORAL NIGHTLY PRN
Qty: 30 TABLET | Refills: 5 | Status: ON HOLD | OUTPATIENT
Start: 2023-01-01 | End: 2023-01-01 | Stop reason: HOSPADM

## 2023-01-01 RX ORDER — THIAMINE HCL 100 MG
100 TABLET ORAL 2 TIMES DAILY
Status: DISCONTINUED | OUTPATIENT
Start: 2023-01-01 | End: 2023-01-01 | Stop reason: HOSPADM

## 2023-01-01 RX ORDER — LANOLIN ALCOHOL/MO/W.PET/CERES
400 CREAM (GRAM) TOPICAL 2 TIMES DAILY
Status: DISCONTINUED | OUTPATIENT
Start: 2023-01-01 | End: 2023-01-01

## 2023-01-01 RX ORDER — LANOLIN ALCOHOL/MO/W.PET/CERES
400 CREAM (GRAM) TOPICAL DAILY
Qty: 30 TABLET | Refills: 0 | Status: SHIPPED | OUTPATIENT
Start: 2023-01-01 | End: 2023-01-01

## 2023-01-01 RX ORDER — GABAPENTIN 300 MG/1
300 CAPSULE ORAL 3 TIMES DAILY
Qty: 90 CAPSULE | Refills: 11 | Status: SHIPPED | OUTPATIENT
Start: 2023-01-01 | End: 2024-07-30

## 2023-01-01 RX ORDER — OLMESARTAN MEDOXOMIL 40 MG/1
40 TABLET ORAL DAILY
Qty: 30 TABLET | Refills: 0 | OUTPATIENT
Start: 2023-01-01 | End: 2023-01-01 | Stop reason: SDUPTHER

## 2023-01-01 RX ORDER — CLOPIDOGREL BISULFATE 75 MG/1
75 TABLET ORAL DAILY
COMMUNITY

## 2023-01-01 RX ORDER — AMLODIPINE BESYLATE 10 MG/1
10 TABLET ORAL DAILY
Qty: 30 TABLET | Refills: 0 | Status: SHIPPED | OUTPATIENT
Start: 2023-01-01 | End: 2023-01-01

## 2023-01-01 RX ORDER — HYDRALAZINE HYDROCHLORIDE 50 MG/1
50 TABLET, FILM COATED ORAL EVERY 8 HOURS
Qty: 90 TABLET | Refills: 0 | Status: SHIPPED | OUTPATIENT
Start: 2023-01-01 | End: 2023-01-01 | Stop reason: SDUPTHER

## 2023-01-01 RX ORDER — INSULIN ASPART 100 [IU]/ML
0-5 INJECTION, SOLUTION INTRAVENOUS; SUBCUTANEOUS
Status: DISCONTINUED | OUTPATIENT
Start: 2023-01-01 | End: 2023-01-01 | Stop reason: HOSPADM

## 2023-01-01 RX ORDER — TALC
6 POWDER (GRAM) TOPICAL NIGHTLY PRN
Status: DISCONTINUED | OUTPATIENT
Start: 2023-01-01 | End: 2023-01-01 | Stop reason: HOSPADM

## 2023-01-01 RX ORDER — CLONIDINE 0.2 MG/24H
1 PATCH, EXTENDED RELEASE TRANSDERMAL
Status: DISCONTINUED | OUTPATIENT
Start: 2023-01-01 | End: 2023-01-01 | Stop reason: HOSPADM

## 2023-01-01 RX ORDER — ATORVASTATIN CALCIUM 80 MG/1
80 TABLET, FILM COATED ORAL NIGHTLY
Status: ON HOLD | COMMUNITY
End: 2023-01-01 | Stop reason: HOSPADM

## 2023-01-01 RX ORDER — GABAPENTIN 300 MG/1
300 CAPSULE ORAL NIGHTLY
Status: DISCONTINUED | OUTPATIENT
Start: 2023-01-01 | End: 2023-01-01

## 2023-01-01 RX ORDER — CLOPIDOGREL BISULFATE 75 MG/1
75 TABLET ORAL DAILY
Status: DISCONTINUED | OUTPATIENT
Start: 2023-01-01 | End: 2023-01-01 | Stop reason: HOSPADM

## 2023-01-01 RX ORDER — BACLOFEN 10 MG/1
5 TABLET ORAL 3 TIMES DAILY
Qty: 45 TABLET | Refills: 11 | Status: SHIPPED | OUTPATIENT
Start: 2023-01-01 | End: 2024-07-30

## 2023-01-01 RX ORDER — ZOLPIDEM TARTRATE 5 MG/1
5 TABLET ORAL NIGHTLY
Status: DISCONTINUED | OUTPATIENT
Start: 2023-01-01 | End: 2023-01-01

## 2023-01-01 RX ORDER — MEGESTROL ACETATE 40 MG/ML
400 SUSPENSION ORAL DAILY
Status: DISCONTINUED | OUTPATIENT
Start: 2023-01-01 | End: 2023-01-01 | Stop reason: HOSPADM

## 2023-01-01 RX ORDER — HYDRALAZINE HYDROCHLORIDE 20 MG/ML
10 INJECTION INTRAMUSCULAR; INTRAVENOUS EVERY 8 HOURS PRN
Status: DISCONTINUED | OUTPATIENT
Start: 2023-01-01 | End: 2023-01-01

## 2023-01-01 RX ORDER — MEGESTROL ACETATE 40 MG/ML
400 SUSPENSION ORAL DAILY
Qty: 300 ML | Refills: 0 | Status: SHIPPED | OUTPATIENT
Start: 2023-01-01 | End: 2023-01-01 | Stop reason: SDUPTHER

## 2023-01-01 RX ORDER — HYDROCODONE BITARTRATE AND ACETAMINOPHEN 5; 325 MG/1; MG/1
1 TABLET ORAL EVERY 8 HOURS PRN
Qty: 15 TABLET | Refills: 0 | Status: SHIPPED | OUTPATIENT
Start: 2023-01-01 | End: 2023-01-01 | Stop reason: SDUPTHER

## 2023-01-01 RX ORDER — SODIUM CHLORIDE, SODIUM LACTATE, POTASSIUM CHLORIDE, CALCIUM CHLORIDE 600; 310; 30; 20 MG/100ML; MG/100ML; MG/100ML; MG/100ML
INJECTION, SOLUTION INTRAVENOUS CONTINUOUS
Status: ACTIVE | OUTPATIENT
Start: 2023-01-01 | End: 2023-01-01

## 2023-01-01 RX ORDER — QUETIAPINE FUMARATE 50 MG/1
50 TABLET, FILM COATED ORAL
Qty: 30 TABLET | Refills: 0 | OUTPATIENT
Start: 2023-01-01 | End: 2023-01-01 | Stop reason: SDUPTHER

## 2023-01-01 RX ORDER — DIPHENHYDRAMINE HCL 25 MG
25 CAPSULE ORAL EVERY 6 HOURS PRN
Status: DISCONTINUED | OUTPATIENT
Start: 2023-01-01 | End: 2023-01-01 | Stop reason: HOSPADM

## 2023-01-01 RX ORDER — NALOXONE HCL 0.4 MG/ML
0.02 VIAL (ML) INJECTION
Status: DISCONTINUED | OUTPATIENT
Start: 2023-01-01 | End: 2023-01-01 | Stop reason: HOSPADM

## 2023-01-01 RX ORDER — DOXYCYCLINE 100 MG/1
TABLET ORAL
Status: ON HOLD | COMMUNITY
Start: 2023-01-01 | End: 2023-01-01 | Stop reason: HOSPADM

## 2023-01-01 RX ORDER — INSULIN ASPART 100 [IU]/ML
10 INJECTION, SOLUTION INTRAVENOUS; SUBCUTANEOUS
Qty: 3 EACH | Refills: 3 | Status: ON HOLD | OUTPATIENT
Start: 2023-01-01 | End: 2023-01-01 | Stop reason: SDUPTHER

## 2023-01-01 RX ORDER — ONDANSETRON 2 MG/ML
4 INJECTION INTRAMUSCULAR; INTRAVENOUS ONCE AS NEEDED
Status: CANCELLED | OUTPATIENT
Start: 2023-01-01 | End: 2034-11-29

## 2023-01-01 RX ORDER — AMLODIPINE BESYLATE 10 MG/1
10 TABLET ORAL DAILY
Qty: 30 TABLET | Refills: 0 | OUTPATIENT
Start: 2023-01-01 | End: 2023-01-01 | Stop reason: SDUPTHER

## 2023-01-01 RX ORDER — SODIUM CHLORIDE 0.9 % (FLUSH) 0.9 %
10 SYRINGE (ML) INJECTION
Status: DISCONTINUED | OUTPATIENT
Start: 2023-01-01 | End: 2023-01-01 | Stop reason: HOSPADM

## 2023-01-01 RX ORDER — GABAPENTIN 300 MG/1
300 CAPSULE ORAL DAILY
Qty: 30 CAPSULE | Refills: 3 | Status: ON HOLD | OUTPATIENT
Start: 2023-01-01 | End: 2023-01-01 | Stop reason: HOSPADM

## 2023-01-01 RX ORDER — INSULIN DETEMIR 100 [IU]/ML
INJECTION, SOLUTION SUBCUTANEOUS
Qty: 15 ML | Refills: 3 | Status: SHIPPED | OUTPATIENT
Start: 2023-01-01

## 2023-01-01 RX ORDER — SODIUM CHLORIDE 9 MG/ML
INJECTION, SOLUTION INTRAVENOUS CONTINUOUS
Status: DISCONTINUED | OUTPATIENT
Start: 2023-01-01 | End: 2023-01-01

## 2023-01-01 RX ORDER — MAGNESIUM SULFATE HEPTAHYDRATE 40 MG/ML
4 INJECTION, SOLUTION INTRAVENOUS ONCE
Status: COMPLETED | OUTPATIENT
Start: 2023-01-01 | End: 2023-01-01

## 2023-01-01 RX ORDER — ONDANSETRON 2 MG/ML
4 INJECTION INTRAMUSCULAR; INTRAVENOUS EVERY 4 HOURS PRN
Status: DISCONTINUED | OUTPATIENT
Start: 2023-01-01 | End: 2023-01-01 | Stop reason: HOSPADM

## 2023-01-01 RX ORDER — DOCUSATE SODIUM 100 MG/1
100 CAPSULE, LIQUID FILLED ORAL 2 TIMES DAILY
Qty: 60 CAPSULE | Refills: 0 | Status: SHIPPED | OUTPATIENT
Start: 2023-01-01 | End: 2023-01-01 | Stop reason: SDUPTHER

## 2023-01-01 RX ORDER — CYANOCOBALAMIN 1000 UG/ML
1000 INJECTION, SOLUTION INTRAMUSCULAR; SUBCUTANEOUS DAILY
Status: COMPLETED | OUTPATIENT
Start: 2023-01-01 | End: 2023-01-01

## 2023-01-01 RX ORDER — HYDRALAZINE HYDROCHLORIDE 50 MG/1
50 TABLET, FILM COATED ORAL EVERY 8 HOURS
Status: DISCONTINUED | OUTPATIENT
Start: 2023-01-01 | End: 2023-01-01 | Stop reason: HOSPADM

## 2023-01-01 RX ORDER — QUETIAPINE FUMARATE 50 MG/1
50 TABLET, FILM COATED ORAL
Qty: 30 TABLET | Refills: 0 | Status: SHIPPED | OUTPATIENT
Start: 2023-01-01 | End: 2023-01-01

## 2023-01-01 RX ORDER — ENOXAPARIN SODIUM 100 MG/ML
40 INJECTION SUBCUTANEOUS EVERY 24 HOURS
Status: DISCONTINUED | OUTPATIENT
Start: 2023-01-01 | End: 2023-01-01

## 2023-01-01 RX ORDER — ALPRAZOLAM 0.25 MG/1
0.25 TABLET ORAL
Status: DISCONTINUED | OUTPATIENT
Start: 2023-01-01 | End: 2023-01-01

## 2023-01-01 RX ORDER — FERROUS SULFATE, DRIED 160(50) MG
1 TABLET, EXTENDED RELEASE ORAL
COMMUNITY

## 2023-01-01 RX ORDER — SIMVASTATIN 10 MG/1
TABLET, FILM COATED ORAL
Qty: 90 TABLET | Refills: 1 | Status: SHIPPED | OUTPATIENT
Start: 2023-01-01 | End: 2023-01-01

## 2023-01-01 RX ORDER — INSULIN ASPART 100 [IU]/ML
10 INJECTION, SOLUTION INTRAVENOUS; SUBCUTANEOUS
Qty: 3 EACH | Refills: 3
Start: 2023-01-01

## 2023-01-01 RX ORDER — QUETIAPINE FUMARATE 50 MG/1
50 TABLET, FILM COATED ORAL 2 TIMES DAILY
Qty: 60 TABLET | Refills: 11 | Status: SHIPPED | OUTPATIENT
Start: 2023-01-01 | End: 2024-07-30

## 2023-01-01 RX ORDER — ATORVASTATIN CALCIUM 40 MG/1
40 TABLET, FILM COATED ORAL NIGHTLY
Status: DISCONTINUED | OUTPATIENT
Start: 2023-01-01 | End: 2023-01-01 | Stop reason: HOSPADM

## 2023-01-01 RX ORDER — BLOOD-GLUCOSE,RECEIVER,CONT
1 EACH MISCELLANEOUS
Qty: 1 EACH | Refills: 11 | Status: SHIPPED | OUTPATIENT
Start: 2023-01-01 | End: 2024-04-11

## 2023-01-01 RX ORDER — INSULIN ASPART 100 [IU]/ML
INJECTION, SUSPENSION SUBCUTANEOUS
Status: ON HOLD | COMMUNITY
End: 2023-01-01 | Stop reason: HOSPADM

## 2023-01-01 RX ORDER — DOCUSATE SODIUM 100 MG/1
100 CAPSULE, LIQUID FILLED ORAL 2 TIMES DAILY
Qty: 60 CAPSULE | Refills: 0 | Status: SHIPPED | OUTPATIENT
Start: 2023-01-01 | End: 2023-01-01

## 2023-01-01 RX ORDER — HYDRALAZINE HYDROCHLORIDE 50 MG/1
50 TABLET, FILM COATED ORAL EVERY 8 HOURS
Qty: 90 TABLET | Refills: 0 | Status: SHIPPED | OUTPATIENT
Start: 2023-01-01 | End: 2023-01-01

## 2023-01-01 RX ORDER — SODIUM CHLORIDE 9 MG/ML
INJECTION, SOLUTION INTRAVENOUS CONTINUOUS PRN
Status: DISCONTINUED | OUTPATIENT
Start: 2023-01-01 | End: 2023-01-01

## 2023-01-01 RX ORDER — ENOXAPARIN SODIUM 100 MG/ML
40 INJECTION SUBCUTANEOUS EVERY 24 HOURS
Status: DISCONTINUED | OUTPATIENT
Start: 2023-01-01 | End: 2023-01-01 | Stop reason: HOSPADM

## 2023-01-01 RX ORDER — LANOLIN ALCOHOL/MO/W.PET/CERES
400 CREAM (GRAM) TOPICAL DAILY
Qty: 30 TABLET | Refills: 0 | OUTPATIENT
Start: 2023-01-01 | End: 2023-01-01 | Stop reason: SDUPTHER

## 2023-01-01 RX ORDER — METOPROLOL SUCCINATE 50 MG/1
50 TABLET, EXTENDED RELEASE ORAL DAILY
Status: DISCONTINUED | OUTPATIENT
Start: 2023-01-01 | End: 2023-01-01 | Stop reason: HOSPADM

## 2023-01-01 RX ORDER — BENZONATATE 200 MG/1
200 CAPSULE ORAL 3 TIMES DAILY PRN
COMMUNITY

## 2023-01-01 RX ORDER — ALPRAZOLAM 0.25 MG/1
0.25 TABLET ORAL DAILY PRN
Qty: 5 TABLET | Refills: 0 | Status: SHIPPED | OUTPATIENT
Start: 2023-01-01 | End: 2023-01-01

## 2023-01-01 RX ORDER — LANOLIN ALCOHOL/MO/W.PET/CERES
100 CREAM (GRAM) TOPICAL DAILY
Qty: 30 TABLET | Refills: 0 | Status: SHIPPED | OUTPATIENT
Start: 2023-01-01 | End: 2023-01-01 | Stop reason: SDUPTHER

## 2023-01-01 RX ORDER — BENZONATATE 200 MG/1
200 CAPSULE ORAL 3 TIMES DAILY PRN
Qty: 30 CAPSULE | Refills: 6 | Status: SHIPPED | OUTPATIENT
Start: 2023-01-01 | End: 2023-01-01

## 2023-01-01 RX ORDER — BLOOD SUGAR DIAGNOSTIC
1 STRIP MISCELLANEOUS 4 TIMES DAILY
Qty: 200 STRIP | Refills: 6 | Status: SHIPPED | OUTPATIENT
Start: 2023-01-01

## 2023-01-01 RX ORDER — FLASH GLUCOSE SENSOR
1 KIT MISCELLANEOUS
Qty: 4 KIT | Refills: 11 | Status: SHIPPED | OUTPATIENT
Start: 2023-01-01

## 2023-01-01 RX ORDER — GABAPENTIN 100 MG/1
100 CAPSULE ORAL NIGHTLY
Qty: 30 CAPSULE | Refills: 0 | Status: SHIPPED | OUTPATIENT
Start: 2023-01-01 | End: 2023-01-01 | Stop reason: SDUPTHER

## 2023-01-01 RX ORDER — HYDRALAZINE HYDROCHLORIDE 20 MG/ML
20 INJECTION INTRAMUSCULAR; INTRAVENOUS EVERY 8 HOURS PRN
Status: DISCONTINUED | OUTPATIENT
Start: 2023-01-01 | End: 2023-01-01 | Stop reason: HOSPADM

## 2023-01-01 RX ORDER — QUETIAPINE FUMARATE 25 MG/1
25 TABLET, FILM COATED ORAL
Status: DISCONTINUED | OUTPATIENT
Start: 2023-01-01 | End: 2023-01-01

## 2023-01-01 RX ORDER — ATORVASTATIN CALCIUM 40 MG/1
40 TABLET, FILM COATED ORAL NIGHTLY
Qty: 30 TABLET | Refills: 0 | Status: SHIPPED | OUTPATIENT
Start: 2023-01-01 | End: 2023-01-01 | Stop reason: SDUPTHER

## 2023-01-01 RX ORDER — PROMETHAZINE HYDROCHLORIDE 25 MG/ML
25 INJECTION, SOLUTION INTRAMUSCULAR; INTRAVENOUS
Status: COMPLETED | OUTPATIENT
Start: 2023-01-01 | End: 2023-01-01

## 2023-01-01 RX ORDER — PANTOPRAZOLE SODIUM 40 MG/1
40 TABLET, DELAYED RELEASE ORAL DAILY
Status: DISCONTINUED | OUTPATIENT
Start: 2023-01-01 | End: 2023-01-01 | Stop reason: HOSPADM

## 2023-01-01 RX ORDER — DOCUSATE SODIUM 100 MG/1
100 CAPSULE, LIQUID FILLED ORAL 2 TIMES DAILY
Qty: 60 CAPSULE | Refills: 0 | OUTPATIENT
Start: 2023-01-01 | End: 2023-01-01 | Stop reason: SDUPTHER

## 2023-01-01 RX ORDER — ACETAMINOPHEN 325 MG/1
650 TABLET ORAL EVERY 6 HOURS
Status: DISPENSED | OUTPATIENT
Start: 2023-01-01 | End: 2023-01-01

## 2023-01-01 RX ORDER — SIMVASTATIN 10 MG/1
TABLET, FILM COATED ORAL
Qty: 90 TABLET | Refills: 1 | Status: ON HOLD | OUTPATIENT
Start: 2023-01-01 | End: 2023-01-01 | Stop reason: HOSPADM

## 2023-01-01 RX ORDER — PROMETHAZINE HYDROCHLORIDE 25 MG/1
25-50 TABLET ORAL EVERY 6 HOURS PRN
COMMUNITY
Start: 2023-01-01 | End: 2023-01-01 | Stop reason: SDUPTHER

## 2023-01-01 RX ORDER — AMLODIPINE BESYLATE 10 MG/1
10 TABLET ORAL DAILY
Qty: 30 TABLET | Refills: 0 | Status: SHIPPED | OUTPATIENT
Start: 2023-01-01 | End: 2023-01-01 | Stop reason: SDUPTHER

## 2023-01-01 RX ORDER — QUETIAPINE FUMARATE 25 MG/1
50 TABLET, FILM COATED ORAL
Status: DISCONTINUED | OUTPATIENT
Start: 2023-01-01 | End: 2023-01-01 | Stop reason: HOSPADM

## 2023-01-01 RX ORDER — LANOLIN ALCOHOL/MO/W.PET/CERES
400 CREAM (GRAM) TOPICAL DAILY
Qty: 30 TABLET | Refills: 0 | Status: SHIPPED | OUTPATIENT
Start: 2023-01-01 | End: 2023-01-01 | Stop reason: SDUPTHER

## 2023-01-01 RX ORDER — GABAPENTIN 300 MG/1
300 CAPSULE ORAL
COMMUNITY
Start: 2023-01-01 | End: 2023-01-01 | Stop reason: SDUPTHER

## 2023-01-01 RX ORDER — GABAPENTIN 100 MG/1
100 CAPSULE ORAL NIGHTLY
Qty: 30 CAPSULE | Refills: 0 | OUTPATIENT
Start: 2023-01-01 | End: 2023-01-01 | Stop reason: SDUPTHER

## 2023-01-01 RX ORDER — BLOOD-GLUCOSE SENSOR
10 EACH MISCELLANEOUS
Qty: 1 EACH | Refills: 11 | Status: SHIPPED | OUTPATIENT
Start: 2023-01-01 | End: 2024-04-11

## 2023-01-01 RX ORDER — MEGESTROL ACETATE 40 MG/ML
400 SUSPENSION ORAL DAILY
Qty: 300 ML | Refills: 0 | Status: SHIPPED | OUTPATIENT
Start: 2023-01-01 | End: 2023-01-01

## 2023-01-01 RX ORDER — SODIUM CHLORIDE, SODIUM GLUCONATE, SODIUM ACETATE, POTASSIUM CHLORIDE AND MAGNESIUM CHLORIDE 30; 37; 368; 526; 502 MG/100ML; MG/100ML; MG/100ML; MG/100ML; MG/100ML
INJECTION, SOLUTION INTRAVENOUS CONTINUOUS
Status: CANCELLED | OUTPATIENT
Start: 2023-01-01 | End: 2023-01-01

## 2023-01-01 RX ORDER — ALPRAZOLAM 0.25 MG/1
0.25 TABLET ORAL DAILY PRN
Status: DISCONTINUED | OUTPATIENT
Start: 2023-01-01 | End: 2023-01-01 | Stop reason: HOSPADM

## 2023-01-01 RX ORDER — BENZONATATE 200 MG/1
200 CAPSULE ORAL 3 TIMES DAILY PRN
Qty: 30 CAPSULE | Refills: 6 | Status: SHIPPED | OUTPATIENT
Start: 2023-01-01 | End: 2023-01-01 | Stop reason: SDUPTHER

## 2023-01-01 RX ORDER — MAGNESIUM SULFATE HEPTAHYDRATE 40 MG/ML
4 INJECTION, SOLUTION INTRAVENOUS ONCE
Status: DISCONTINUED | OUTPATIENT
Start: 2023-01-01 | End: 2023-01-01

## 2023-01-01 RX ORDER — PROCHLORPERAZINE EDISYLATE 5 MG/ML
5 INJECTION INTRAMUSCULAR; INTRAVENOUS EVERY 6 HOURS PRN
Status: DISCONTINUED | OUTPATIENT
Start: 2023-01-01 | End: 2023-01-01 | Stop reason: HOSPADM

## 2023-01-01 RX ORDER — AZITHROMYCIN 250 MG/1
TABLET, FILM COATED ORAL
Qty: 6 TABLET | Refills: 0 | Status: SHIPPED | OUTPATIENT
Start: 2023-01-01 | End: 2023-01-01

## 2023-01-01 RX ORDER — ACETAMINOPHEN 500 MG
1000 TABLET ORAL EVERY 6 HOURS PRN
Status: DISCONTINUED | OUTPATIENT
Start: 2023-01-01 | End: 2023-01-01 | Stop reason: HOSPADM

## 2023-01-01 RX ORDER — FLASH GLUCOSE SCANNING READER
1 EACH MISCELLANEOUS DAILY
Qty: 1 EACH | Refills: 0 | Status: SHIPPED | OUTPATIENT
Start: 2023-01-01

## 2023-01-01 RX ORDER — PROPOFOL 10 MG/ML
VIAL (ML) INTRAVENOUS
Status: DISCONTINUED | OUTPATIENT
Start: 2023-01-01 | End: 2023-01-01

## 2023-01-01 RX ORDER — DIPHENHYDRAMINE HYDROCHLORIDE 50 MG/ML
INJECTION INTRAMUSCULAR; INTRAVENOUS
Status: DISPENSED
Start: 2023-01-01 | End: 2023-01-01

## 2023-01-01 RX ORDER — HYDROCODONE BITARTRATE AND ACETAMINOPHEN 5; 325 MG/1; MG/1
1 TABLET ORAL EVERY 8 HOURS PRN
Qty: 15 TABLET | Refills: 0 | Status: SHIPPED | OUTPATIENT
Start: 2023-01-01 | End: 2023-01-01

## 2023-01-01 RX ORDER — LANOLIN ALCOHOL/MO/W.PET/CERES
100 CREAM (GRAM) TOPICAL DAILY
Qty: 30 TABLET | Refills: 0 | Status: SHIPPED | OUTPATIENT
Start: 2023-01-01 | End: 2023-01-01

## 2023-01-01 RX ORDER — DIPHENHYDRAMINE HYDROCHLORIDE 50 MG/ML
25 INJECTION INTRAMUSCULAR; INTRAVENOUS ONCE
Status: COMPLETED | OUTPATIENT
Start: 2023-01-01 | End: 2023-01-01

## 2023-01-01 RX ORDER — BUTALBITAL, ACETAMINOPHEN AND CAFFEINE 50; 325; 40 MG/1; MG/1; MG/1
1 TABLET ORAL EVERY 4 HOURS PRN
Status: DISCONTINUED | OUTPATIENT
Start: 2023-01-01 | End: 2023-01-01 | Stop reason: HOSPADM

## 2023-01-01 RX ORDER — LANOLIN ALCOHOL/MO/W.PET/CERES
400 CREAM (GRAM) TOPICAL DAILY
Status: DISCONTINUED | OUTPATIENT
Start: 2023-01-01 | End: 2023-01-01 | Stop reason: HOSPADM

## 2023-01-01 RX ORDER — HYDROCODONE BITARTRATE AND ACETAMINOPHEN 5; 325 MG/1; MG/1
1 TABLET ORAL EVERY 6 HOURS PRN
Status: DISCONTINUED | OUTPATIENT
Start: 2023-01-01 | End: 2023-01-01 | Stop reason: HOSPADM

## 2023-01-01 RX ORDER — INSULIN DETEMIR 100 [IU]/ML
65 INJECTION, SOLUTION SUBCUTANEOUS NIGHTLY
Qty: 19.5 ML | Refills: 0 | Status: SHIPPED | OUTPATIENT
Start: 2023-01-01 | End: 2023-01-01

## 2023-01-01 RX ORDER — DEXTROSE MONOHYDRATE AND SODIUM CHLORIDE 5; .45 G/100ML; G/100ML
1000 INJECTION, SOLUTION INTRAVENOUS
Status: DISCONTINUED | OUTPATIENT
Start: 2023-01-01 | End: 2023-01-01

## 2023-01-01 RX ORDER — ACETAMINOPHEN 325 MG/1
650 TABLET ORAL EVERY 4 HOURS PRN
Status: DISCONTINUED | OUTPATIENT
Start: 2023-01-01 | End: 2023-01-01 | Stop reason: HOSPADM

## 2023-01-01 RX ORDER — LIDOCAINE HYDROCHLORIDE 20 MG/ML
INJECTION INTRAVENOUS
Status: DISCONTINUED | OUTPATIENT
Start: 2023-01-01 | End: 2023-01-01

## 2023-01-01 RX ORDER — PANTOPRAZOLE SODIUM 40 MG/10ML
40 INJECTION, POWDER, LYOPHILIZED, FOR SOLUTION INTRAVENOUS 2 TIMES DAILY
Status: DISCONTINUED | OUTPATIENT
Start: 2023-01-01 | End: 2023-01-01

## 2023-01-01 RX ORDER — BLOOD-GLUCOSE TRANSMITTER
1 EACH MISCELLANEOUS
Qty: 1 EACH | Refills: 11 | Status: SHIPPED | OUTPATIENT
Start: 2023-01-01 | End: 2024-04-11

## 2023-01-01 RX ORDER — OLMESARTAN MEDOXOMIL 40 MG/1
40 TABLET ORAL DAILY
Qty: 30 TABLET | Refills: 0 | Status: SHIPPED | OUTPATIENT
Start: 2023-01-01 | End: 2023-01-01 | Stop reason: SDUPTHER

## 2023-01-01 RX ADMIN — DIPHENHYDRAMINE HYDROCHLORIDE 25 MG: 25 CAPSULE ORAL at 08:07

## 2023-01-01 RX ADMIN — ENOXAPARIN SODIUM 40 MG: 40 INJECTION SUBCUTANEOUS at 06:07

## 2023-01-01 RX ADMIN — QUETIAPINE FUMARATE 25 MG: 25 TABLET ORAL at 06:07

## 2023-01-01 RX ADMIN — BUTALBITAL, ACETAMINOPHEN, AND CAFFEINE 1 TABLET: 325; 50; 40 TABLET ORAL at 07:07

## 2023-01-01 RX ADMIN — INSULIN ASPART 5 UNITS: 100 INJECTION, SOLUTION INTRAVENOUS; SUBCUTANEOUS at 12:07

## 2023-01-01 RX ADMIN — ALPRAZOLAM 0.25 MG: 0.25 TABLET ORAL at 09:07

## 2023-01-01 RX ADMIN — THIAMINE HCL TAB 100 MG 100 MG: 100 TAB at 08:07

## 2023-01-01 RX ADMIN — INSULIN DETEMIR 20 UNITS: 100 INJECTION, SOLUTION SUBCUTANEOUS at 08:07

## 2023-01-01 RX ADMIN — Medication 1 TABLET: at 10:07

## 2023-01-01 RX ADMIN — GABAPENTIN 100 MG: 100 CAPSULE ORAL at 09:07

## 2023-01-01 RX ADMIN — ALPRAZOLAM 0.25 MG: 0.25 TABLET ORAL at 08:07

## 2023-01-01 RX ADMIN — AMPICILLIN AND SULBACTAM 3 G: 2; 1 INJECTION, POWDER, FOR SOLUTION INTRAVENOUS at 01:07

## 2023-01-01 RX ADMIN — CLONIDINE 1 PATCH: 0.2 PATCH TRANSDERMAL at 02:07

## 2023-01-01 RX ADMIN — DIPHENHYDRAMINE HYDROCHLORIDE 25 MG: 25 CAPSULE ORAL at 05:07

## 2023-01-01 RX ADMIN — DIPHENHYDRAMINE HYDROCHLORIDE 25 MG: 25 CAPSULE ORAL at 04:07

## 2023-01-01 RX ADMIN — COLLAGENASE SANTYL: 250 OINTMENT TOPICAL at 02:07

## 2023-01-01 RX ADMIN — HYDRALAZINE HYDROCHLORIDE 50 MG: 50 TABLET, FILM COATED ORAL at 09:07

## 2023-01-01 RX ADMIN — ATORVASTATIN CALCIUM 40 MG: 40 TABLET, FILM COATED ORAL at 08:07

## 2023-01-01 RX ADMIN — ATORVASTATIN CALCIUM 40 MG: 40 TABLET, FILM COATED ORAL at 09:07

## 2023-01-01 RX ADMIN — PANTOPRAZOLE SODIUM 40 MG: 40 TABLET, DELAYED RELEASE ORAL at 09:07

## 2023-01-01 RX ADMIN — Medication 400 MG: at 10:07

## 2023-01-01 RX ADMIN — CLOPIDOGREL BISULFATE 75 MG: 75 TABLET ORAL at 10:07

## 2023-01-01 RX ADMIN — MAGNESIUM SULFATE HEPTAHYDRATE 4 G: 40 INJECTION, SOLUTION INTRAVENOUS at 08:07

## 2023-01-01 RX ADMIN — PANTOPRAZOLE SODIUM 40 MG: 40 TABLET, DELAYED RELEASE ORAL at 08:07

## 2023-01-01 RX ADMIN — GABAPENTIN 300 MG: 300 CAPSULE ORAL at 08:07

## 2023-01-01 RX ADMIN — HYDROCODONE BITARTRATE AND ACETAMINOPHEN 1 TABLET: 5; 325 TABLET ORAL at 08:07

## 2023-01-01 RX ADMIN — HYDRALAZINE HYDROCHLORIDE 10 MG: 20 INJECTION INTRAMUSCULAR; INTRAVENOUS at 04:07

## 2023-01-01 RX ADMIN — ACETAMINOPHEN 1000 MG: 500 TABLET, FILM COATED ORAL at 04:07

## 2023-01-01 RX ADMIN — SODIUM CHLORIDE, POTASSIUM CHLORIDE, SODIUM LACTATE AND CALCIUM CHLORIDE: 600; 310; 30; 20 INJECTION, SOLUTION INTRAVENOUS at 08:07

## 2023-01-01 RX ADMIN — HYDRALAZINE HYDROCHLORIDE 50 MG: 50 TABLET, FILM COATED ORAL at 08:07

## 2023-01-01 RX ADMIN — INSULIN ASPART 3 UNITS: 100 INJECTION, SOLUTION INTRAVENOUS; SUBCUTANEOUS at 12:07

## 2023-01-01 RX ADMIN — HYDRALAZINE HYDROCHLORIDE 50 MG: 50 TABLET, FILM COATED ORAL at 07:07

## 2023-01-01 RX ADMIN — INSULIN ASPART 2 UNITS: 100 INJECTION, SOLUTION INTRAVENOUS; SUBCUTANEOUS at 08:07

## 2023-01-01 RX ADMIN — MEGESTROL ACETATE 400 MG: 400 SUSPENSION ORAL at 07:07

## 2023-01-01 RX ADMIN — HYDRALAZINE HYDROCHLORIDE 50 MG: 50 TABLET, FILM COATED ORAL at 04:07

## 2023-01-01 RX ADMIN — MEGESTROL ACETATE 400 MG: 400 SUSPENSION ORAL at 09:07

## 2023-01-01 RX ADMIN — PANTOPRAZOLE SODIUM 40 MG: 40 INJECTION, POWDER, FOR SOLUTION INTRAVENOUS at 08:07

## 2023-01-01 RX ADMIN — Medication 1 TABLET: at 09:07

## 2023-01-01 RX ADMIN — ACETAMINOPHEN 650 MG: 325 TABLET, FILM COATED ORAL at 06:07

## 2023-01-01 RX ADMIN — METOPROLOL SUCCINATE 50 MG: 50 TABLET, EXTENDED RELEASE ORAL at 09:07

## 2023-01-01 RX ADMIN — HYDROCODONE BITARTRATE AND ACETAMINOPHEN 1 TABLET: 5; 325 TABLET ORAL at 12:07

## 2023-01-01 RX ADMIN — INSULIN ASPART 3 UNITS: 100 INJECTION, SOLUTION INTRAVENOUS; SUBCUTANEOUS at 06:07

## 2023-01-01 RX ADMIN — Medication 1 TABLET: at 08:07

## 2023-01-01 RX ADMIN — INSULIN ASPART 2 UNITS: 100 INJECTION, SOLUTION INTRAVENOUS; SUBCUTANEOUS at 12:07

## 2023-01-01 RX ADMIN — CLOPIDOGREL BISULFATE 75 MG: 75 TABLET ORAL at 08:07

## 2023-01-01 RX ADMIN — INSULIN ASPART 1 UNITS: 100 INJECTION, SOLUTION INTRAVENOUS; SUBCUTANEOUS at 08:07

## 2023-01-01 RX ADMIN — ACETAMINOPHEN 1000 MG: 500 TABLET, FILM COATED ORAL at 03:07

## 2023-01-01 RX ADMIN — ACETAMINOPHEN 650 MG: 325 TABLET, FILM COATED ORAL at 04:07

## 2023-01-01 RX ADMIN — QUETIAPINE FUMARATE 50 MG: 25 TABLET ORAL at 06:07

## 2023-01-01 RX ADMIN — ACETAMINOPHEN 1000 MG: 500 TABLET, FILM COATED ORAL at 09:07

## 2023-01-01 RX ADMIN — PROPOFOL 30 MG: 10 INJECTION, EMULSION INTRAVENOUS at 04:07

## 2023-01-01 RX ADMIN — BUTALBITAL, ACETAMINOPHEN, AND CAFFEINE 1 TABLET: 325; 50; 40 TABLET ORAL at 08:07

## 2023-01-01 RX ADMIN — AMPICILLIN SODIUM AND SULBACTAM SODIUM 3 G: 2; 1 INJECTION, POWDER, FOR SOLUTION INTRAMUSCULAR; INTRAVENOUS at 02:07

## 2023-01-01 RX ADMIN — METOPROLOL SUCCINATE 50 MG: 50 TABLET, EXTENDED RELEASE ORAL at 08:07

## 2023-01-01 RX ADMIN — BARIUM SULFATE 355 ML: 0.6 SUSPENSION ORAL at 12:07

## 2023-01-01 RX ADMIN — DIPHENHYDRAMINE HYDROCHLORIDE 25 MG: 50 INJECTION INTRAMUSCULAR; INTRAVENOUS at 02:07

## 2023-01-01 RX ADMIN — ONDANSETRON 4 MG: 2 INJECTION INTRAMUSCULAR; INTRAVENOUS at 04:07

## 2023-01-01 RX ADMIN — SODIUM CHLORIDE: 9 INJECTION, SOLUTION INTRAVENOUS at 04:07

## 2023-01-01 RX ADMIN — DIPHENHYDRAMINE HYDROCHLORIDE 25 MG: 25 CAPSULE ORAL at 10:07

## 2023-01-01 RX ADMIN — SODIUM CHLORIDE, POTASSIUM CHLORIDE, SODIUM LACTATE AND CALCIUM CHLORIDE: 600; 310; 30; 20 INJECTION, SOLUTION INTRAVENOUS at 10:07

## 2023-01-01 RX ADMIN — Medication 6 MG: at 08:07

## 2023-01-01 RX ADMIN — SODIUM CHLORIDE 1000 ML: 9 INJECTION, SOLUTION INTRAVENOUS at 11:06

## 2023-01-01 RX ADMIN — MEGESTROL ACETATE 400 MG: 400 SUSPENSION ORAL at 10:07

## 2023-01-01 RX ADMIN — AMPICILLIN AND SULBACTAM 3 G: 2; 1 INJECTION, POWDER, FOR SOLUTION INTRAVENOUS at 12:07

## 2023-01-01 RX ADMIN — CLOPIDOGREL BISULFATE 75 MG: 75 TABLET ORAL at 09:07

## 2023-01-01 RX ADMIN — DIPHENHYDRAMINE HYDROCHLORIDE 25 MG: 25 CAPSULE ORAL at 06:07

## 2023-01-01 RX ADMIN — PANTOPRAZOLE SODIUM 40 MG: 40 TABLET, DELAYED RELEASE ORAL at 10:07

## 2023-01-01 RX ADMIN — LEUCINE, PHENYLALANINE, LYSINE, METHIONINE, ISOLEUCINE, VALINE, HISTIDINE, THREONINE, TRYPTOPHAN, ALANINE, GLYCINE, ARGININE, PROLINE, SERINE, TYROSINE, SODIUM ACETATE, DIBASIC POTASSIUM PHOSPHATE, MAGNESIUM CHLORIDE, SODIUM CHLORIDE, CALCIUM CHLORIDE, DEXTROSE
311; 238; 247; 170; 255; 247; 204; 179; 77; 880; 438; 489; 289; 213; 17; 297; 261; 51; 77; 33; 5 INJECTION INTRAVENOUS at 05:07

## 2023-01-01 RX ADMIN — COLLAGENASE SANTYL: 250 OINTMENT TOPICAL at 10:07

## 2023-01-01 RX ADMIN — Medication 400 MG: at 09:07

## 2023-01-01 RX ADMIN — SODIUM CHLORIDE, POTASSIUM CHLORIDE, SODIUM LACTATE AND CALCIUM CHLORIDE: 600; 310; 30; 20 INJECTION, SOLUTION INTRAVENOUS at 04:07

## 2023-01-01 RX ADMIN — ACETAMINOPHEN 1000 MG: 500 TABLET, FILM COATED ORAL at 08:07

## 2023-01-01 RX ADMIN — AMLODIPINE BESYLATE 10 MG: 5 TABLET ORAL at 09:07

## 2023-01-01 RX ADMIN — HYDRALAZINE HYDROCHLORIDE 50 MG: 50 TABLET, FILM COATED ORAL at 05:07

## 2023-01-01 RX ADMIN — ZOLPIDEM TARTRATE 5 MG: 5 TABLET ORAL at 08:07

## 2023-01-01 RX ADMIN — AMLODIPINE BESYLATE 10 MG: 5 TABLET ORAL at 08:07

## 2023-01-01 RX ADMIN — DIPHENHYDRAMINE HYDROCHLORIDE 25 MG: 25 CAPSULE ORAL at 09:07

## 2023-01-01 RX ADMIN — LINACLOTIDE 145 MCG: 145 CAPSULE, GELATIN COATED ORAL at 05:07

## 2023-01-01 RX ADMIN — GABAPENTIN 100 MG: 100 CAPSULE ORAL at 08:07

## 2023-01-01 RX ADMIN — THIAMINE HCL TAB 100 MG 100 MG: 100 TAB at 10:07

## 2023-01-01 RX ADMIN — Medication 400 MG: at 08:07

## 2023-01-01 RX ADMIN — AMLODIPINE BESYLATE 10 MG: 5 TABLET ORAL at 07:07

## 2023-01-01 RX ADMIN — PROPOFOL 20 MG: 10 INJECTION, EMULSION INTRAVENOUS at 04:07

## 2023-01-01 RX ADMIN — HYDRALAZINE HYDROCHLORIDE 50 MG: 50 TABLET, FILM COATED ORAL at 02:07

## 2023-01-01 RX ADMIN — LIDOCAINE HYDROCHLORIDE 100 MG: 20 INJECTION INTRAVENOUS at 04:07

## 2023-01-01 RX ADMIN — CYANOCOBALAMIN 1000 MCG: 1000 INJECTION, SOLUTION INTRAMUSCULAR; SUBCUTANEOUS at 09:07

## 2023-01-01 RX ADMIN — ENOXAPARIN SODIUM 40 MG: 40 INJECTION SUBCUTANEOUS at 05:07

## 2023-01-01 RX ADMIN — INSULIN DETEMIR 10 UNITS: 100 INJECTION, SOLUTION SUBCUTANEOUS at 09:07

## 2023-01-01 RX ADMIN — DEXTROSE MONOHYDRATE 250 ML: 100 INJECTION, SOLUTION INTRAVENOUS at 09:06

## 2023-01-01 RX ADMIN — THIAMINE HCL TAB 100 MG 100 MG: 100 TAB at 09:07

## 2023-01-01 RX ADMIN — ACETAMINOPHEN 1000 MG: 500 TABLET, FILM COATED ORAL at 02:07

## 2023-01-01 RX ADMIN — ENOXAPARIN SODIUM 40 MG: 40 INJECTION SUBCUTANEOUS at 04:07

## 2023-01-01 RX ADMIN — AMPICILLIN SODIUM AND SULBACTAM SODIUM 3 G: 2; 1 INJECTION, POWDER, FOR SOLUTION INTRAMUSCULAR; INTRAVENOUS at 03:07

## 2023-01-01 RX ADMIN — PANTOPRAZOLE SODIUM 40 MG: 40 INJECTION, POWDER, FOR SOLUTION INTRAVENOUS at 02:07

## 2023-01-01 RX ADMIN — CYANOCOBALAMIN 1000 MCG: 1000 INJECTION, SOLUTION INTRAMUSCULAR; SUBCUTANEOUS at 06:07

## 2023-01-01 RX ADMIN — MEGESTROL ACETATE 400 MG: 400 SUSPENSION ORAL at 08:07

## 2023-01-01 RX ADMIN — SODIUM CHLORIDE: 9 INJECTION, SOLUTION INTRAVENOUS at 12:07

## 2023-01-01 RX ADMIN — INSULIN ASPART 3 UNITS: 100 INJECTION, SOLUTION INTRAVENOUS; SUBCUTANEOUS at 04:07

## 2023-01-01 RX ADMIN — DIPHENHYDRAMINE HYDROCHLORIDE 25 MG: 25 CAPSULE ORAL at 07:07

## 2023-01-01 RX ADMIN — COLLAGENASE SANTYL: 250 OINTMENT TOPICAL at 08:07

## 2023-01-01 RX ADMIN — AMLODIPINE BESYLATE 10 MG: 5 TABLET ORAL at 10:07

## 2023-01-01 RX ADMIN — INSULIN DETEMIR 20 UNITS: 100 INJECTION, SOLUTION SUBCUTANEOUS at 09:07

## 2023-01-01 RX ADMIN — INSULIN DETEMIR 20 UNITS: 100 INJECTION, SOLUTION SUBCUTANEOUS at 10:07

## 2023-01-01 RX ADMIN — METOPROLOL SUCCINATE 50 MG: 50 TABLET, EXTENDED RELEASE ORAL at 10:07

## 2023-01-01 RX ADMIN — CYANOCOBALAMIN 1000 MCG: 1000 INJECTION, SOLUTION INTRAMUSCULAR; SUBCUTANEOUS at 08:07

## 2023-01-01 RX ADMIN — PROMETHAZINE HYDROCHLORIDE 25 MG: 25 INJECTION INTRAMUSCULAR; INTRAVENOUS at 10:06

## 2023-01-01 RX ADMIN — AMPICILLIN SODIUM AND SULBACTAM SODIUM 3 G: 2; 1 INJECTION, POWDER, FOR SOLUTION INTRAMUSCULAR; INTRAVENOUS at 01:07

## 2023-01-01 RX ADMIN — ACETAMINOPHEN 650 MG: 325 TABLET, FILM COATED ORAL at 12:07

## 2023-01-03 NOTE — TELEPHONE ENCOUNTER
----- Message from Dalia Pino sent at 1/3/2023  9:07 AM CST -----  Regarding: test strips  Patient says her sugars go up and down and wants to talk to nurse about getting test strips for 4 times a day. Call her at [phone]

## 2023-04-11 NOTE — ASSESSMENT & PLAN NOTE
Lab Results   Component Value Date    HGBA1C 7.5 (H) 04/06/2023   -poorly controlled   -patient reports not compliant with diabetic diet, discussion had with patient/ regarding need to maintain   -currently on metformin 500 mg b.i.d., continue  -previously initiated on Farxiga, however stopped taking due to cost; discontinue  -also on Levemir 60 units nightly, is 65 units nightly  -also on NovoLog 8 units t.i.d. with meals, increase to 10 units t.i.d. with meals  -recommended referral to diabetic educator, patient declined

## 2023-04-11 NOTE — PROGRESS NOTES
"     Patient Care Team:  Yan Blanco MD as PCP - General (Internal Medicine)  MD Ketan Gutierrez MD as Consulting Physician (Surgical Oncology)  Junaid Warren MD as Consulting Physician (Gastroenterology)  Ketan Deng MD as Consulting Physician (Otolaryngology)  Wilson Gaffney MD as Consulting Physician (Cardiology)      Patient ID: Denise Oleary is a 82 y.o. female.    Chief Complaint: Medicare AWV (Wellness)      HPI:     82-year-old female with CKD 3, DM 2, HTN, HLD,  GERD,anxiety/depression, insomnia, and RLS.   History also significant for pancreatic cyst  and ductal dilation with prior workup including MRI, EUS and FNA with no evidence of malignancy now currently only under surveillance.   Today presents for a wellness visit.  Wellness labs reviewed with noted abnormalities, however patient remarks completed blood work sick with COVID.  Discussed repeating blood work or more valid presentation.  Most recent HGB A1c noted 7. 5 worsened from previous  7.3.  Last visit initiated on Farxiga since also with CKD 3, however patient reports did not take since medication was too costly.  patient reports not compliant with diabetic diet,  remarks "she eats more rice than I do". c  Lengthy discussion had with patient regarding standards of diabetic care.  We will be increasing both her Levemir and NovoLog.  Discussion had regarding referral to diabetic educator, however patient declined.  Patient and  both informed of risk of continued uncontrolled diabetes and worsening CKD.  Immunizations reviewed, encouraged to obtain outstanding vaccines age-appropriate screenings up-to-date.  Otherwise No other acute medical concerns noted.         Wellness:  Today 04/11/2023    MEDICAL HISTORY:    Past Medical History:   Diagnosis Date    Altered mental status     Anxiety and depression     Class 2 severe obesity due to excess calories with serious comorbidity and body mass index " "(BMI) of 35.0 to 35.9 in adult 4/12/2023    Gastroesophageal reflux disease without esophagitis 10/4/2022    Hypertension     Mixed hyperlipidemia     Primary insomnia     Restless leg syndrome     Stage 3b chronic kidney disease 10/4/2022      Past Surgical History:   Procedure Laterality Date    Excision of Pancreas      Excision of Stomach      Gastrointestinal Biopsy      HEMORRHOID SURGERY      TOTAL KNEE ARTHROPLASTY      TUBAL LIGATION        Social History     Tobacco Use    Smoking status: Never    Smokeless tobacco: Never   Substance Use Topics    Alcohol use: Not Currently          Health Maintenance Due   Topic Date Due    COVID-19 Vaccine (1) 1940    Pneumococcal Vaccines (Age 65+) (1 - PCV) Never done    TETANUS VACCINE  Never done    Shingles Vaccine (1 of 2) Never done        SUBJECTIVE:     Review of Systems   Constitutional:  Negative for fatigue and fever.   HENT:  Negative for congestion, rhinorrhea, sore throat and trouble swallowing.    Eyes:  Negative for redness and visual disturbance.   Respiratory:  Negative for cough, chest tightness and shortness of breath.    Cardiovascular:  Negative for chest pain and palpitations.   Gastrointestinal:  Negative for abdominal pain, constipation, diarrhea, nausea and vomiting.   Genitourinary:  Negative for dysuria, flank pain, frequency and urgency.   Musculoskeletal:  Negative for arthralgias, gait problem and myalgias.   Skin:  Negative for rash and wound.   Neurological:  Negative for facial asymmetry, speech difficulty, weakness and headaches.   All other systems reviewed and are negative.      Patient Reported Health Risk Assessment       OBJECTIVE:     /74 (BP Location: Left arm, Patient Position: Sitting, BP Method: Large (Manual))   Pulse 80   Ht 5' 2.01" (1.575 m)   Wt 89.1 kg (196 lb 6.4 oz)   SpO2 96%   BMI 35.91 kg/m²      Physical Exam  Constitutional:       General: She is not in acute distress.     Appearance: Normal " appearance. She is obese.   HENT:      Right Ear: Tympanic membrane, ear canal and external ear normal.      Left Ear: Tympanic membrane, ear canal and external ear normal.      Nose: Nose normal.      Mouth/Throat:      Mouth: Mucous membranes are moist.      Pharynx: Oropharynx is clear.   Eyes:      Extraocular Movements: Extraocular movements intact.      Conjunctiva/sclera: Conjunctivae normal.      Pupils: Pupils are equal, round, and reactive to light.   Cardiovascular:      Rate and Rhythm: Normal rate and regular rhythm.      Pulses: Normal pulses.      Heart sounds: Normal heart sounds. No murmur heard.    No gallop.   Pulmonary:      Effort: Pulmonary effort is normal.      Breath sounds: Normal breath sounds. No wheezing.   Abdominal:      General: Bowel sounds are normal. There is no distension.      Palpations: Abdomen is soft. There is no mass.      Tenderness: There is no abdominal tenderness. There is no guarding.   Musculoskeletal:         General: Normal range of motion.   Skin:     General: Skin is warm and dry.   Neurological:      Mental Status: She is alert. Mental status is at baseline.      Sensory: No sensory deficit.      Motor: No weakness.           No flowsheet data found.  Fall Risk Assessment - Outpatient 4/11/2023 10/4/2022   Mobility Status Ambulatory Ambulatory   Number of falls 0 0   Identified as fall risk 0 0                        ASSEMENT/ PLAN:       ICD-10-CM ICD-9-CM   1. Medicare annual wellness visit, subsequent  Z00.00 V70.0   2. Type 2 diabetes mellitus with stage 3b chronic kidney disease, with long-term current use of insulin  E11.22 250.40    N18.32 585.3    Z79.4 V58.67   3. Stage 3b chronic kidney disease  N18.32 585.3   4. Primary hypertension  I10 401.9   5. Mixed hyperlipidemia  E78.2 272.2   6. Class 2 severe obesity due to excess calories with serious comorbidity and body mass index (BMI) of 35.0 to 35.9 in adult  E66.01 278.01    Z68.35 V85.35   7. Restless  leg syndrome  G25.81 333.94   8. Anxiety and depression  F41.9 300.00    F32.A 311   9. Gastroesophageal reflux disease without esophagitis  K21.9 530.81   10. Abnormal laboratory test  R89.9 796.4   11. History of COVID-19  Z86.16 V12.09         Plan:     Problem List Items Addressed This Visit          Neuro    Restless leg syndrome (Chronic)     -stable   -currently on gabapentin and Requip, continue              Psychiatric    Anxiety and depression (Chronic)     -stable   -on Prozac and Xanax, continue                Cardiac/Vascular    Hypertension (Chronic)     -/74   -currently well-controlled on amlodipine, hydralazine, metoprolol succinate, continue  -low-sodium diet           Mixed hyperlipidemia (Chronic)     -stable   -currently on simvastatin, continue  -low-cholesterol diet              Renal/    Stage 3b chronic kidney disease (Chronic)     -GFR 33   -previously initiate help with diabetes and CKD, however patient stopped due to financial cost ; discontinue  -lengthy discussion had with patient/ regarding need for enhance control of diabetes   -increase hydration   -avoid nephrotoxic drugs (NSAIDs)   -currently on Arb, continue           Relevant Orders    Comprehensive Metabolic Panel       Endocrine    Type 2 diabetes mellitus with stage 3b chronic kidney disease, with long-term current use of insulin (Chronic)     Lab Results   Component Value Date    HGBA1C 7.5 (H) 04/06/2023   -poorly controlled   -patient reports not compliant with diabetic diet, discussion had with patient/ regarding need to maintain   -currently on metformin 500 mg b.i.d., continue  -previously initiated on Farxiga, however stopped taking due to cost; discontinue  -also on Levemir 60 units nightly, is 65 units nightly  -also on NovoLog 8 units t.i.d. with meals, increase to 10 units t.i.d. with meals  -recommended referral to diabetic educator, patient declined         Relevant Medications    LEVEMIR  FLEXTOUCH U-100 INSULN 100 unit/mL (3 mL) InPn pen    NOVOLOG FLEXPEN U-100 INSULIN 100 unit/mL (3 mL) InPn pen    Class 2 severe obesity due to excess calories with serious comorbidity and body mass index (BMI) of 35.0 to 35.9 in adult (Chronic)     -BMI 35.91   -encourage low-calorie low carb diet   -encourage some form of routine aerobic exercise for weight loss   -encouraged 5-10 lb weight loss for next              GI    Gastroesophageal reflux disease without esophagitis (Chronic)     -stable   -currently on Protonix and sucralfate, continue  -avoid food triggers              Other    Abnormal laboratory test     -completed blood work while COVID, repeat CBC and CMP since does have several abnormal           Medicare annual wellness visit, subsequent - Primary     -patient feeling generally well today  -wellness labs completed with active COVID infection with several malaise, will repeat labs within next few days  -age-appropriate screenings up-to-date  -immunizations out of date, recommend to obtain missing vaccines-follow-up next  -encourage routine aerobic exercise 2 to 3 times a week  -increase fluid hydration            Other Visit Diagnoses       History of COVID-19        Relevant Orders    Comprehensive Metabolic Panel    CBC Auto Differential            Advance Care Planning   I attest to discussing Advance Care Planning with patient and/or family member.  Education was provided including the importance of the Health Care Power of , Advance Directives, and/or [place] documentation.       Opioid Screening: Patient medication list reviewed, patient is not taking prescription opioids. Patient is not using additional opioids than prescribed. Patient is at low risk of substance abuse based on this opioid use history.         Follow up in about 4 months (around 8/11/2023) for Diabetes, with labs prior.   -plan specifics discussed above    Orders Placed This Encounter    Comprehensive Metabolic Panel     CBC Auto Differential    LEVEMIR FLEXTOUCH U-100 INSULN 100 unit/mL (3 mL) InPn pen    NOVOLOG FLEXPEN U-100 INSULIN 100 unit/mL (3 mL) InPn pen        Medication List with Changes/Refills   Current Medications    ADULT LOW DOSE ASPIRIN 81 MG EC TABLET        ALPRAZOLAM (XANAX) 0.25 MG TABLET    Take 1 tablet (0.25 mg total) by mouth daily as needed for Anxiety.    AMLODIPINE (NORVASC) 5 MG TABLET    Take 5 mg by mouth once daily.    AZELASTINE (ASTELIN) 137 MCG (0.1 %) NASAL SPRAY    2 sprays once daily.    CALCIUM-VITAMIN D3 (OS-MARIAJOSE 500 + D3) 500 MG-5 MCG (200 UNIT) PER TABLET    Take 1 tablet by mouth.    CONTOUR NEXT EZ METER MISC    use as directed    CONTOUR NEXT TEST STRIPS STRP    1 strip by Misc.(Non-Drug; Combo Route) route 4 (four) times daily.    DIPHENOXYLATE-ATROPINE 2.5-0.025 MG (LOMOTIL) 2.5-0.025 MG PER TABLET    Take 1 tablet by mouth every 6 (six) hours.    DOXYCYCLINE MONOHYDRATE 100 MG TAB    SMARTSI Tablet(s) By Mouth Morning-Night    FAMOTIDINE (PEPCID) 40 MG TABLET    Take 40 mg by mouth nightly.    FLUOXETINE 60 MG TAB    Take 60 mg by mouth once daily.    FLUTICASONE PROPIONATE (FLONASE) 50 MCG/ACTUATION NASAL SPRAY    2 sprays by Each Nostril route once daily.    HYDRALAZINE (APRESOLINE) 25 MG TABLET    taek ONE TABLET BY MOUTH TWICE DAILY    HYDROXYZINE HCL (ATARAX) 25 MG TABLET    Take 1 tablet (25 mg total) by mouth daily as needed.    METFORMIN (GLUCOPHAGE) 500 MG TABLET    TAKE TWO TABLETS BY MOUTH EVERY MORNING AND TWO TABLETS BY MOUTH AT BEDTIME    METOPROLOL SUCCINATE (TOPROL-XL) 50 MG 24 HR TABLET    TAKE ONE TABLET BY MOUTH TWICE DAILY    MICROLET LANCET MISC    CHECK BLOOD SUGAR TWICE DAILY    NYSTATIN (MYCOSTATIN) CREAM    Apply topically 2 (two) times daily.    OLMESARTAN-HYDROCHLOROTHIAZIDE (BENICAR HCT) 40-25 MG PER TABLET    Take 1 tablet by mouth every morning.    PANTOPRAZOLE (PROTONIX) 40 MG TABLET    Take 40 mg by mouth once daily.    ROPINIROLE (REQUIP) 1 MG  TABLET    Take 1 tablet (1 mg total) by mouth daily as needed.    SIMVASTATIN (ZOCOR) 10 MG TABLET    TAKE ONE TABLET BY MOUTH IN THE EVENING    SUCRALFATE (CARAFATE) 1 GRAM TABLET    Take 1 g by mouth 4 (four) times daily.    TRIAMCINOLONE ACETONIDE 0.1% (KENALOG) 0.1 % CREAM    Apply topically nightly.    ZOLPIDEM (AMBIEN) 5 MG TAB    Take 1 tablet (5 mg total) by mouth nightly as needed.   Changed and/or Refilled Medications    Modified Medication Previous Medication    GABAPENTIN (NEURONTIN) 300 MG CAPSULE gabapentin (NEURONTIN) 300 MG capsule       Take 1 capsule (300 mg total) by mouth once daily.    Take 300 mg by mouth.    LEVEMIR FLEXTOUCH U-100 INSULN 100 UNIT/ML (3 ML) INPN PEN LEVEMIR FLEXTOUCH U-100 INSULN 100 unit/mL (3 mL) InPn pen       Inject 65 Units into the skin every evening.    SMARTSI Unit(s) SUB-Q Daily    NOVOLOG FLEXPEN U-100 INSULIN 100 UNIT/ML (3 ML) INPN PEN NOVOLOG FLEXPEN U-100 INSULIN 100 unit/mL (3 mL) InPn pen       Inject 10 Units into the skin 3 (three) times daily with meals.    Inject 8 Units into the skin 3 (three) times daily.    PROMETHAZINE (PHENERGAN) 25 MG TABLET promethazine (PHENERGAN) 25 MG tablet       Take 1-2 tablets (25-50 mg total) by mouth every 6 (six) hours as needed for Nausea.    Take 25-50 mg by mouth every 6 (six) hours as needed.   Discontinued Medications    ESCITALOPRAM OXALATE (LEXAPRO) 10 MG TABLET    Take 10 mg by mouth.    FARXIGA 5 MG TAB TABLET    Take 1 tablet (5 mg total) by mouth once daily.    GABAPENTIN (NEURONTIN) 100 MG CAPSULE    Take 100 mg by mouth 3 (three) times daily.    ONDANSETRON (ZOFRAN) 4 MG TABLET    Take 4 mg by mouth every 6 (six) hours as needed.

## 2023-04-11 NOTE — Clinical Note
Patient requesting to be set up with continuous glucose monitor, please send in prescription for freestyle Leonard 2 and associated part.  Diagnosis: insulin-dependent type 2 diabetes    and   uncontrolled diabetes

## 2023-04-11 NOTE — ASSESSMENT & PLAN NOTE
-/74   -currently well-controlled on amlodipine, hydralazine, metoprolol succinate, continue  -low-sodium diet

## 2023-04-11 NOTE — ASSESSMENT & PLAN NOTE
-GFR 33   -previously initiate help with diabetes and CKD, however patient stopped due to financial cost ; discontinue  -lengthy discussion had with patient/ regarding need for enhance control of diabetes   -increase hydration   -avoid nephrotoxic drugs (NSAIDs)   -currently on Arb, continue

## 2023-04-11 NOTE — Clinical Note
Please inform patient that I also increase her NovoLog 8 units 3 times a day with meals to 10 units 3 times a day with meals.  Will keep previously increased Levemir at 65 units nightly.

## 2023-04-12 PROBLEM — E66.01 CLASS 2 SEVERE OBESITY DUE TO EXCESS CALORIES WITH SERIOUS COMORBIDITY AND BODY MASS INDEX (BMI) OF 35.0 TO 35.9 IN ADULT: Chronic | Status: ACTIVE | Noted: 2023-01-01

## 2023-04-12 PROBLEM — R89.9 ABNORMAL LABORATORY TEST: Status: ACTIVE | Noted: 2023-01-01

## 2023-04-12 PROBLEM — Z00.00 MEDICARE ANNUAL WELLNESS VISIT, SUBSEQUENT: Status: ACTIVE | Noted: 2023-01-01

## 2023-04-12 NOTE — PROGRESS NOTES
Spoke with son and her voiced understanding of medication increase and will inform his mother of the changes.

## 2023-04-12 NOTE — TELEPHONE ENCOUNTER
----- Message from OSMAR Young sent at 4/12/2023  7:01 AM CDT -----  Patient requesting to be set up with continuous glucose monitor, please send in prescription for freestyle Leonard 2 and associated part.   Diagnosis: insulin-dependent type 2 diabetes    and   uncontrolled diabetes

## 2023-04-12 NOTE — TELEPHONE ENCOUNTER
Called Denise to inform her of med changes but no answer will return call at a later time in the day.

## 2023-04-12 NOTE — ASSESSMENT & PLAN NOTE
-BMI 35.91   -encourage low-calorie low carb diet   -encourage some form of routine aerobic exercise for weight loss   -encouraged 5-10 lb weight loss for next

## 2023-04-12 NOTE — TELEPHONE ENCOUNTER
----- Message from OSMAR Young sent at 4/11/2023  5:27 PM CDT -----  Please inform patient that I also increase her NovoLog 8 units 3 times a day with meals to 10 units 3 times a day with meals.  Will keep previously increased Levemir at 65 units nightly.

## 2023-04-12 NOTE — ASSESSMENT & PLAN NOTE
-patient feeling generally well today  -wellness labs completed with active COVID infection with several malaise, will repeat labs within next few days  -age-appropriate screenings up-to-date  -immunizations out of date, recommend to obtain missing vaccines-follow-up next  -encourage routine aerobic exercise 2 to 3 times a week  -increase fluid hydration

## 2023-04-13 NOTE — TELEPHONE ENCOUNTER
----- Message from Dalia Pino sent at 4/13/2023  9:45 AM CDT -----  Regarding: backache  Patient wants to talk to nurse regarding her backache.  She said she had visit here on Tuesday.call her at [phone]

## 2023-04-13 NOTE — TELEPHONE ENCOUNTER
Pt called and stated she would like us to send in mobic for back pain I informed pt that NP would like to make her an appt. To discuss back issues and further evaluate before sending out prescription pt. Stated she didn't feel she needed appt cause she just came in on Tuesday, but she didn't bring up back issues in visit so pt stated she'll just wait until  returns and discuss issues with him and she'll take tylenol until then and if problem persists or gets worse she'll go to urgent care

## 2023-04-24 NOTE — TELEPHONE ENCOUNTER
----- Message from Linh Rubin sent at 4/24/2023  1:08 PM CDT -----  Regarding: Returning call  Type:  Patient Returning Call    Who Called:Denise  Who Left Message for Patient:  Does the patient know what this is regarding?:no  Would the patient rather a call back or a response via [facility]? Call back  Best Call Back Number:[phone]  Additional Information:

## 2023-04-24 NOTE — PROGRESS NOTES
Please inform patient her kidney function improved slightly from previous.  She is still encouraged to increase her fluid hydration, avoid nephrotoxic drugs (NSAIDs such as ibuprofen, Motrin, etc.).  However can not stress enough the importance of managing her blood sugar to prevent her diabetes harming her kidneys.

## 2023-05-08 NOTE — TELEPHONE ENCOUNTER
----- Message from Dalia Pino sent at 5/8/2023  7:53 AM CDT -----  Regarding: cough, sore throat  Patient is having cough, low grade fever 99.2, 100.  Sore throat. Uses [address]. Call her at [phone]

## 2023-06-26 NOTE — TELEPHONE ENCOUNTER
----- Message from Dalia Pino sent at 6/26/2023  1:07 PM CDT -----  Regarding: needs to talk to nurse  Son Jose called and has questions regarding patient getting some help now that she is home.  She had a stroke and was at [facility]. Call him at [phone]

## 2023-06-26 NOTE — TELEPHONE ENCOUNTER
Weakness to left side after stroke, was supposed to be to inpatient therapy but was discharged to home, needs , home health services, therapy, transportation ect. Needs orders

## 2023-06-27 NOTE — PROGRESS NOTES
"C3 nurse spoke with Denise Oleary's , Nestor, for a TCC post hospital discharge follow up call. The patient has a scheduled HOSFU appointment with Yan Blanco MD (PCP) 7/6/23 @ 10:20am.     [facility] nurse came today, and patient's BP is still high and the patient is "kind of out of it". They have put a call in to Dr. Gaffney's office to possibly have the patient readmitted to control BP. During call with , the nurse was calling him back. He asked to call him back shortly so he could answer her call.     Medications taking that are not listed in chart are atorvastatin 80mg once daily, benzonatate 200mg PRN, plavix 75mg once daily, and  Percocet 10-325mg  Q4hr PRN pain.   " "Shiloh Cheatham presents for hockey ppe  History of right shoulder subluxation, not bothering her  History of right knee patellofemoral contusion, not bothering her  Vitals: /62  Pulse 64  Ht 1.727 m (5' 8\")  Wt 67.1 kg (148 lb)  BMI 22.5 kg/m2  BMI= Body mass index is 22.5 kg/(m^2).  Sport(s): Ice Hockey    Vision: Right Eye: 20/20 Left Eye: 20/20 Both Eyes: 20/20    Pupils: equal  Sickle Cell Trait: Discussed and Patient refused Sickle Cell Trait testing  Concussions: Concussion fact sheet reviewed. Student Athlete gave written and verbal agreement to report any suspected concussions.    General/Medical  Eyes/Vision: Normal  Ears/Hearing: Normal  Nose: Normal  Mouth/Dental: Normal  Throat: Normal  Thyroid: Normal  Lymph Nodes: Normal  Lungs: Normal  Abdomen: Normal    Skin: Normal    Musculoskeletal/Orthopaedic  Neck/Cervical: Normal  Thoracic/Lumbar: Normal  Shoulder/Upper Arm: Normal  Elbow/Forearm: Normal  Wrist/Hand/Fingers: Normal  Hip/Thigh: Normal  Knee/Patella: Normal  Lower Leg/Ankles: Normal  Foot/Toes: Normal    Cardiovascular Screening  RrR, no murmurs  Heart Murmur:No Grade: NA  Symmetric Femoral pulses: Yes    Stigmata of Marfan's Syndrome - if appropriate:  Not applicable    COMMENTS, RECOMMENDATIONS and PARTICIPATION STATUS  Cleared  Discussed healthy habits   RTC if issues with knee or shoulder  States that not taking any supplements at this time.  Dr Ochoa    "

## 2023-06-27 NOTE — PROGRESS NOTES
Called patient's  back, and he informed me that Dr. Gaffney is not willing to readmit the patient at this time, but is initiating SNF placement. Patient's , Nestor, stated that they mentioned SNF/Rehab only once during hospitalization, and was not done upon discharge. Patient's family is concerned about being able to care for her and having the proper DME to do so. Patient's BP is still high and that is concerning as well. They are unable to get the patient in the vehicle to get to appointments, and PCP notes to have referred the patient to [facility] to assist with these needs. Per , they have not heard from them. Number provided to him so that he can call and inquire about the status. Informed him that I would send this to Dr. Blanco's office to update of the patient status and concerns.

## 2023-06-28 NOTE — TELEPHONE ENCOUNTER
----- Message from Dalia Pino sent at 6/28/2023  8:05 AM CDT -----  Regarding: call about meds  Call Nestor friedman at [phone]

## 2023-06-28 NOTE — TELEPHONE ENCOUNTER
----- Message from Dalia Pino sent at 6/27/2023  8:36 AM CDT -----  Regarding: serafin Lucas called from serafin [facility]ant[facility] asking when pt had appt and it is on 7/6. She is talking to her supervisor to see if they can see her before office visit and will let us know

## 2023-06-28 NOTE — TELEPHONE ENCOUNTER
called wants to know when he is suppose to give the patient her injections on Levemir, her sugars this morning 246. Please advise ASAP

## 2023-06-28 NOTE — TELEPHONE ENCOUNTER
Spoke with pt , he wanted to know pt updated med list that we have in our system, Ilisted all medications to him and he stated understanding.

## 2023-06-28 NOTE — TELEPHONE ENCOUNTER
Pt is currently at an [facility] facility I am currently filling out paperwork for placement on SNF unit .

## 2023-06-30 NOTE — TELEPHONE ENCOUNTER
----- Message from [facility] sent at 6/30/2023 11:03 AM CDT -----  .Type:  Needs Medical Advice    Who Called:  [facility] ( [facility])      Would the patient rather a call back? Yes    Best Call Back Number:  [phone]    Additional Information:  they are waiting on paperwork that was sent to [facility]

## 2023-07-01 NOTE — NURSING
Nurses Note -- 4 Eyes      7/1/2023   7:05 AM      Skin assessed during: Admit      [] No Altered Skin Integrity Present    []Prevention Measures Documented      [x] Yes- Altered Skin Integrity Present or Discovered   [] LDA Added if Not in Epic (Describe Wound)   [] New Altered Skin Integrity was Present on Admit and Documented in LDA   [x] Wound Image Taken    Wound Care Consulted? Yes    Attending Nurse:  Ellen Alvarenga LPN     Second RN/Staff Member:  Ash Melissa

## 2023-07-01 NOTE — ED PROVIDER NOTES
Encounter Date: 6/30/2023       History     Chief Complaint   Patient presents with    Nausea     EMS states pt having nausea since returning home from [facility] on Sunday. Pt had CVA 1 week ago with left sided deficits.      83 y.o. female with a history of anxiety, depression, HLD, HTN, CKD stage 3, presents to the ED with nausea and vomiting onset Monday. Per family, patient had CVA last Wednesday  with memory impairment and left sided weakness and was in the hospital at [facility] until discharge home on Sunday. Notes that anytime she eats or drinks anything she throws up. Says it is also hard to give her her medications due to difficulty swallowing. Denies abdominal pain, chest pain, SOB, fever, chills, headache    The history is provided by the patient, the spouse and a relative. No  was used.   Review of patient's allergies indicates:  No Known Allergies  Past Medical History:   Diagnosis Date    Altered mental status     Anxiety and depression     Class 2 severe obesity due to excess calories with serious comorbidity and body mass index (BMI) of 35.0 to 35.9 in adult 4/12/2023    Gastroesophageal reflux disease without esophagitis 10/4/2022    Hypertension     Mixed hyperlipidemia     Primary insomnia     Restless leg syndrome     Stage 3b chronic kidney disease 10/4/2022     Past Surgical History:   Procedure Laterality Date    Excision of Pancreas      Excision of Stomach      Gastrointestinal Biopsy      HEMORRHOID SURGERY      TOTAL KNEE ARTHROPLASTY      TUBAL LIGATION       No family history on file.  Social History     Tobacco Use    Smoking status: Never    Smokeless tobacco: Never   Substance Use Topics    Alcohol use: Not Currently     Review of Systems   Constitutional:  Negative for chills and fever.   Eyes:  Negative for visual disturbance.   Respiratory:  Negative for cough and shortness of breath.    Cardiovascular:  Negative for chest pain.   Gastrointestinal:  Positive for nausea  and vomiting. Negative for abdominal pain.   Genitourinary:  Negative for dysuria.   Musculoskeletal:  Negative for arthralgias.   Skin:  Negative for color change and rash.   Neurological:  Positive for weakness. Negative for dizziness and headaches.   Psychiatric/Behavioral:  Negative for behavioral problems.    All other systems reviewed and are negative.    Physical Exam     Initial Vitals [06/30/23 2035]   BP Pulse Resp Temp SpO2   (!) 121/58 83 16 98.4 °F (36.9 °C) 95 %      MAP       --         Physical Exam    Nursing note and vitals reviewed.  Constitutional: She appears well-developed and well-nourished.   HENT:   Head: Normocephalic and atraumatic.   Eyes: EOM are normal. Pupils are equal, round, and reactive to light.   Neck: Neck supple.   Cardiovascular:  Normal rate, regular rhythm and normal heart sounds.           Pulmonary/Chest: Breath sounds normal.   Abdominal: Abdomen is soft. Bowel sounds are normal. She exhibits no distension. There is no abdominal tenderness. There is no rebound.   Musculoskeletal:         General: Normal range of motion.      Cervical back: Neck supple.     Neurological: She is alert and oriented to person, place, and time. She has normal strength. GCS score is 15. GCS eye subscore is 4. GCS verbal subscore is 5. GCS motor subscore is 6.   3/5 strength to LUE and LLE, 5/5 strength to RUE/RLE   Skin: Skin is warm and dry.   Psychiatric: She has a normal mood and affect.       ED Course   Procedures  Labs Reviewed   COMPREHENSIVE METABOLIC PANEL - Abnormal; Notable for the following components:       Result Value    Carbon Dioxide 22 (*)     Glucose Level 47 (*)     Creatinine 1.82 (*)     Albumin Level 3.1 (*)     Globulin 3.9 (*)     Albumin/Globulin Ratio 0.8 (*)     All other components within normal limits   URINALYSIS, REFLEX TO URINE CULTURE - Abnormal; Notable for the following components:    Protein, UA 2+ (*)     Ketones, UA Trace (*)     Bilirubin, UA 1+ (*)      All other components within normal limits   CBC WITH DIFFERENTIAL - Abnormal; Notable for the following components:    WBC 15.93 (*)     RBC 3.87 (*)     Hgb 10.6 (*)     Hct 33.2 (*)     MCHC 31.9 (*)     Platelet 589 (*)     Neut # 10.60 (*)     Mono # 1.58 (*)     IG# 0.09 (*)     All other components within normal limits   POCT GLUCOSE - Abnormal; Notable for the following components:    POCT Glucose 232 (*)     All other components within normal limits   POCT GLUCOSE - Abnormal; Notable for the following components:    POCT Glucose 162 (*)     All other components within normal limits   URINALYSIS, MICROSCOPIC - Normal   CBC W/ AUTO DIFFERENTIAL    Narrative:     The following orders were created for panel order CBC auto differential.  Procedure                               Abnormality         Status                     ---------                               -----------         ------                     CBC with Differential[[id number]]        Abnormal            Final result                 Please view results for these tests on the individual orders.          Imaging Results              X-Ray Chest AP Portable (Final result)  Result time 06/30/23 23:02:02      Final result by Zain Duarte MD (06/30/23 23:02:02)                   Impression:      No acute abnormality.      Electronically signed by: Zain Duarte MD  Date:    06/30/2023  Time:    23:02               Narrative:    EXAMINATION:  XR CHEST AP PORTABLE    CLINICAL HISTORY:  Vomiting, unspecified    TECHNIQUE:  Single frontal view of the chest was performed.    COMPARISON:  02/21/2022    FINDINGS:  The lungs are clear, with normal appearance of pulmonary vasculature and no pleural effusion or pneumothorax.    The cardiac silhouette is normal in size. The hilar and mediastinal contours are unremarkable.    Bones are intact.                                       Medications   dextrose 5 % and 0.45 % NaCl infusion (0 mLs Intravenous Hold 6/30/23 2979)    dextrose 10% bolus 250 mL 250 mL (0 mLs Intravenous Stopped 6/30/23 2255)   promethazine injection 25 mg (25 mg Intramuscular Given 6/30/23 2226)   sodium chloride 0.9% bolus 1,000 mL 1,000 mL (1,000 mLs Intravenous New Bag 6/30/23 3556)     Medical Decision Making:   Initial Assessment:   83 y.o. female with a history of HLD, HTN, CKD stage 3, presents to the ED with nausea and vomiting onset Monday. Per family, patient had CVA last Wednesday  with memory impairment and left sided weakness and was in the hospital at [facility] until discharge home on Sunday. Notes that anytime she eats or drinks anything she throws up. Says it is also hard to give her her medications due to difficulty swallowing. Denies abdominal pain, chest pain, SOB, fever, chills, headache  Differential Diagnosis:   Dysphagia, gastritis, VERNON, dehydration, UTI, electrolyte abnormality   Clinical Tests:   Lab Tests: Reviewed and Ordered  Radiological Study: Reviewed and Ordered  ED Management:  Patient presenting to the ED with nausea and vomiting since Monday.  Patient had CVA over a week ago, was discharged home on Sunday.  Notable increase in kidney function from 5 days ago likely due to poor appetite with nausea and vomiting.  Initial glucose also noted to be extremely low.  Given the 10 bolus which improved glucose.  Per , the patient is extremely weak on the left side, and can not do for herself.  Believes she would also need and benefit broke so rehab.  Discussed the case with Dr. Ruiz, ED physician, who agrees with admission.  Discussed that I do believe she needs to stay in the hospital for IV fluid hydration as well as repeat swallow screen.  Will admit to hospital medicine.  Other:   I have discussed this case with another health care provider.       <> Summary of the Discussion: I spoke with Dr. Ruiz regarding the care of this patient. He personally saw the patient face-to-face and agrees with the plan moving forward.               "  ED Course as of 07/01/23 0137 Fri Jun 30, 2023   2325 Nausea a little better after phenergan [MA]      ED Course User Index  [MA] Nawaf Landeros PA-C          BP (!) 156/87   Pulse 79   Temp 98.4 °F (36.9 °C) (Oral)   Resp 19   Ht 5' 2" (1.575 m)   Wt 81.6 kg (180 lb)   SpO2 98%   BMI 32.92 kg/m²          Clinical Impression:   Final diagnoses:  [R11.10] Vomiting  [N17.9] VERNON (acute kidney injury) (Primary)        ED Disposition Condition    Admit Stable                Nawaf Landeros PA-C  07/01/23 0138    "

## 2023-07-01 NOTE — PT/OT/SLP EVAL
Speech Language Pathology Department  Clinical Swallow Evaluation    Patient Name:  Denise Oleary   MRN:  [id number]    Recommendations:     General recommendations:  Modified Barium Swallow Study  Diet recommendations:  Soft & Bite Sized Diet - IDDSI Level 6, Liquid Diet Level: Thin liquids - IDDSI Level 0   Swallow strategies/precautions:   Precautions: Standard,      History:     Denise Oleary is a/n 83 y.o. female admitted for vomiting with recent hospitalization at [facility] for CVA.  Per nursing, she reported having difficulty swallowing. Per chart, she was discharged from [facility] last month on a soft/bite-sized diet with thin liquids.  Pt and spouse report she has been regurgitating her foods, and that is has become more frequent in the past 2 weeks.  Her GI doctor is Dr. Gillespie.  Pt/spouse could not specify the reason she has a GI doctor, except she had 'stomach issues' in the past.    Past Medical History:   Diagnosis Date    Altered mental status     Anxiety and depression     Class 2 severe obesity due to excess calories with serious comorbidity and body mass index (BMI) of 35.0 to 35.9 in adult 4/12/2023    Gastroesophageal reflux disease without esophagitis 10/4/2022    Hypertension     Mixed hyperlipidemia     Primary insomnia     Restless leg syndrome     Stage 3b chronic kidney disease 10/4/2022     Past Surgical History:   Procedure Laterality Date    Excision of Pancreas      Excision of Stomach      Gastrointestinal Biopsy      HEMORRHOID SURGERY      TOTAL KNEE ARTHROPLASTY      TUBAL LIGATION           Home Diet: Soft & Bite-sized and thin liquids  Current Method of Nutrition: NPO    Patient complaint: hungry    Imaging   CXR 6/30 reported no acute process.    Subjective     Patient awake, alert, and cooperative.    Patient goals: stop vomiting     Spiritual/Cultural/Mandaen Beliefs/Practices that affect care: no  Pain/Comfort: Pain Rating 1: 0/10        Objective:     ORAL  MUSCULATURE  Dentition: missing teeth  Secretion Management: adequate  Mucosal Quality: dry  Facial Movement: WFL  Buccal Strength & Mobility: WFL  Mandibular Strength & Mobility: WFL  Oral Labial Strength & Mobility:  impaired ROM  Lingual Strength & Mobility: impaired strength  Vocal Quality: adequate      Consistency Fed By Oral Symptoms Pharyngeal Symptoms   Ice chips Self None Throat clear after swallow   Thin liquid by cup Self None None   Puree Self None None   Chewable solid SLP None None   Thin liquid by straw Self None None     Assessment:     Throat clear after the swallow with ice chips; otherwise no overt s/sx of aspiration with PO trials.  Belching after few trials.  Oral care performed by pt before trials commenced.    Given c/o of diff swallowing, repeated hospitalizations for recent CVA, and throat clearing, an instrumental swallow assessment is indicated.    Goals:     Multidisciplinary Problems       SLP Goals          Problem: SLP    Goal Priority Disciplines Outcome   SLP Goal     SLP    Description: LTG:  Pt will tolerate least restrictive diet with no clinical s/sx of aspiration.                     Patient Education:     Patient and spouse provided with verbal education regarding swallow function.  Understanding was verbalized.    Plan:     Soft/bite-sized diet  Thin liquids  Meds per pt preference  [facility] for c/o of diff swallowing     Time Tracking:     SLP Treatment Date:   07/01/23  Speech Start Time:  0935  Speech Stop Time:  0950     Speech Total Time (min):  15 min    Billable minutes:  Swallow and Oral Function Evaluation, 15 minutes     07/01/2023

## 2023-07-01 NOTE — H&P
[facility]  Hospital Medicine History & Physical Examination       Patient Name: Denise Oleary  MRN: [id number]  Patient Class: IP- Inpatient   Admission Date: 6/30/2023   Admitting Physician: Jayson Escobar MD  Length of Stay: 0  Attending Physician: Andreea Cole MD   Primary Care Provider: Yan Ferro MD  Face-to-Face encounter date: 07/01/2023  Code Status: Full code   Chief Complaint: Nausea (EMS states pt having nausea since returning home from [facility] on Sunday. Pt had CVA 1 week ago with left sided deficits. )        Patient information was obtained from patient, patient's family, past medical records and ER records.     HISTORY OF PRESENT ILLNESS:   Denise Oleary is a 83 y.o. female with a past medical history of essential hypertension, hyperlipidemia, CKD stage IIIB, recent CVA with left-sided deficits, diabetes mellitus type 2, GERD, anxiety, and depression presented to [facility] on 6/30/2023 for nausea and vomiting.  Patient reports nausea and vomiting began 5 days ago.  Patient also endorsed difficulty swallowing.  Family reported patient was recently diagnosed with CVA on 7/21/2023 and was inpatient at [facility] with discharge on 6/25/2023.   Initial vital signs in ED were /58, pulse 83, respirations 16, temperature 36.9° C, and SpO2 95% on room air.  Labs revealed WBC 15.93, RBC 3.87, hemoglobin 10.6, hematocrit 33.2, platelets 589, CO2 22, BUN 20.1, creatinine 1.82, and glucose 47.  UA revealed 2+ protein and trace ketones.  Chest x-ray revealed no acute abnormality.  Patient was given D10 bolus in ED with improvement of glucose.  Patient was given IM Phenergan with some improvement of nausea.  Patient was admitted to hospital medicine service for further medical management.     PAST MEDICAL HISTORY:   Essential hypertension  Hyperlipidemia  CKD stage IIIB   Recent CVA with left-sided deficits  Diabetes mellitus type 2   GERD  Anxiety  Depression    PAST SURGICAL  HISTORY:     Past Surgical History:   Procedure Laterality Date    Excision of Pancreas      Excision of Stomach      Gastrointestinal Biopsy      HEMORRHOID SURGERY      TOTAL KNEE ARTHROPLASTY      TUBAL LIGATION         ALLERGIES:   Patient has no known allergies.    FAMILY HISTORY:   Reviewed and negative    SOCIAL HISTORY:   Denies tobacco, drug, and alcohol use    HOME MEDICATIONS:     Prior to Admission medications    Medication Sig Start Date End Date Taking? Authorizing Provider   ADULT LOW DOSE ASPIRIN 81 mg EC tablet  2/7/23  Yes Historical Provider   ALPRAZolam (XANAX) 0.25 MG tablet Take 1 tablet (0.25 mg total) by mouth daily as needed for Anxiety. 4/17/23  Yes Yan Blanco MD   atorvastatin (LIPITOR) 80 MG tablet Take 80 mg by mouth every evening.   Yes Historical Provider   benzonatate (TESSALON) 200 MG capsule Take 200 mg by mouth 3 (three) times daily as needed for Cough.   Yes Historical Provider   clopidogreL (PLAVIX) 75 mg tablet Take 75 mg by mouth once daily.   Yes Historical Provider   FLUoxetine 60 mg Tab Take 60 mg by mouth once daily. 10/4/22 7/1/23 Yes OSMAR Young   gabapentin (NEURONTIN) 300 MG capsule Take 1 capsule (300 mg total) by mouth once daily. 4/11/23  Yes Yan Blanco MD   insulin aspart protamine-insulin aspart (NOVOLOG 70/30) 100 unit/mL (70-30) InPn pen Inject into the skin 3 (three) times daily before meals.   Yes Historical Provider   insulin detemir U-100 (LEVEMIR) 100 unit/mL injection Inject into the skin once daily.   Yes Historical Provider   rOPINIRole (REQUIP) 1 MG tablet Take 1 tablet (1 mg total) by mouth daily as needed. 5/26/23  Yes Yan Blanco MD   zolpidem (AMBIEN) 5 MG Tab Take 1 tablet (5 mg total) by mouth nightly as needed. 5/1/23  Yes Yan Blanco MD   amLODIPine (NORVASC) 5 MG tablet Take 5 mg by mouth once daily. 9/15/22   Historical Provider   azelastine (ASTELIN) 137 mcg (0.1 %) nasal spray 2 sprays once daily.  22   Historical Provider   blood-glucose meter,continuous (DEXCOM G6 ) Misc 1 each by Misc.(Non-Drug; Combo Route) route every 14 (fourteen) days. 23  Yan Blanco MD   blood-glucose sensor (DEXCOM G6 SENSOR) Cee 10 each by Misc.(Non-Drug; Combo Route) route every 14 (fourteen) days. 23  Yan Blanco MD   blood-glucose transmitter (DEXCOM G6 TRANSMITTER) Cee 1 each by Misc.(Non-Drug; Combo Route) route every 14 (fourteen) days. 23  Yan Blanco MD   calcium-vitamin D3 (OS-MARIAJOSE 500 + D3) 500 mg-5 mcg (200 unit) per tablet Take 1 tablet by mouth.    Historical Provider   CONTOUR NEXT EZ METER [facility] use as directed 3/24/22   Historical Provider   CONTOUR NEXT TEST STRIPS Strp 1 strip by Misc.(Non-Drug; Combo Route) route 4 (four) times daily. 1/3/23   Yan Blanco MD   diphenoxylate-atropine 2.5-0.025 mg (LOMOTIL) 2.5-0.025 mg per tablet Take 1 tablet by mouth every 6 (six) hours. 21   Historical Provider   doxycycline monohydrate 100 mg Tab SMARTSI Tablet(s) By Mouth Morning-Night 3/24/23   Historical Provider   famotidine (PEPCID) 40 MG tablet Take 40 mg by mouth nightly. 22   Historical Provider   flash glucose scanning reader (FREESTYLE ANNE 2 READER) Misc 1 each by Misc.(Non-Drug; Combo Route) route once daily. 23   Yan Blanco MD   flash glucose sensor (FREESTYLE ANNE 2 SENSOR) Kit 1 each by Misc.(Non-Drug; Combo Route) route every 14 (fourteen) days. 23   Yan Blanco MD   fluticasone propionate (FLONASE) 50 mcg/actuation nasal spray 2 sprays by Each Nostril route once daily. 21   Historical Provider   hydrALAZINE (APRESOLINE) 25 MG tablet taek ONE TABLET BY MOUTH TWICE DAILY 9/15/22   Historical Provider   hydrOXYzine HCL (ATARAX) 25 MG tablet Take 1 tablet (25 mg total) by mouth daily as needed.  Patient not taking: Reported on 2023   MD AIDE Gutierrez  100 unit/mL (3 mL) InPn pen inject 75 UNITS subcutaneously DAILY 4/14/23   Yan Blanco MD   meloxicam (MOBIC) 7.5 MG tablet Take 1 tablet (7.5 mg total) by mouth once daily.  Patient not taking: Reported on 6/27/2023 4/14/23   Yan Blanco MD   metFORMIN (GLUCOPHAGE) 500 MG tablet TAKE TWO TABLETS BY MOUTH EVERY MORNING AND TWO TABLETS BY MOUTH AT BEDTIME 4/25/23   Yan Blanco MD   metoprolol succinate (TOPROL-XL) 50 MG 24 hr tablet TAKE ONE TABLET BY MOUTH TWICE DAILY 3/3/23   Yan Blanco MD   MICROLET LANCET Misc CHECK BLOOD SUGAR TWICE DAILY 3/24/22   Historical Provider   NOVOLOG FLEXPEN U-100 INSULIN 100 unit/mL (3 mL) InPn pen Inject 10 Units into the skin 3 (three) times daily with meals. 4/11/23   OMSAR Young   nystatin (MYCOSTATIN) cream Apply topically 2 (two) times daily. 1/14/22   Historical Provider   olmesartan-hydrochlorothiazide (BENICAR HCT) 40-25 mg per tablet Take 1 tablet by mouth every morning. 4/25/22   Historical Provider   pantoprazole (PROTONIX) 40 MG tablet Take 40 mg by mouth once daily. 5/23/22   Historical Provider   promethazine (PHENERGAN) 25 MG tablet Take 1-2 tablets (25-50 mg total) by mouth every 6 (six) hours as needed for Nausea.  Patient not taking: Reported on 6/27/2023 4/11/23   Yan Blanco MD   simvastatin (ZOCOR) 10 MG tablet TAKE ONE TABLET BY MOUTH IN THE EVENING  Patient not taking: Reported on 6/27/2023 6/23/23   Yan Blanco MD   sucralfate (CARAFATE) 1 gram tablet Take 1 g by mouth 4 (four) times daily. 12/23/21   Historical Provider   triamcinolone acetonide 0.1% (KENALOG) 0.1 % cream Apply topically nightly. 1/14/22   Historical Provider       REVIEW OF SYSTEMS:   Except as documented, all other systems reviewed and negative     PHYSICAL EXAM:     VITAL SIGNS: 24 HRS MIN & MAX LAST   Temp  Min: 98.4 °F (36.9 °C)  Max: 98.9 °F (37.2 °C) 98.9 °F (37.2 °C)   BP  Min: 121/58  Max: 199/75 (!) 187/76   Pulse  Min: 78   Max: 96  78   Resp  Min: 15  Max: 20 18   SpO2  Min: 94 %  Max: 99 % 97 %         LABS AND IMAGING:     Recent Labs   Lab 06/30/23 2112 07/01/23  0611   WBC 15.93* 14.95*   RBC 3.87* 3.50*   HGB 10.6* 9.5*   HCT 33.2* 30.6*   MCV 85.8 87.4   MCH 27.4 27.1   MCHC 31.9* 31.0*   RDW 14.9 15.0   * 578*   MPV 9.8 10.7*       Recent Labs   Lab 06/25/23  0358 06/30/23 2112 07/01/23  0611   * 136 137   K 4.2 3.7 4.6   CL 99*  --   --    CO2 22 22* 22*   ANIONGAP 12  --   --    BUN 10 20.1 18.4   CREATININE 1.16 1.82* 1.68*   CALCIUM 8.0* 9.2 8.4   ALBUMIN  --  3.1* 3.1*   ALKPHOS  --  68 66   ALT  --  6 5   AST  --  14 14   BILITOT  --  0.4 0.5       Microbiology Results (last 7 days)       ** No results found for the last 168 hours. **             X-Ray Chest AP Portable  Narrative: EXAMINATION:  XR CHEST AP PORTABLE    CLINICAL HISTORY:  Vomiting, unspecified    TECHNIQUE:  Single frontal view of the chest was performed.    COMPARISON:  02/21/2022    FINDINGS:  The lungs are clear, with normal appearance of pulmonary vasculature and no pleural effusion or pneumothorax.    The cardiac silhouette is normal in size. The hilar and mediastinal contours are unremarkable.    Bones are intact.  Impression: No acute abnormality.    Electronically signed by: Zain Duarte MD  Date:    06/30/2023  Time:    23:02        ASSESSMENT & PLAN:   Assessment:  Acute nausea and vomiting   Dysphagia  VERNON on CKD   History of essential hypertension, hyperlipidemia, CKD stage IIIB, recent CVA with left-sided deficits, diabetes mellitus type 2, GERD, anxiety, and depression    Plan:  IVF   PRN antiemetics  SLP consulted, appreciate recommendations  PT/OT  Insulin sliding scale with Accu-checks  Continue appropriate home medications once med rec updated   Labs in a.m.  Further recommendations attending    VTE Prophylaxis: SCDs    __________________________________________________________________________  INPATIENT LIST OF MEDICATIONS      Scheduled Meds:   dextrose 5 % and 0.45 % NaCl  1,000 mL Intravenous ED 1 Time    diphenhydrAMINE        enoxparin  40 mg Subcutaneous Daily     Continuous Infusions:  PRN Meds:.acetaminophen, acetaminophen, insulin aspart U-100, naloxone, ondansetron, prochlorperazine, sodium chloride 0.9%      Nawaf RUGGIERO PA-C, have reviewed and discussed the case with Dr. Andreea Cole MD   Please see the following addendum for further assessment and plan from there attending MD.    07/01/2023    ________________________________________________________________________________    MD Addendum:  I, dr mer adair assumed care of this patient today   For the patient encounter, I performed the substantive portion of the visit, I reviewed the PA documentation, treatment plan, and medical decision making.  I had face to face time with this patient     A. History:  Chief Complaint: Nausea (EMS states pt having nausea since returning home from [facility] on Sunday. Pt had CVA 1 week ago with left sided deficits. )         Patient information was obtained from patient, patient's family, past medical records and ER records.      HISTORY OF PRESENT ILLNESS:   83 y.o. female with a past medical history of essential hypertension, hyperlipidemia, CKD stage IIIB, recent CVA with left-sided deficits, diabetes mellitus type 2, GERD, anxiety, and depression presented to [facility] on 6/30/2023 for nausea and vomiting.      C/0 - nausea, vomiting, dysphagia for 2 weeks now   ------recently diagnosed with CVA on 7/21/2023 and was inpatient at [facility] with discharge on 6/25/2023.     Patient was admitted to hospital medicine service for further medical management.       vital signs in ED   /58, pulse 83, respirations 16, temperature 36.9° C, and SpO2 95% on room air.      Labs   WBC 15.93, RBC 3.87, hemoglobin 10.6, hematocrit 33.2, platelets 589, CO2 22, BUN 20.1, creatinine 1.82, and glucose 47.    UA revealed 2+ protein and trace ketones.       Imaging  Chest x-ray revealed no acute abnormality.        Assessment:  Acute Gastritis- nausea and vomiting   Dysphagia/Odynophagia   Aspiration PNA: Low grade fever , leucocytosis- at risk of aspiration  VERNON on CKD   Anemia   Essential HTN, uncontrolled   DM with Hyperglycemia      History of essential hypertension, hyperlipidemia, CKD stage IIIB, recent CVA with left-sided deficits, diabetes mellitus type 2, GERD, anxiety, and depression     Plan:  Admit patient   Cardiac monitoring   IV antiemetics as needed   Appreciate speech evaluation patient failed MBS recommends to consult GI for further testing  GI consulted for dysphagia odynophagia   Begin IV PPI 40 mg b.i.d. for gastritis,   Patient noted to have low-grade fever with leukocytosis, with recent vomiting , at risk of aspiration   Begin IV Unasyn 3 g q.12, day 1 of 5 , check resp panel  Begin IV Clinimix for nutrition and for slight acute kidney injury on CKD   Trend BMP, avoid nephrotoxic  Send for anemia workup   BP control- add clonidine patch 0.2 mg/24 hrs   Insulin sliding scale with Accu-checks  PT/OT  Continue appropriate home medications once med rec updated   Labs in a.m.       VTE Prophylaxis: SCDs     Discharge Planning and Disposition: No mobility needs. Ambulating well. Good social support system.   Anticipated discharge    All diagnosis and differential diagnosis have been reviewed; assessment and plan has been documented; I have personally reviewed the labs and test results that are presently available; I have reviewed the patients medication list; I have reviewed the consulting providers response and recommendations. I have reviewed or attempted to review medical records based upon their availability.    All of the patient and family questions have been addressed and answered. Patient's is agreeable to the above stated plan. I will continue to monitor closely and make adjustments to medical management as needed.      07/01/2023

## 2023-07-01 NOTE — PROCEDURES
Speech Language Pathology Department  Modified Barium Swallow (MBS) Study    Patient Name:  Denise Oleary   MRN:  [id number]    Recommendations:     General recommendations:  GI consult  Repeat MBS study: not needed  Diet recommendations:  Soft & Bite Sized Diet - IDDSI Level 6, Liquid Diet Level: Thin liquids - IDDSI Level 0   Swallow strategies/precautions:  upright with intake, and for 45 min after intake  General precautions: Standard,      History/Reason for Referral:     Denise Oleary is a/n 83 y.o. female admitted for vomiting with recent hospitalization at [facility] for CVA.  Per nursing, she reported having difficulty swallowing. Per chart, she was discharged from [facility] last month on a soft/bite-sized diet with thin liquids.  Pt and spouse report she has been regurgitating her foods, and that is has become more frequent in the past 2 weeks.  Her GI doctor is Dr. Gillespie.  Pt/spouse could not specify the reason she has a GI doctor, except she had 'stomach issues' in the past.    After MBS was complete; pt reported sometimes just the smell of food makes her gag.    Past Medical History:   Diagnosis Date    Altered mental status     Anxiety and depression     Class 2 severe obesity due to excess calories with serious comorbidity and body mass index (BMI) of 35.0 to 35.9 in adult 4/12/2023    Gastroesophageal reflux disease without esophagitis 10/4/2022    Hypertension     Mixed hyperlipidemia     Primary insomnia     Restless leg syndrome     Stage 3b chronic kidney disease 10/4/2022     Past Surgical History:   Procedure Laterality Date    Excision of Pancreas      Excision of Stomach      Gastrointestinal Biopsy      HEMORRHOID SURGERY      TOTAL KNEE ARTHROPLASTY      TUBAL LIGATION       A MBS Study was completed to assess the efficiency of her swallow function, rule out aspiration and make recommendations regarding safe dietary consistencies, effective compensatory strategies, and safe eating environment,  and due to complaints of difficulty swallowing.      Home Diet: Soft & Bite-sized  Current Method of Nutrition: NPO    Patient complaint: vomiting    Imaging   CXR 6/30 - no acute process    Subjective:     Patient awake, alert, and cooperative.  Upon arrival for study, there was masticated sridhar cracker around her mouth.  She reported she regurgitated some of the sridhar cracker from the bedside study after SLP left.    Spiritual/Cultural/Taoism Beliefs/Practices that affect care: no  Pain/Comfort: Pain Rating 1: 0/10    Respiratory Status: nasal cannula      Radiologist:  technician    Fluoroscopic Results:     ORAL MUSCULATURE  Dentition: missing teeth  Secretion Management: adequate  Mucosal Quality: dry  Facial Movement: WFL  Buccal Strength & Mobility: WFL  Mandibular Strength & Mobility: WFL  Oral Labial Strength & Mobility:  impaired ROM  Lingual Strength & Mobility: impaired strength  Vocal Quality: adequate    Positioning: upright in bed  Visualization  Patient was seen in the lateral view    Oral Phase:   Adequate lip closure  Adequate anterior-posterior transport  Adequate mastication  Adequate bolus cohesion    Pharyngeal Phase:   Laryngeal penetration , flash with thin liquids by cup  Consistency Laryngeal Penetration Aspiration Residue   Mildly thick liquid by cup None None None   Thin liquid by cup Before the swallow  Flash penetration None None   Puree None None None   Chewable solid None None None       Cervical Esophageal Phase:   Possible CP bar    Additional Comments:  Pt gagged after initial trial, and belched after study was complete.  She was put under fluoroscopy after both of these events; there was no evidence of pharygeal residue or airway invasion.    Assessment:     Minimal oropharygneal dysphagia with flash laryngeal penetration before the swallow with thin liquids by cup, which is also typical of presbyphagia.  Cervical esophageal screening indicated possible CP bar.  Recommend  soft/bite-sized diet, thin liquids, meds per pt preference, no straws, upright for all intake and remain upright for 45 min after intake, recommend GI consult for nausea/vomiting.    Goals:     Multidisciplinary Problems       SLP Goals          Problem: SLP    Goal Priority Disciplines Outcome   SLP Goal     SLP    Description: LTG:  Pt will tolerate least restrictive diet with no clinical s/sx of aspiration.                     Patient Education:     Patient provided with verbal education regarding swallow function.  Understanding was verbalized.    Plan:     Soft/bite-sized diet  Thin liquids  No Straws  Meds per pt preference  Remain upright 45 min after intake  GI consult for nausea/vomiting    Time Tracking:     SLP Treatment Date:   07/01/23  Speech Start Time:  1015  Speech Stop Time:  1045     Speech Total Time (min):  30 min    Billable minutes:   Motion Fluoroscopic Evaluation, Video Recording, 30 minutes     07/01/2023

## 2023-07-02 NOTE — PROGRESS NOTES
[facility] Hospital Medicine Progress Note        Chief Complaint: Inpatient Follow-up for persistent nausea and vomiting     HPI:   Denise Oleary is a 83 y.o. female with a past medical history of essential hypertension, hyperlipidemia, CKD stage IIIB, recent CVA with left-sided deficits, diabetes mellitus type 2, GERD, anxiety, and depression presented to [facility] on 6/30/2023 for nausea and vomiting.  Patient reports nausea and vomiting began 5 days ago.  Patient also endorsed difficulty swallowing.  Family reported patient was recently diagnosed with CVA on 7/21/2023 and was inpatient at [facility] with discharge on 6/25/2023.   Initial vital signs in ED were /58, pulse 83, respirations 16, temperature 36.9° C, and SpO2 95% on room air.  Labs revealed WBC 15.93, RBC 3.87, hemoglobin 10.6, hematocrit 33.2, platelets 589, CO2 22, BUN 20.1, creatinine 1.82, and glucose 47.  UA revealed 2+ protein and trace ketones.  Chest x-ray revealed no acute abnormality.  Patient was given D10 bolus in ED with improvement of glucose.  Patient was given IM Phenergan with some improvement of nausea.  Patient was admitted to hospital medicine service for further medical management.        Interval Hx:   Nausea and vomiting improved since admission and tolerating soft and bite sized diet  however reports this has been going on  on and off for more than a year now . Pt had EUS done in 5/2020 showed diffuse dilation of main pancreatic duct up to 11mm with pancreatic atrophy, no evidence of pancreatic mass, area of pancreatitc duct transition in head of gland, FNA - negative for malignant cells. She is under surveillance with Dr. Ketan Shepard.    GI consult obtained and scheduled for EGD in am     Pt's  and nursing reported some hallucination last night , pt was seeing some kids playing in her room. Pt is able to recall her hallucination. Noted pt is on Ambien at night.     BP control is fair .  Will monitor trend   Labs- WBC down to 13.3K, Hgb 10, Plt 553, , Na 134, K 4.1, Cr 1.3>1.8    Case was discussed with patient's nurse and  on the floor.    Objective/physical exam:  General: In no acute distress, afebrile  Chest: Clear to auscultation bilaterally  Heart: RRR, +S1, S2, no appreciable murmur  Abdomen: Soft, nontender, BS +  MSK: Warm, no lower extremity edema, no clubbing or cyanosis, (+) bruise . Bandage noted Rt ankle where she had surgery 2 mos ago.   Neurologic: Currently awake, alert, oriented x 3, Cranial nerve II-XII intact except VII, Left tess paresis     VITAL SIGNS: 24 HRS MIN & MAX LAST   Temp  Min: 97.6 °F (36.4 °C)  Max: 98.3 °F (36.8 °C) 97.6 °F (36.4 °C)   BP  Min: 149/75  Max: 177/78 (!) 149/75   Pulse  Min: 83  Max: 94  94   Resp  Min: 18  Max: 20 18   SpO2  Min: 96 %  Max: 97 % 96 %     I have reviewed the following labs:    Recent Labs   Lab 06/30/23 2112 07/01/23 0611 07/02/23 0527   WBC 15.93* 14.95* 13.30*   RBC 3.87* 3.50* 3.70*   HGB 10.6* 9.5* 10.1*   HCT 33.2* 30.6* 32.3*   MCV 85.8 87.4 87.3   MCH 27.4 27.1 27.3   MCHC 31.9* 31.0* 31.3*   RDW 14.9 15.0 14.8   * 578* 553*   MPV 9.8 10.7* 10.1       Recent Labs   Lab 06/30/23 2112 07/01/23 0611 07/02/23  0527    137 134*   K 3.7 4.6 4.1   CO2 22* 22* 22*   BUN 20.1 18.4 15.6   CREATININE 1.82* 1.68* 1.38*   CALCIUM 9.2 8.4 8.3*   ALBUMIN 3.1* 3.1*  --    ALKPHOS 68 66  --    ALT 6 5  --    AST 14 14  --    BILITOT 0.4 0.5  --           Microbiology Results (last 7 days)       ** No results found for the last 168 hours. **             See below for Radiology    Scheduled Med:   amLODIPine  10 mg Oral Daily    ampicillin-sulbactim (UNASYN) IVPB  3 g Intravenous Q12H    atorvastatin  40 mg Oral QHS    B12-levomefolate calcium-B6  1 tablet Oral Daily    cloNIDine 0.2 mg/24 hr td ptwk  1 patch Transdermal Q7 Days    clopidogreL  75 mg Oral Daily    cyanocobalamin  1,000 mcg Intramuscular Daily     enoxparin  40 mg Subcutaneous Daily    gabapentin  100 mg Oral QHS    hydrALAZINE  50 mg Oral Q8H    insulin detemir U-100  20 Units Subcutaneous BID    megestroL  400 mg Oral Daily    metoprolol succinate  50 mg Oral Daily    pantoprazole  40 mg Oral Daily    QUEtiapine  25 mg Oral After dinner        Continuous Infusions:   lactated ringers          PRN Meds:  acetaminophen, acetaminophen, ALPRAZolam, butalbital-acetaminophen-caffeine -40 mg, diphenhydrAMINE, hydrALAZINE, HYDROcodone-acetaminophen, insulin aspart U-100, melatonin, naloxone, ondansetron, prochlorperazine, sodium chloride 0.9%       Assessment/Plan:  Intractable Nausea and Vomiting   Acute kidney injury on CKD III, pre renal due to IVVD  Metabolic acidosis , mild   Leukocytosis   Presumed aspiration pneumonitis in the setting of recurrent vomiting   Normochromic anemia   Thrombocytosis , reactive   Severe vitamin B12 deficiency   Diabetes Mellitus, type 2- uncontrolled due to hypo and hyperglycemia   Recent  Rt  MCA/ GERTRUDIS distribution stroke with left sided deficit - 6/21/23  Recent Rt ankle fracture sec to fall  s/p ORIF - 5/2023  Hallucination / Delirium     Hx- Essential HTN, HLD, CKD IIIB, GERD, anxiety/ Depression     Plan-    GI consult obtained and scheduled for EGD in am for further evaluation of recurrent N/V  Continue IVF resuscitation , monitor renal parameter, renal recovery and UOP  Continue antibiotic targeting Aspiration pneumonia x 3 days only   Monitor clinical course     Monitor blood cell counts   Replace B12  Replete electrolytes as indicated     Accu check AC/HS and cover with sliding scale insulin and add basal insulin     PT/OT/ SLP consult     Avoid hypnotics and sedative   D/C Ambien. Will try Seroquel   CT head with no acute intracranial process except evidence of recent stroke     Continue supportive treatment with anti emetics and others         VTE prophylaxis: SCDs     Patient condition:  Fair     Anticipated  discharge and Disposition:     TBD     All diagnosis and differential diagnosis have been reviewed; assessment and plan has been documented; I have personally reviewed the labs and test results that are presently available; I have reviewed the patients medication list; I have reviewed the consulting providers response and recommendations. I have reviewed or attempted to review medical records based upon their availability    All of the patient's questions have been  addressed and answered. Patient's is agreeable to the above stated plan. I will continue to monitor closely and make adjustments to medical management as needed.  _____________________________________________________________________    Nutrition Status:    Radiology:  I have personally reviewed the following imaging and agree with the radiologist.     CT Head Without Contrast  Narrative: EXAMINATION:  CT HEAD WITHOUT CONTRAST    CLINICAL HISTORY:  Stroke, follow up;Mental status change, unknown cause;    TECHNIQUE:  Low dose axial CT images obtained throughout the head without intravenous contrast. Sagittal and coronal reconstructions were performed.  An automated dose exposure technique was utilized this limits radiation does the patient.    COMPARISON:  06/03/2016    FINDINGS:  Intracranial compartment:    Ventricles and sulci are normal in size for age without evidence of hydrocephalus. No extra-axial blood or fluid collections.    The brain parenchyma appears normal. No parenchymal mass, hemorrhage, edema or major vascular distribution infarct.    Skull/extracranial contents (limited evaluation): No fracture. Mastoid air cells and paranasal sinuses are essentially clear.  Impression: No acute intracranial abnormality.  Findings consistent with infarct of the right MCA distribution as well as right GERTRUDIS distribution.  Changes of microangiopathy as well as age-appropriate volume loss.    Electronically signed by: Zain Duarte  MD  Date:    07/02/2023  Time:    14:10  X-Ray Ankle Complete Right  Narrative: EXAMINATION:  XR ANKLE COMPLETE 3 VIEW RIGHT    CLINICAL HISTORY:  Rt ankle fracture;    TECHNIQUE:  Three views    COMPARISON:  May 31, 2023    FINDINGS:  There has been ORIF of the fractured distal fibula with placement of plate and screws.  Position and alignment is satisfactory.  Calcaneal enthesophyte.  Impression: As above.    Electronically signed by: Mahendra Addison  Date:    07/02/2023  Time:    11:59  X-Ray Abdomen AP 1 View  Narrative: EXAMINATION:  XR ABDOMEN AP 1 VIEW    CLINICAL HISTORY:  vomiting;    TECHNIQUE:  One view    COMPARISON:  None available    FINDINGS:  The intestinal gas pattern is nonspecific and nonobstructive. No air fluid levels or pneumoperitoneum identified. No convincing organomegaly.  Impression: Nonspecific bowel gas pattern.    Electronically signed by: Mahendra Addison  Date:    07/02/2023  Time:    09:46      Jerome Tabor MD   07/02/2023

## 2023-07-02 NOTE — PT/OT/SLP DISCHARGE
Speech Language Pathology Department  Discharge Summary    Patient Name:  Denise Oleary   MRN:  [id number]  Pt tolerating diet without clinical signs/sx of aspiration. No skilled SLP intervention is warranted at this time.

## 2023-07-02 NOTE — PLAN OF CARE
Problem: Occupational Therapy  Goal: Occupational Therapy Goal  Description: Goals to be met by: 7/14/23     Patient will increase functional independence with ADLs by performing:    UE Dressing with Set-up Assistance.  LE Dressing with Moderate Assistance and Assistive Devices as needed.  Grooming while EOB with Set-up Assistance.  Toileting from bedside commode with Moderate Assistance for hygiene and clothing management.     Outcome: Ongoing, Progressing

## 2023-07-02 NOTE — PT/OT/SLP PROGRESS
Physical Therapy    Missed Treatment Session    Patient Name:  Denise Oleary   MRN:  [id number]      Patient not seen at this time secondary to Off the floor for CT scan.    Will follow-up as patient is available to participate and as therapists' schedule allows.

## 2023-07-02 NOTE — CONSULTS
Gastroenterology Consultation Note    Reason for Consult:  The primary encounter diagnosis was VERNON (acute kidney injury). Diagnoses of Vomiting and Chest pain were also pertinent to this visit.    PCP:   Yan Ferro MD    Referring MD:  Yan Ferro Md  [address] [place] [address]  MARIA ESTHER [place] [id number]    Hospital Day: 1     Initial History of Present Illness (HPI):  This is a 83 y.o. female, pt of Dr. Warren,  with a past medical history of essential hypertension, hyperlipidemia, CKD stage IIIB, recent CVA with left-sided deficits, diabetes mellitus type 2, GERD, anxiety, and depression presented to [facility] on 6/30/2023 for nausea and vomiting.  Patient reports nausea and vomiting began 5 days ago. However has had ongoing issues with N/V for over a year. This appears to wax and wane and is not attributed to any particular food. No true weight loss.  Patient also endorsed difficulty swallowing.  Family reported patient was recently diagnosed with CVA on 7/21/2023 and was inpatient at [facility] with discharge on 6/25/2023.   Patient was given D10 bolus in ED with improvement of glucose.  Patient was given IM Phenergan with some improvement of nausea. We are consulted for persistent N/V.     5/2020 EUS - sliding HH, H Pylori neg, diffuse dilation of main pancreatic duct up to 11mm with pancreatic atrophy, no evidence of pancreatic mass, area of pancreatitc duct transition in head of gland, FNA - negative for malignant cells. Remains under surveillance with Dr. Ketan Shepard    3-16-22 nuc med gastric empty - normal    Colon 4/2018 - toruous but otherwise normal  colon.         ROS:  12 point system reviewed and is negative except as noted in HPI     Medical History:   Past Medical History:   Diagnosis Date    Altered mental status     Anxiety and depression     Class 2 severe obesity due to excess calories with serious comorbidity and body mass index (BMI) of 35.0 to 35.9 in adult 4/12/2023    Gastroesophageal reflux  disease without esophagitis 10/4/2022    Hypertension     Mixed hyperlipidemia     Primary insomnia     Restless leg syndrome     Stage 3b chronic kidney disease 10/4/2022       Surgical History:   Past Surgical History:   Procedure Laterality Date    Excision of Pancreas      Excision of Stomach      Gastrointestinal Biopsy      HEMORRHOID SURGERY      TOTAL KNEE ARTHROPLASTY      TUBAL LIGATION         Family History:   No family history on file..     Social History:   Social History     Tobacco Use    Smoking status: Never    Smokeless tobacco: Never   Substance Use Topics    Alcohol use: Not Currently       Allergies:  Review of patient's allergies indicates:  No Known Allergies    Medications Prior to Admission   Medication Sig Dispense Refill Last Dose    ADULT LOW DOSE ASPIRIN 81 mg EC tablet        ALPRAZolam (XANAX) 0.25 MG tablet Take 1 tablet (0.25 mg total) by mouth daily as needed for Anxiety. 30 tablet 3     atorvastatin (LIPITOR) 80 MG tablet Take 80 mg by mouth every evening.       benzonatate (TESSALON) 200 MG capsule Take 200 mg by mouth 3 (three) times daily as needed for Cough.       clopidogreL (PLAVIX) 75 mg tablet Take 75 mg by mouth once daily.       FLUoxetine 60 mg Tab Take 60 mg by mouth once daily. 90 tablet 1     gabapentin (NEURONTIN) 300 MG capsule Take 1 capsule (300 mg total) by mouth once daily. 30 capsule 3     insulin aspart protamine-insulin aspart (NOVOLOG 70/30) 100 unit/mL (70-30) InPn pen Inject into the skin 3 (three) times daily before meals.       insulin detemir U-100 (LEVEMIR) 100 unit/mL injection Inject into the skin once daily.       rOPINIRole (REQUIP) 1 MG tablet Take 1 tablet (1 mg total) by mouth daily as needed. 90 tablet 1     zolpidem (AMBIEN) 5 MG Tab Take 1 tablet (5 mg total) by mouth nightly as needed. 30 tablet 5     amLODIPine (NORVASC) 5 MG tablet Take 5 mg by mouth once daily.       azelastine (ASTELIN) 137 mcg (0.1 %) nasal spray 2 sprays once daily.        blood-glucose meter,continuous (DEXCOM G6 ) Misc 1 each by Misc.(Non-Drug; Combo Route) route every 14 (fourteen) days. 1 each 11     blood-glucose sensor (DEXCOM G6 SENSOR) Cee 10 each by Misc.(Non-Drug; Combo Route) route every 14 (fourteen) days. 1 each 11     blood-glucose transmitter (DEXCOM G6 TRANSMITTER) Cee 1 each by Misc.(Non-Drug; Combo Route) route every 14 (fourteen) days. 1 each 11     calcium-vitamin D3 (OS-MARIAJOSE 500 + D3) 500 mg-5 mcg (200 unit) per tablet Take 1 tablet by mouth.       CONTOUR NEXT EZ METER Misc use as directed       CONTOUR NEXT TEST STRIPS Strp 1 strip by Misc.(Non-Drug; Combo Route) route 4 (four) times daily. 200 strip 6     diphenoxylate-atropine 2.5-0.025 mg (LOMOTIL) 2.5-0.025 mg per tablet Take 1 tablet by mouth every 6 (six) hours.       doxycycline monohydrate 100 mg Tab SMARTSI Tablet(s) By Mouth Morning-Night       famotidine (PEPCID) 40 MG tablet Take 40 mg by mouth nightly.       flash glucose scanning reader (FREESTYLE ANNE 2 READER) Misc 1 each by Misc.(Non-Drug; Combo Route) route once daily. 1 each 0     flash glucose sensor (FREESTYLE ANNE 2 SENSOR) Kit 1 each by Misc.(Non-Drug; Combo Route) route every 14 (fourteen) days. 4 kit 11     fluticasone propionate (FLONASE) 50 mcg/actuation nasal spray 2 sprays by Each Nostril route once daily.       hydrALAZINE (APRESOLINE) 25 MG tablet taek ONE TABLET BY MOUTH TWICE DAILY       hydrOXYzine HCL (ATARAX) 25 MG tablet Take 1 tablet (25 mg total) by mouth daily as needed. (Patient not taking: Reported on 2023) 30 tablet 5     LEVEMIR FLEXPEN 100 unit/mL (3 mL) InPn pen inject 75 UNITS subcutaneously DAILY 15 mL 3     meloxicam (MOBIC) 7.5 MG tablet Take 1 tablet (7.5 mg total) by mouth once daily. (Patient not taking: Reported on 2023) 14 tablet 0     metFORMIN (GLUCOPHAGE) 500 MG tablet TAKE TWO TABLETS BY MOUTH EVERY MORNING AND TWO TABLETS BY MOUTH AT BEDTIME 120 tablet 12     metoprolol  "succinate (TOPROL-XL) 50 MG 24 hr tablet TAKE ONE TABLET BY MOUTH TWICE DAILY 60 tablet 4     MICROLET LANCET Misc CHECK BLOOD SUGAR TWICE DAILY       NOVOLOG FLEXPEN U-100 INSULIN 100 unit/mL (3 mL) InPn pen Inject 10 Units into the skin 3 (three) times daily with meals. 3 each 3     nystatin (MYCOSTATIN) cream Apply topically 2 (two) times daily.       olmesartan-hydrochlorothiazide (BENICAR HCT) 40-25 mg per tablet Take 1 tablet by mouth every morning.       pantoprazole (PROTONIX) 40 MG tablet Take 40 mg by mouth once daily.       promethazine (PHENERGAN) 25 MG tablet Take 1-2 tablets (25-50 mg total) by mouth every 6 (six) hours as needed for Nausea. (Patient not taking: Reported on 6/27/2023) 30 tablet 3     simvastatin (ZOCOR) 10 MG tablet TAKE ONE TABLET BY MOUTH IN THE EVENING (Patient not taking: Reported on 6/27/2023) 90 tablet 1     sucralfate (CARAFATE) 1 gram tablet Take 1 g by mouth 4 (four) times daily.       triamcinolone acetonide 0.1% (KENALOG) 0.1 % cream Apply topically nightly.            Objective Findings:    Vital Signs:  BP (!) 150/74   Pulse 90   Temp 98.1 °F (36.7 °C) (Oral)   Resp 18   Ht 5' 2" (1.575 m)   Wt 81.6 kg (180 lb)   SpO2 97%   BMI 32.92 kg/m²   Body mass index is 32.92 kg/m².    Physical Exam:  Nursing note and vitals reviewed.  Constitutional: She appears well-developed and well-nourished.   HENT:   Head: Normocephalic and atraumatic.   Eyes: EOM are normal. Pupils are equal, round, and reactive to light.   Neck: Neck supple.   Cardiovascular:  Normal rate, regular rhythm and normal heart sounds.           Pulmonary/Chest: Breath sounds normal.   Abdominal: Abdomen is soft. Bowel sounds are normal. She exhibits no distension. There is no abdominal tenderness. There is no rebound.   Musculoskeletal:         General: Normal range of motion.      Cervical back: Neck supple.      Neurological: She is alert and oriented to person, place, and time. She has normal strength. " 3/5 strength to LUE and LLE, 5/5 strength to RUE/RLE   Skin: Skin is warm and dry.   Psychiatric: She has a normal mood and affect.     Labs:  Recent Results (from the past 48 hour(s))   Comprehensive metabolic panel    Collection Time: 06/30/23  9:12 PM   Result Value Ref Range    Sodium Level 136 136 - 145 mmol/L    Potassium Level 3.7 3.5 - 5.1 mmol/L    Chloride 102 98 - 107 mmol/L    Carbon Dioxide 22 (L) 23 - 31 mmol/L    Glucose Level 47 (LL) 82 - 115 mg/dL    Blood Urea Nitrogen 20.1 9.8 - 20.1 mg/dL    Creatinine 1.82 (H) 0.55 - 1.02 mg/dL    Calcium Level Total 9.2 8.4 - 10.2 mg/dL    Protein Total 7.0 5.8 - 7.6 gm/dL    Albumin Level 3.1 (L) 3.4 - 4.8 g/dL    Globulin 3.9 (H) 2.4 - 3.5 gm/dL    Albumin/Globulin Ratio 0.8 (L) 1.1 - 2.0 ratio    Bilirubin Total 0.4 <=1.5 mg/dL    Alkaline Phosphatase 68 40 - 150 unit/L    Alanine Aminotransferase 6 0 - 55 unit/L    Aspartate Aminotransferase 14 5 - 34 unit/L    eGFR 28 mls/min/1.73/m2   CBC with Differential    Collection Time: 06/30/23  9:12 PM   Result Value Ref Range    WBC 15.93 (H) 4.50 - 11.50 x10(3)/mcL    RBC 3.87 (L) 4.20 - 5.40 x10(6)/mcL    Hgb 10.6 (L) 12.0 - 16.0 g/dL    Hct 33.2 (L) 37.0 - 47.0 %    MCV 85.8 80.0 - 94.0 fL    MCH 27.4 27.0 - 31.0 pg    MCHC 31.9 (L) 33.0 - 36.0 g/dL    RDW 14.9 11.5 - 17.0 %    Platelet 589 (H) 130 - 400 x10(3)/mcL    MPV 9.8 7.4 - 10.4 fL    Neut % 66.5 %    Lymph % 20.1 %    Mono % 9.9 %    Eos % 1.6 %    Basophil % 1.3 %    Lymph # 3.20 0.6 - 4.6 x10(3)/mcL    Neut # 10.60 (H) 2.1 - 9.2 x10(3)/mcL    Mono # 1.58 (H) 0.1 - 1.3 x10(3)/mcL    Eos # 0.26 0 - 0.9 x10(3)/mcL    Baso # 0.20 <=0.2 x10(3)/mcL    IG# 0.09 (H) 0 - 0.04 x10(3)/mcL    IG% 0.6 %    NRBC% 0.0 %   POCT glucose    Collection Time: 06/30/23 10:32 PM   Result Value Ref Range    POCT Glucose 232 (H) 70 - 110 mg/dL   Urinalysis, Reflex to Urine Culture    Collection Time: 06/30/23 10:43 PM    Specimen: Urine   Result Value Ref Range    Color,  UA Dark Yellow Yellow, Light-Yellow, Dark Yellow, Zahraa, Straw    Appearance, UA Clear Clear    Specific Gravity, UA 1.026 1.005 - 1.030    pH, UA 5.0 5.0 - 8.5    Protein, UA 2+ (A) Negative mg/dL    Glucose, UA Negative Negative, Normal mg/dL    Ketones, UA Trace (A) Negative mg/dL    Blood, UA Negative Negative unit/L    Bilirubin, UA 1+ (A) Negative mg/dL    Urobilinogen, UA 1.0 0.2, 1.0, Normal mg/dL    Nitrites, UA Negative Negative    Leukocyte Esterase, UA Negative Negative unit/L   Urinalysis, Microscopic    Collection Time: 06/30/23 10:43 PM   Result Value Ref Range    RBC, UA <5 <=5 /HPF    WBC, UA <5 <=5 /HPF    Squamous Epithelial Cells, UA <5 <=5 /HPF    Bacteria, UA None Seen None Seen, Rare, Occasional /HPF   POCT glucose    Collection Time: 06/30/23 11:25 PM   Result Value Ref Range    POCT Glucose 162 (H) 70 - 110 mg/dL   POCT glucose    Collection Time: 07/01/23 12:55 AM   Result Value Ref Range    POCT Glucose 145 (H) 70 - 110 mg/dL   POCT glucose    Collection Time: 07/01/23  3:30 AM   Result Value Ref Range    POCT Glucose 160 (H) 70 - 110 mg/dL   Comprehensive Metabolic Panel (CMP)    Collection Time: 07/01/23  6:11 AM   Result Value Ref Range    Sodium Level 137 136 - 145 mmol/L    Potassium Level 4.6 3.5 - 5.1 mmol/L    Chloride 101 98 - 107 mmol/L    Carbon Dioxide 22 (L) 23 - 31 mmol/L    Glucose Level 181 (H) 82 - 115 mg/dL    Blood Urea Nitrogen 18.4 9.8 - 20.1 mg/dL    Creatinine 1.68 (H) 0.55 - 1.02 mg/dL    Calcium Level Total 8.4 8.4 - 10.2 mg/dL    Protein Total 6.0 5.8 - 7.6 gm/dL    Albumin Level 3.1 (L) 3.4 - 4.8 g/dL    Globulin 2.9 2.4 - 3.5 gm/dL    Albumin/Globulin Ratio 1.1 1.1 - 2.0 ratio    Bilirubin Total 0.5 <=1.5 mg/dL    Alkaline Phosphatase 66 40 - 150 unit/L    Alanine Aminotransferase 5 0 - 55 unit/L    Aspartate Aminotransferase 14 5 - 34 unit/L    eGFR 30 mls/min/1.73/m2   CBC with Differential    Collection Time: 07/01/23  6:11 AM   Result Value Ref Range    WBC  14.95 (H) 4.50 - 11.50 x10(3)/mcL    RBC 3.50 (L) 4.20 - 5.40 x10(6)/mcL    Hgb 9.5 (L) 12.0 - 16.0 g/dL    Hct 30.6 (L) 37.0 - 47.0 %    MCV 87.4 80.0 - 94.0 fL    MCH 27.1 27.0 - 31.0 pg    MCHC 31.0 (L) 33.0 - 36.0 g/dL    RDW 15.0 11.5 - 17.0 %    Platelet 578 (H) 130 - 400 x10(3)/mcL    MPV 10.7 (H) 7.4 - 10.4 fL    Neut % 68.1 %    Lymph % 19.9 %    Mono % 8.4 %    Eos % 1.9 %    Basophil % 1.1 %    Lymph # 2.98 0.6 - 4.6 x10(3)/mcL    Neut # 10.19 (H) 2.1 - 9.2 x10(3)/mcL    Mono # 1.25 0.1 - 1.3 x10(3)/mcL    Eos # 0.28 0 - 0.9 x10(3)/mcL    Baso # 0.16 <=0.2 x10(3)/mcL    IG# 0.09 (H) 0 - 0.04 x10(3)/mcL    IG% 0.6 %    NRBC% 0.0 %   Iron and TIBC    Collection Time: 07/01/23  6:11 AM   Result Value Ref Range    Iron Binding Capacity Unsaturated 150 70 - 310 ug/dL    Iron Level 26 (L) 50 - 170 ug/dL    Transferrin 155 mg/dL    Iron Binding Capacity Total 176 (L) 250 - 450 ug/dL    Iron Saturation 15 (L) 20 - 50 %   Ferritin    Collection Time: 07/01/23  6:11 AM   Result Value Ref Range    Ferritin Level 149.54 4.63 - 204.00 ng/mL   Vitamin B12    Collection Time: 07/01/23  6:11 AM   Result Value Ref Range    Vitamin B12 Level <148 (L) 213 - 816 pg/mL   POCT glucose    Collection Time: 07/01/23 11:26 AM   Result Value Ref Range    POCT Glucose 273 (H) 70 - 110 mg/dL   POCT glucose    Collection Time: 07/01/23  5:33 PM   Result Value Ref Range    POCT Glucose 242 (H) 70 - 110 mg/dL   Respiratory Panel    Collection Time: 07/01/23  5:41 PM   Result Value Ref Range    Adenovirus Not Detected Not Detected    Coronavirus 229E Not Detected Not Detected    Coronavirus HKU1 Not Detected Not Detected    Coronavirus NL63 Not Detected Not Detected    Coronavirus OC43 PCR, Common Cold Virus Not Detected Not Detected    Human Metapneumovirus Not Detected Not Detected    Parainfluenza Virus 1 Not Detected Not Detected    Parainfluenza Virus 2 Not Detected Not Detected    Parainfluenza Virus 3 Not Detected Not Detected     Parainfluenza Virus 4 Not Detected Not Detected    Bordetella pertussis (ptxP) Not Detected Not Detected    Chlamydia pneumoniae Not Detected Not Detected    Mycoplasma pneumoniae Not Detected Not Detected    Human Rhinovirus/Enterovirus Not Detected Not Detected    Bordetella parapertussis (IV4976) Not Detected Not Detected   POCT glucose    Collection Time: 07/01/23  8:30 PM   Result Value Ref Range    POCT Glucose 290 (H) 70 - 110 mg/dL   POCT glucose    Collection Time: 07/02/23  4:24 AM   Result Value Ref Range    POCT Glucose 277 (H) 70 - 110 mg/dL   Folate    Collection Time: 07/02/23  5:27 AM   Result Value Ref Range    Folate Level 10.5 7.0 - 31.4 ng/mL   Basic Metabolic Panel    Collection Time: 07/02/23  5:27 AM   Result Value Ref Range    Sodium Level 134 (L) 136 - 145 mmol/L    Potassium Level 4.1 3.5 - 5.1 mmol/L    Chloride 99 98 - 107 mmol/L    Carbon Dioxide 22 (L) 23 - 31 mmol/L    Glucose Level 329 (H) 82 - 115 mg/dL    Blood Urea Nitrogen 15.6 9.8 - 20.1 mg/dL    Creatinine 1.38 (H) 0.55 - 1.02 mg/dL    BUN/Creatinine Ratio 11     Calcium Level Total 8.3 (L) 8.4 - 10.2 mg/dL    Anion Gap 13.0 mEq/L    eGFR 39 mls/min/1.73/m2   CBC with Differential    Collection Time: 07/02/23  5:27 AM   Result Value Ref Range    WBC 13.30 (H) 4.50 - 11.50 x10(3)/mcL    RBC 3.70 (L) 4.20 - 5.40 x10(6)/mcL    Hgb 10.1 (L) 12.0 - 16.0 g/dL    Hct 32.3 (L) 37.0 - 47.0 %    MCV 87.3 80.0 - 94.0 fL    MCH 27.3 27.0 - 31.0 pg    MCHC 31.3 (L) 33.0 - 36.0 g/dL    RDW 14.8 11.5 - 17.0 %    Platelet 553 (H) 130 - 400 x10(3)/mcL    MPV 10.1 7.4 - 10.4 fL    Neut % 67.1 %    Lymph % 19.9 %    Mono % 8.0 %    Eos % 3.1 %    Basophil % 1.4 %    Lymph # 2.65 0.6 - 4.6 x10(3)/mcL    Neut # 8.92 2.1 - 9.2 x10(3)/mcL    Mono # 1.07 0.1 - 1.3 x10(3)/mcL    Eos # 0.41 0 - 0.9 x10(3)/mcL    Baso # 0.19 <=0.2 x10(3)/mcL    IG# 0.06 (H) 0 - 0.04 x10(3)/mcL    IG% 0.5 %    NRBC% 0.0 %       Fl Modified Barium Swallow Speech   Final  Result      X-Ray Chest AP Portable   Final Result      No acute abnormality.         Electronically signed by: Zain Duatre MD   Date:    06/30/2023   Time:    23:02      X-Ray Abdomen AP 1 View    (Results Pending)       Imaging:  reviewed    Endoscopy:    reviewed    Assessment/Plan:  Persistent N/V  Ongoing over 6 months  Weight loss 10 pounds  MBS -passed  CVA x 1 week  CKD IIIb  Anemia  Leukocytosis  DM II    Continue PPI and antiemetics    Will plan EGD Monday  NPO after midnight    Thank you for allowing us to participate in the care of Denise Oleary.    Zulma Pino NP as scribe for Dr. Rufino Dorsey

## 2023-07-02 NOTE — PT/OT/SLP EVAL
Occupational Therapy  Evaluation    Name: Denise Oleary  MRN: [id number]  Admitting Diagnosis: Nausea, vomiting, dysphagia for 2 weeks now, recently diagnosed with CVA on 7/21/2023 and was inpatient at [facility] with discharge on 6/25/2023. Recent h/o R ankle fx and sx ~2 months ago per pt's family.  Pt also with h/o essential hypertension, hyperlipidemia, CKD stage IIIB, diabetes mellitus type 2, GERD, anxiety, and depression  Recent Surgery: * No surgery found *      Recommendations:     Discharge Recommendations: nursing facility, skilled  Discharge Equipment Recommendations: TBD, pending progress  Barriers to discharge: Medical dx    Assessment:     Denise Oleary is a 83 y.o. female with a medical diagnosis of Nausea, vomiting, dysphagia for 2 weeks now, recently diagnosed with CVA on 7/21/2023 and was inpatient at [facility] with discharge on 6/25/2023. Recent h/o R ankle fx ~2 months ago per pt's family. Pt also with h/o essential hypertension, hyperlipidemia, CKD stage IIIB, diabetes mellitus type 2, GERD, anxiety, and depression. She presents with L facial droop, decreased FMC and GMC skills of LUE, impaired visual tracking skills, and difficulty following simple commands. Performance deficits affecting function: weakness, impaired endurance, impaired self care skills, impaired functional mobility, impaired balance, visual deficits, decreased safety awareness, impaired coordination, impaired fine motor, decreased ROM, decreased upper extremity function, decreased lower extremity function.      Rehab Prognosis: Good; patient would benefit from acute skilled OT services to address these deficits and reach maximum level of function.       Plan:     Patient to be seen 6 x/week to address the above listed problems via self-care/home management, therapeutic activities, therapeutic exercises, neuromuscular re-education  Plan of Care Expires: 07/14/23  Plan of Care Reviewed with: patient, spouse, son, other (see comments)  "(and multiple other family members)    Subjective     Chief Complaint: Pt denied pain.  Patient/Family Comments/goals: Increase functional independence.    Occupational Profile:  Living Environment: Lives with  and son (who works FT) in [facility] with ramp and R HR to enter. Owns tub/shower.  Previous level of function: Prior to recent CVA, pt required assist with tub/shower t/f but was independent with remainder of ADLs.  DME: Pt owns shower chair, rollator, walker, rolling, bedside commode, wheelchair  Assistance upon Discharge: Pt's family. However, pt would benefit from placement post-d/c to increase pt's functional independence and decrease burden of care.    Patients cultural, spiritual, Jain conflicts given the current situation: no    Objective:     Communicated with: RN prior to session.  Patient found HOB elevated with telemetry, PureWick, peripheral IV upon OT entry to room.    General Precautions: Standard, fall  Orthopedic Precautions: Recent h/o R ankle fx and sx ~2 months ago per pt's family. Per pt's family, MD recommended use of boot while OOB and reported that pt can apply a "little weight through heel" for t/fs. However, since their is no note from MD or proof of current WBS, OT will be conservative and keep pt NWB RLE.   Note: Pt's family reported that pt has ortho f/u appt in ~2 wks.  Braces: R boot  Respiratory Status: Room air  Vital Signs:   Blood Pressure: 116/58. RN notified.  HR: 106    Occupational Performance:    Bed Mobility:    Patient completed Rolling/Turning to R with min-mod A, using side rail.  Patient completed Rolling/Turning to L with CGA, using side rail.  Patient completed Scooting along EOB with max-dep A x2 provided (Note: OT lifted pt's RLE (with boot donned) off floor to keep pt NWB).   Patient completed Supine to Sit with max A x2.  Patient completed Sit to Supine with max A x2.    Functional Mobility/Transfers:  Attempted to perform sit <> stand from EOB using RW " with 2 person assist provided (Note: OT lifted pt's RLE (with boot donned) off floor to keep pt NWB). However, pt was unable to clear buttocks off bed despite multiple attempts. Pt demonstrated increased difficulty following simple command for proper hand placement despite max vcs and [place] A provided.    Activities of Daily Living:  Lower Body Dressing: Dep A to don R sock and boot (while lying in bed) and to don L sock (while EOB).    Cognitive/Visual Perceptual:  Cognitive/Psychosocial Skills:     -       Oriented to: Person, Place, and Situation. Required education on year 2/2 pt stating that the year was 2083.  -       Follows Commands/attention: Difficulty following simple command  -       Safety awareness/insight to disability: impaired   Visual/Perceptual:      -        Impaired visual tracking skills    Physical Exam:  Sensation:    -       Intact per pt  Upper Extremity Range of Motion:     -       Right Upper Extremity: WFL with only min deficit in AROM of R shoulder flex  -       Left Upper Extremity: WFL except mod deficit in AROM of L shoulder flex  Upper Extremity Strength:    -       Right Upper Extremity: Grossly 4/5 except R shoulder flex=3-/5  -       Left Upper Extremity: Grossly 4-/5 except L shoulder flex=3-/5  Fine Motor Coordination:    -       Impaired Left hand thumb/finger opposition skills   Gross motor coordination: Impaired L finger-to-nose    Therapeutic Positioning  Risk for acquired pressure injuries is increased due to impaired mobility.    Skin assessment:    Findings:  Bruises noted on BU/LE and buttock    OT recommendations for therapeutic positioning throughout hospitalization:   Follow [facility] Pressure Injury Prevention Protocol  Geomat recommended for sacral protection while UIC  [employer]    Note: RN checked pt's glucose level, and administered insulin during eval.    Patient left HOB elevated with all lines intact, call button in reach, bed alarm on, and pt's family  present.    GOALS:   Multidisciplinary Problems       Occupational Therapy Goals          Problem: Occupational Therapy    Goal Priority Disciplines Outcome Interventions   Occupational Therapy Goal     OT, PT/OT Ongoing, Progressing    Description: Goals to be met by: 7/14/23     Patient will increase functional independence with ADLs by performing:    UE Dressing with Set-up Assistance.  LE Dressing with Moderate Assistance and Assistive Devices as needed.  Grooming while EOB with Set-up Assistance.  Toileting from bedside commode with Moderate Assistance for hygiene and clothing management.                          History:     Past Medical History:   Diagnosis Date    Altered mental status     Anxiety and depression     Class 2 severe obesity due to excess calories with serious comorbidity and body mass index (BMI) of 35.0 to 35.9 in adult 4/12/2023    Gastroesophageal reflux disease without esophagitis 10/4/2022    Hypertension     Mixed hyperlipidemia     Primary insomnia     Restless leg syndrome     Stage 3b chronic kidney disease 10/4/2022         Past Surgical History:   Procedure Laterality Date    Excision of Pancreas      Excision of Stomach      Gastrointestinal Biopsy      HEMORRHOID SURGERY      TOTAL KNEE ARTHROPLASTY      TUBAL LIGATION         Time Tracking:     OT Date of Treatment: 7/2/23  OT Start Time: 1155  OT Stop Time: 1247  OT Total Time (min): 52 min    Billable Minutes: Evaluation Mod complexity 52 mins    7/2/2023

## 2023-07-03 NOTE — PROGRESS NOTES
[facility] Hospital Medicine Progress Note        Chief Complaint: Inpatient Follow-up for  persistent nausea and vomiting     HPI:   Denise Oleary is a 83 y.o. female with a past medical history of essential hypertension, hyperlipidemia, CKD stage IIIB, recent CVA with left-sided deficits, diabetes mellitus type 2, GERD, anxiety, and depression presented to [facility] on 6/30/2023 for nausea and vomiting.  Patient reports nausea and vomiting began 5 days ago.  Patient also endorsed difficulty swallowing.  Family reported patient was recently diagnosed with CVA on 7/21/2023 and was inpatient at [facility] with discharge on 6/25/2023.   Initial vital signs in ED were /58, pulse 83, respirations 16, temperature 36.9° C, and SpO2 95% on room air.  Labs revealed WBC 15.93, RBC 3.87, hemoglobin 10.6, hematocrit 33.2, platelets 589, CO2 22, BUN 20.1, creatinine 1.82, and glucose 47.  UA revealed 2+ protein and trace ketones.  Chest x-ray revealed no acute abnormality.  Patient was given D10 bolus in ED with improvement of glucose.  Patient was given IM Phenergan with some improvement of nausea.  Patient was admitted to hospital medicine service for further medical management.    Nausea and vomiting improved since admission and tolerating soft and bite sized diet  however reports this has been going on  on and off for more than a year now . Pt had EUS done in 5/2020 showed diffuse dilation of main pancreatic duct up to 11mm with pancreatic atrophy, no evidence of pancreatic mass, area of pancreatitc duct transition in head of gland, FNA - negative for malignant cells. She is under surveillance with Dr. Ketan Shepard.        Interval Hx:   NPO for EGD today.  No further hallucination reported   No new c/o today   BP control is fair  WBC normalized   Hgb fairly stable at 9.1  Creatinine down to 1.2 , Mg 0.9    Case was discussed with patient's nurse and  on the  floor.    Objective/physical exam:  General: In no acute distress, afebrile  Chest: Clear to auscultation bilaterally  Heart: RRR, +S1, S2, no appreciable murmur  Abdomen: Soft, nontender, BS +  MSK: Warm, no lower extremity edema, no clubbing or cyanosis, (+) bruise . Bandage noted Rt ankle where she had surgery 2 mos ago.   Neurologic: Currently awake, alert, oriented x 3, Cranial nerve II-XII intact except VII, Left tess paresis     VITAL SIGNS: 24 HRS MIN & MAX LAST   Temp  Min: 97.2 °F (36.2 °C)  Max: 98.4 °F (36.9 °C) 97.9 °F (36.6 °C)   BP  Min: 106/55  Max: 163/68 120/63   Pulse  Min: 82  Max: 95  84   Resp  Min: 16  Max: 28 (!) 28   SpO2  Min: 94 %  Max: 98 % 98 %     I have reviewed the following labs:    Recent Labs   Lab 07/01/23  0611 07/02/23  0527 07/03/23  0542   WBC 14.95* 13.30* 9.97   RBC 3.50* 3.70* 3.40*   HGB 9.5* 10.1* 9.1*   HCT 30.6* 32.3* 28.8*   MCV 87.4 87.3 84.7   MCH 27.1 27.3 26.8*   MCHC 31.0* 31.3* 31.6*   RDW 15.0 14.8 14.8   * 553* 510*   MPV 10.7* 10.1 10.3       Recent Labs   Lab 06/30/23  2112 07/01/23  0611 07/02/23  0527 07/03/23  0541 07/03/23  1258    137 134* 136  --    K 3.7 4.6 4.1 4.2  --    CO2 22* 22* 22* 23  --    BUN 20.1 18.4 15.6 16.0  --    CREATININE 1.82* 1.68* 1.38* 1.29*  --    CALCIUM 9.2 8.4 8.3* 8.1*  --    MG  --   --   --  0.90* 2.80*   ALBUMIN 3.1* 3.1*  --   --   --    ALKPHOS 68 66  --   --   --    ALT 6 5  --   --   --    AST 14 14  --   --   --    BILITOT 0.4 0.5  --   --   --           Microbiology Results (last 7 days)       ** No results found for the last 168 hours. **             See below for Radiology    Scheduled Med:   amLODIPine  10 mg Oral Daily    ampicillin-sulbactim (UNASYN) IVPB  3 g Intravenous Q12H    atorvastatin  40 mg Oral QHS    B12-levomefolate calcium-B6  1 tablet Oral Daily    cloNIDine 0.2 mg/24 hr td ptwk  1 patch Transdermal Q7 Days    clopidogreL  75 mg Oral Daily    collagenase   Topical (Top) Daily     enoxparin  40 mg Subcutaneous Daily    gabapentin  100 mg Oral QHS    hydrALAZINE  50 mg Oral Q8H    insulin detemir U-100  20 Units Subcutaneous BID    [START ON 7/4/2023] magnesium oxide  400 mg Oral Daily    megestroL  400 mg Oral Daily    metoprolol succinate  50 mg Oral Daily    pantoprazole  40 mg Oral Daily    QUEtiapine  25 mg Oral After dinner    thiamine  100 mg Oral BID        Continuous Infusions:   lactated ringers 75 mL/hr at 07/03/23 0811        PRN Meds:  acetaminophen, acetaminophen, ALPRAZolam, butalbital-acetaminophen-caffeine -40 mg, diphenhydrAMINE, hydrALAZINE, HYDROcodone-acetaminophen, insulin aspart U-100, melatonin, naloxone, ondansetron, prochlorperazine, sodium chloride 0.9%       Assessment/Plan:  Intractable Nausea and Vomiting   Acute kidney injury on CKD III, pre renal due to IVVD  Metabolic acidosis , mild   Leukocytosis   Presumed aspiration pneumonitis in the setting of recurrent vomiting   Normochromic anemia   Thrombocytosis , reactive   Severe vitamin B12 deficiency   Diabetes Mellitus, type 2- uncontrolled due to hypo and hyperglycemia   Recent  Rt  MCA/ GERTRUDIS distribution stroke with left sided deficit - 6/21/23  Recent Rt ankle fracture sec to fall  s/p ORIF - 5/2023  Hallucination / Delirium - improved   Severe Hypomagnesemia - 7/3/23     Hx- Essential HTN, HLD, CKD IIIB, GERD, anxiety/ Depression      Plan-     GI consult obtained and scheduled for EGD today for further evaluation of recurrent N/V  Continue IVF resuscitation , monitor renal parameter, renal recovery and UOP  Continue antibiotic targeting Aspiration pneumonia x 3 days only   Monitor clinical course      Monitor blood cell counts   Replace B12  WBC normalized   Hgb fairly stable at 9.1  Creatinine down to 1.2 , Mg 0.9  Replete electrolytes as indicated      Accu check AC/HS and cover with sliding scale insulin and add basal insulin      PT/OT/ SLP consult      Avoid hypnotics and sedative   Ambien  discontinued   Seroquel added nightly   CT head with no acute intracranial process except evidence of recent stroke      Continue supportive treatment with anti emetics and others       Critical care diagnosis:  Severe hypomagnesemia required IV replacement   Critical care interventions: Hands-on evaluation, review of labs/radiographs/records and discussion with patient and family if present  Critical care time spent: 35 minutes       VTE prophylaxis: Lovenox     Patient condition:  Fair     Anticipated discharge and Disposition:     SNF     All diagnosis and differential diagnosis have been reviewed; assessment and plan has been documented; I have personally reviewed the labs and test results that are presently available; I have reviewed the patients medication list; I have reviewed the consulting providers response and recommendations. I have reviewed or attempted to review medical records based upon their availability    All of the patient's questions have been  addressed and answered. Patient's is agreeable to the above stated plan. I will continue to monitor closely and make adjustments to medical management as needed.  _____________________________________________________________________    Nutrition Status:    Radiology:  I have personally reviewed the following imaging and agree with the radiologist.     CT Head Without Contrast  Narrative: EXAMINATION:  CT HEAD WITHOUT CONTRAST    CLINICAL HISTORY:  Stroke, follow up;Mental status change, unknown cause;    TECHNIQUE:  Low dose axial CT images obtained throughout the head without intravenous contrast. Sagittal and coronal reconstructions were performed.  An automated dose exposure technique was utilized this limits radiation does the patient.    COMPARISON:  06/03/2016    FINDINGS:  Intracranial compartment:    Ventricles and sulci are normal in size for age without evidence of hydrocephalus. No extra-axial blood or fluid collections.    The brain parenchyma  appears normal. No parenchymal mass, hemorrhage, edema or major vascular distribution infarct.    Skull/extracranial contents (limited evaluation): No fracture. Mastoid air cells and paranasal sinuses are essentially clear.  Impression: No acute intracranial abnormality.  Findings consistent with infarct of the right MCA distribution as well as right GERTRUDIS distribution.  Changes of microangiopathy as well as age-appropriate volume loss.    Electronically signed by: Zain Duarte MD  Date:    07/02/2023  Time:    14:10  X-Ray Ankle Complete Right  Narrative: EXAMINATION:  XR ANKLE COMPLETE 3 VIEW RIGHT    CLINICAL HISTORY:  Rt ankle fracture;    TECHNIQUE:  Three views    COMPARISON:  May 31, 2023    FINDINGS:  There has been ORIF of the fractured distal fibula with placement of plate and screws.  Position and alignment is satisfactory.  Calcaneal enthesophyte.  Impression: As above.    Electronically signed by: Mahendra Addison  Date:    07/02/2023  Time:    11:59  X-Ray Abdomen AP 1 View  Narrative: EXAMINATION:  XR ABDOMEN AP 1 VIEW    CLINICAL HISTORY:  vomiting;    TECHNIQUE:  One view    COMPARISON:  None available    FINDINGS:  The intestinal gas pattern is nonspecific and nonobstructive. No air fluid levels or pneumoperitoneum identified. No convincing organomegaly.  Impression: Nonspecific bowel gas pattern.    Electronically signed by: Mahendra Addison  Date:    07/02/2023  Time:    09:46      Jerome Tabor MD   07/03/2023

## 2023-07-03 NOTE — NURSING
[facility] - 5th Floor Med Surg  Wound Care    Patient Name:  Denise Oleary   MRN:  [id number]  Date: 7/3/2023  Diagnosis: <principal problem not specified>    History:     Past Medical History:   Diagnosis Date    Altered mental status     Anxiety and depression     Class 2 severe obesity due to excess calories with serious comorbidity and body mass index (BMI) of 35.0 to 35.9 in adult 4/12/2023    Gastroesophageal reflux disease without esophagitis 10/4/2022    Hypertension     Mixed hyperlipidemia     Primary insomnia     Restless leg syndrome     Stage 3b chronic kidney disease 10/4/2022       Social History     Socioeconomic History    Marital status:    Tobacco Use    Smoking status: Never    Smokeless tobacco: Never   Substance and Sexual Activity    Alcohol use: Not Currently     Social Determinants of Health     Financial Resource Strain: Low Risk     Difficulty of Paying Living Expenses: Not hard at all   Food Insecurity: No Food Insecurity    Worried About Running Out of Food in the Last Year: Never true    Ran Out of Food in the Last Year: Never true   Transportation Needs: No Transportation Needs    Lack of Transportation (Medical): No    Lack of Transportation (Non-Medical): No   Physical Activity: Sufficiently Active    Days of Exercise per Week: 5 days    Minutes of Exercise per Session: 30 min   Stress: No Stress Concern Present    Feeling of Stress : Not at all   Social Connections: Socially Integrated    Frequency of Communication with Friends and Family: More than three times a week    Frequency of Social Gatherings with Friends and Family: More than three times a week    Attends Shinto Services: More than 4 times per year    Attends Club or Organization Meetings: More than 4 times per year    Marital Status:    Housing Stability: Unknown    Unable to Pay for Housing in the Last Year: No    Unstable Housing in the Last Year: No       Precautions:     Allergies as  of 06/30/2023    (No Known Allergies)       WO Assessment Details/Treatment   Consult received for right medial ankle wound. Patient responsive , but unable to give history of wound. Daughter states that it is due to surgery she had on ankle few months ago. Right medial ankle with thick leathery slough.       07/03/23 1210   WOCN Assessment   WOCN Total Time (mins) 30   Visit Date 07/03/23   Visit Time 1210   Consult Type New   WOCN Speciality Wound   Wound surgical   Number of Wounds 1   Intervention assessed;changed;applied;chart review;orders        Altered Skin Integrity 07/03/23 1210 Right lower;medial Ankle #1 Other (comment)   Date First Assessed/Time First Assessed: 07/03/23 1210   Altered Skin Integrity Present on Admission - Did Patient arrive to the hospital with altered skin?: yes  Side: Right  Orientation: lower;medial  Location: Ankle  Wound Number: #1  Is this injur...   Wound Image    Dressing Appearance Intact   Drainage Amount Small   Drainage Characteristics/Odor Clear;Serous   Appearance Slough   Tissue loss description Full thickness   Periwound Area Intact   Wound Edges Defined   Wound Length (cm) 2.4 cm   Wound Width (cm) 1.4 cm   Wound Depth (cm) 0.2 cm   Wound Volume (cm^3) 0.672 cm^3   Wound Surface Area (cm^2) 3.36 cm^2   Care Wound cleanser   Dressing Applied  (Mesalt, gauze 4x4, cloth tape)         Recommendations made to primary team for wound care with santyl and mesalt daily . Orders placed.     07/03/2023

## 2023-07-03 NOTE — PLAN OF CARE
Patient choice list given would be ok with either facility in [place] [facility] signed referral sent via [facility]

## 2023-07-03 NOTE — PLAN OF CARE
07/03/23 1138   Discharge Assessment   Assessment Type Discharge Planning Assessment   Confirmed/corrected address, phone number and insurance Yes   Confirmed Demographics Correct on Facesheet   Source of Information family   Communicated SHRUTHI with patient/caregiver Date not available/Unable to determine   Reason For Admission VERNON chest pain   People in Home spouse;child(bryon), adult   Do you expect to return to your current living situation? Yes   Walking or Climbing Stairs transferring difficulty, dependent;stair climbing difficulty, dependent;ambulation difficulty, dependent   Mobility Management since stroke hard baseline change   Equipment Currently Used at Home walker, rolling;rollator;shower chair;commode   Readmission within 30 days? No   Patient currently being followed by outpatient case management? No   Do you currently have service(s) that help you manage your care at home? No   Do you take prescription medications? Yes   Do you have prescription coverage? Yes   Do you have any problems affording any of your prescribed medications? No   Is the patient taking medications as prescribed? yes   How do you get to doctors appointments? family or friend will provide   Are you on dialysis? No   Do you take coumadin? No   Discharge Plan A Skilled Nursing Facility   Discharge Plan B Skilled Nursing Facility   DME Needed Upon Discharge  none   Discharge Plan discussed with: Patient;Adult children   Transition of Care Barriers None

## 2023-07-03 NOTE — PT/OT/SLP PROGRESS
Occupational Therapy   Treatment    Name: Denise Oleary  MRN: [id number]    OT attempted to see pt for tx session. However, pt is currently off floor for EGD per RN. OT will f/u as able.

## 2023-07-03 NOTE — PT/OT/SLP EVAL
Physical Therapy Evaluation    Patient Name:  Denise Oleary   MRN:  [id number]    Recommendations:     Discharge Recommendations: nursing facility, skilled, LTACH (long-term acute care hospital)   Discharge Equipment Recommendations: none   Barriers to discharge: decreased fxn'l mobility, weaknness, endurance    Assessment:     Denise Oleary is a 83 y.o. female admitted with a medical diagnosis of  CVA with left sided weakness and a recent ankle fx(ORIF) 2 months ago.   She presents with the following impairments/functional limitations: weakness, impaired endurance, impaired functional mobility, impaired self care skills, gait instability, impaired balance, decreased coordination, decreased lower extremity function, decreased upper extremity function, decreased safety awareness, pain . Pt would initially benefit from going to a skilled nh secondary to LLE weakness from stroke and NWB status on RLE. I do think that once she is WBAT on RLE acute rehab would be appropriate    Rehab Prognosis: Good; patient would benefit from acute skilled PT services to address these deficits and reach maximum level of function.    Recent Surgery: Procedure(s) (LRB):  EGD (N/A)      Plan:     During this hospitalization, patient to be seen 6 x/week to address the identified rehab impairments via gait training, therapeutic activities, therapeutic exercises, wheelchair management/training and progress toward the following goals:    Plan of Care Expires:  07/17/23    Subjective     Chief Complaint: headache  Patient/Family Comments/goals:   Pain/Comfort:  Pain Rating 1: 5/10  Location 1: head    Patients cultural, spiritual, [employer] conflicts given the current situation:      Living Environment:  Pt lives with her  and her son evelyn does work during the day. She lives in a house with a ramp and a right sided handrail  Prior to admission, patients level of function was needing assist with getting in tub but was fairly ind with  everything else.  Equipment used at home: walker, rolling, rollator, bedside commode, shower chair, wheelchair.  DME owned (not currently used): none.  Upon discharge, patient will have assistance from  and then son when he is home.    Objective:     Communicated with nurse prior to session.  Patient found supine with PureWick, peripheral IV  upon PT entry to room.    General Precautions: Standard, fall  Orthopedic Precautions: (pt is allowed a little wt through her right leg, but have not gotten a specific order for this hospital stay. its probably a good idea to let her remain NWB RLE until doctor gives the ok here during this hospital stay)   Braces:  (she does have a boot for RLE)  Respiratory Status: Room air  Blood Pressure: 124/72      Exams:  Cognitive Exam:  Patient is oriented to Person, Place, Time, and Situation  LLE AROM is WFL except for hip flex which is limited  LLE strength- 3-/5 hip, 3 to 3+/5 for rest of LLE  RLE AROM is WFL  RLE Strength- 3+/5 hip and 4/5 rest of leg except ankle not tested due to fx recently  Skin integrity: Visible skin intact      Functional Mobility:  Bed Mobility:     Scooting: maximal assistance  Supine to Sit: maximal assistance  Sit to Supine: maximal assistance  Transfers:     Sit to Stand:  maximal assistance with no AD  Bed to Chair: maximal assistance with  no AD  using  Stand Pivot  Gait: pt because of wt limits on right leg and weakness from stroke on LLE, pt was not able to walk      AM-PAC 6 CLICK MOBILITY  Total Score:10       Treatment & Education:      Patient and spouse provided with verbal education regarding POC and explaining where she might go to further rehab ahter she is discharged from acute care.  Understanding was verbalized.     Patient left supine with all lines intact and call button in reach.    GOALS:   Multidisciplinary Problems       Physical Therapy Goals          Problem: Physical Therapy    Goal Priority Disciplines Outcome Goal  Variances Interventions   Physical Therapy Goal     PT, PT/OT Ongoing, Progressing     Description: Pt will be seen for the following goals:  1. Pt will be sba wit all bed mobility  2. Pt will be sba with sit to stand and bed to chair transfers.  3. Pt will perform wc mobility sba for at least 50feet                       History:     Past Medical History:   Diagnosis Date    Altered mental status     Anxiety and depression     Class 2 severe obesity due to excess calories with serious comorbidity and body mass index (BMI) of 35.0 to 35.9 in adult 4/12/2023    Gastroesophageal reflux disease without esophagitis 10/4/2022    Hypertension     Mixed hyperlipidemia     Primary insomnia     Restless leg syndrome     Stage 3b chronic kidney disease 10/4/2022       Past Surgical History:   Procedure Laterality Date    Excision of Pancreas      Excision of Stomach      Gastrointestinal Biopsy      HEMORRHOID SURGERY      TOTAL KNEE ARTHROPLASTY      TUBAL LIGATION         Time Tracking:     PT Received On:    PT Start Time: 0900     PT Stop Time: 0944  PT Total Time (min): 44 min     Billable Minutes: Evaluation 44      07/03/2023

## 2023-07-03 NOTE — TRANSFER OF CARE
"Anesthesia Transfer of Care Note    Patient: Denise Oleary    Procedure(s) Performed: Procedure(s):  EGD, WITH CLOSED BIOPSY    Patient location: GI    Anesthesia Type: general    Transport from OR: Transported from OR on room air with adequate spontaneous ventilation    Post pain: adequate analgesia    Post assessment: no apparent anesthetic complications and tolerated procedure well    Post vital signs: stable    Level of consciousness: awake and alert    Nausea/Vomiting: no nausea/vomiting    Complications: none    Transfer of care protocol was followed      Last vitals:   Visit Vitals  /66 (BP Location: Right arm, Patient Position: Lying)   Pulse 87   Temp 36.2 °C (97.2 °F)   Resp 16   Ht 5' 2" (1.575 m)   Wt 81.6 kg (180 lb)   SpO2 (!) 94%   Breastfeeding No   BMI 32.92 kg/m²     "

## 2023-07-03 NOTE — PLAN OF CARE
Problem: Physical Therapy  Goal: Physical Therapy Goal  Description: Pt will be seen for the following goals:  1. Pt will be sba wit all bed mobility  2. Pt will be sba with sit to stand and bed to chair transfers.  3. Pt will perform wc mobility sba for at least 50feet  Outcome: Ongoing, Progressing

## 2023-07-03 NOTE — CONSULTS
Inpatient consult to Cardiology  Consult performed by: Jerome Tabor MD  Consult ordered by: Andreea Cole MD  Reason for consult: SVT      [facility] 5[facility] Med Surg  Cardiology  Consult Note    Patient Name: Denise Oleary  MRN: [id number]  Admission Date: 6/30/2023  Hospital Length of Stay: 2 days  Code Status: Full Code   Attending Provider: Jerome Tabor MD   Consulting Provider: Kartik Suarez MD  Primary Care Physician: Yan Ferro MD  Principal Problem:<principal problem not specified>    Patient information was obtained from patient, past medical records, ER records, and primary team.     Subjective:     Chief Complaint: Reason for Consult: SVT     HPI: Ms. Oleary is a 82 y/o female with a history of HTN, HLD, DM II, RLS, GERD, CVA (6.21.23), and CKD, who is known to [facility], Dr. Gaffney. She presented to the ER on 6.30.23 with Gastritis, Aspiration PNA, and Dysphagia. On 7.2.23, she started having episodes of SVT. Significant labs include H&H 9.1/28.8, Plts 510, Creat 1.29, Glucose 166, and Mag 0.90. [facility] is consulted for suspected SVT.     PMH: HTN, HLD, DM II, RLS, GERD, CVA (6.21.23), MANJULA, CKD, Obesity, Insomnia, Anxiety, Depression,    PSH: Pancreas Excision, Stomach Excision, Gastrointestinal Biopsy, Hemorrhoid surgery, Total Knee Arthroplasty, Tubal Ligation  Family History: Father - HTN; Mother - Heart problem  Social History: Hugh smoking, alcohol use, and illicit drug use.    Previous Cardiac Diagnostics:   ECHO (6.21.23):  Normal left ventricular size and function. EF of 55%. Moderate concentric left ventricular hypertrophy with diastolic   Dysfunction.Trivial mitral regurgitation. Mild tricuspid regurgitation with estimated right ventricular systolic   pressure 52 mmHg consistent with pulmonary hypertension. Positive bubble crossover consistent with ASD or PFO with right to left shunt.     PET (10.27.22):  This is a normal perfusion study, no perfusion defects noted.  There is no evidence of ischemia. This scan is suggestive of low risk for future cardiovascular events. The left ventricular cavity is noted to be normal on the stress studies. The stress left ventricular ejection fraction was calculated to be 66% and left ventricular global function is normal. The rest left ventricular cavity is noted to be normal. The rest left ventricular ejection fraction was calculated to be 64% and rest left ventricular global function is normal. When compared to the resting ejection fraction (64%), the stress ejection fraction (66%) has increased. The study quality is excellent. There was a rise in myocardial blood flow between rest and stress.  Global myocardial blood flow reserve was 2.20.  Normal global coronary flow reserve is suggestive of low coronary event risk.    Carotid US (10.27.22):   The study quality is average. 1-39% stenosis in the proximal right internal carotid artery based on Bluth Criteria. 1-39% stenosis in the proximal left internal carotid artery based on Bluth Criteria. Antegrade right vertebral artery flow. Antegrade left vertebral artery flow.     Review of patient's allergies indicates:  No Known Allergies    No current facility-administered medications on file prior to encounter.     Current Outpatient Medications on File Prior to Encounter   Medication Sig    ADULT LOW DOSE ASPIRIN 81 mg EC tablet     ALPRAZolam (XANAX) 0.25 MG tablet Take 1 tablet (0.25 mg total) by mouth daily as needed for Anxiety.    atorvastatin (LIPITOR) 80 MG tablet Take 80 mg by mouth every evening.    benzonatate (TESSALON) 200 MG capsule Take 200 mg by mouth 3 (three) times daily as needed for Cough.    clopidogreL (PLAVIX) 75 mg tablet Take 75 mg by mouth once daily.    FLUoxetine 60 mg Tab Take 60 mg by mouth once daily.    gabapentin (NEURONTIN) 300 MG capsule Take 1 capsule (300 mg total) by mouth once daily.    insulin aspart protamine-insulin aspart (NOVOLOG 70/30) 100 unit/mL  (70-30) InPn pen Inject into the skin 3 (three) times daily before meals.    insulin detemir U-100 (LEVEMIR) 100 unit/mL injection Inject into the skin once daily.    rOPINIRole (REQUIP) 1 MG tablet Take 1 tablet (1 mg total) by mouth daily as needed.    zolpidem (AMBIEN) 5 MG Tab Take 1 tablet (5 mg total) by mouth nightly as needed.    amLODIPine (NORVASC) 5 MG tablet Take 5 mg by mouth once daily.    azelastine (ASTELIN) 137 mcg (0.1 %) nasal spray 2 sprays once daily.    blood-glucose meter,continuous (DEXCOM G6 ) Misc 1 each by Misc.(Non-Drug; Combo Route) route every 14 (fourteen) days.    blood-glucose sensor (DEXCOM G6 SENSOR) Cee 10 each by Misc.(Non-Drug; Combo Route) route every 14 (fourteen) days.    blood-glucose transmitter (DEXCOM G6 TRANSMITTER) Cee 1 each by Misc.(Non-Drug; Combo Route) route every 14 (fourteen) days.    calcium-vitamin D3 (OS-MARIAJOSE 500 + D3) 500 mg-5 mcg (200 unit) per tablet Take 1 tablet by mouth.    CONTOUR NEXT EZ METER Misc use as directed    CONTOUR NEXT TEST STRIPS Strp 1 strip by Misc.(Non-Drug; Combo Route) route 4 (four) times daily.    diphenoxylate-atropine 2.5-0.025 mg (LOMOTIL) 2.5-0.025 mg per tablet Take 1 tablet by mouth every 6 (six) hours.    doxycycline monohydrate 100 mg Tab SMARTSI Tablet(s) By Mouth Morning-Night    famotidine (PEPCID) 40 MG tablet Take 40 mg by mouth nightly.    flash glucose scanning reader (FREESTYLE ANNE 2 READER) Misc 1 each by Misc.(Non-Drug; Combo Route) route once daily.    flash glucose sensor (FREESTYLE ANNE 2 SENSOR) Kit 1 each by Misc.(Non-Drug; Combo Route) route every 14 (fourteen) days.    fluticasone propionate (FLONASE) 50 mcg/actuation nasal spray 2 sprays by Each Nostril route once daily.    hydrALAZINE (APRESOLINE) 25 MG tablet taek ONE TABLET BY MOUTH TWICE DAILY    hydrOXYzine HCL (ATARAX) 25 MG tablet Take 1 tablet (25 mg total) by mouth daily as needed. (Patient not taking: Reported on 2023)     LEVEMIR FLEXPEN 100 unit/mL (3 mL) InPn pen inject 75 UNITS subcutaneously DAILY    meloxicam (MOBIC) 7.5 MG tablet Take 1 tablet (7.5 mg total) by mouth once daily. (Patient not taking: Reported on 6/27/2023)    metFORMIN (GLUCOPHAGE) 500 MG tablet TAKE TWO TABLETS BY MOUTH EVERY MORNING AND TWO TABLETS BY MOUTH AT BEDTIME    metoprolol succinate (TOPROL-XL) 50 MG 24 hr tablet TAKE ONE TABLET BY MOUTH TWICE DAILY    MICROLET LANCET Misc CHECK BLOOD SUGAR TWICE DAILY    NOVOLOG FLEXPEN U-100 INSULIN 100 unit/mL (3 mL) InPn pen Inject 10 Units into the skin 3 (three) times daily with meals.    nystatin (MYCOSTATIN) cream Apply topically 2 (two) times daily.    olmesartan-hydrochlorothiazide (BENICAR HCT) 40-25 mg per tablet Take 1 tablet by mouth every morning.    pantoprazole (PROTONIX) 40 MG tablet Take 40 mg by mouth once daily.    promethazine (PHENERGAN) 25 MG tablet Take 1-2 tablets (25-50 mg total) by mouth every 6 (six) hours as needed for Nausea. (Patient not taking: Reported on 6/27/2023)    simvastatin (ZOCOR) 10 MG tablet TAKE ONE TABLET BY MOUTH IN THE EVENING (Patient not taking: Reported on 6/27/2023)    sucralfate (CARAFATE) 1 gram tablet Take 1 g by mouth 4 (four) times daily.    triamcinolone acetonide 0.1% (KENALOG) 0.1 % cream Apply topically nightly.     Review of Systems   HENT:          Headache   Respiratory:  Negative for chest tightness and shortness of breath.    Cardiovascular:  Negative for chest pain, palpitations and leg swelling.   Gastrointestinal:  Positive for diarrhea, nausea and vomiting.   Neurological:  Positive for weakness.   All other systems reviewed and are negative.    Objective:     Vital Signs (Most Recent):  Temp: 97.8 °F (36.6 °C) (07/03/23 0541)  Pulse: 89 (07/03/23 0541)  Resp: 18 (07/03/23 0541)  BP: (!) 163/68 (07/03/23 0810)  SpO2: 97 % (07/03/23 0541) Vital Signs (24h Range):  Temp:  [97.6 °F (36.4 °C)-98.4 °F (36.9 °C)] 97.8 °F (36.6 °C)  Pulse:  [82-96]  89  Resp:  [18] 18  SpO2:  [95 %-97 %] 97 %  BP: (120-163)/(68-79) 163/68     Weight: 81.6 kg (180 lb)  Body mass index is 32.92 kg/m².    SpO2: 97 %     Intake/Output Summary (Last 24 hours) at 7/3/2023 0836  Last data filed at 7/3/2023 0634  Gross per 24 hour   Intake 1200 ml   Output 600 ml   Net 600 ml     Lines/Drains/Airways       Drain  Duration             Female External Urinary Catheter 06/30/23 2223 2 days              Peripheral Intravenous Line  Duration                  Peripheral IV - Single Lumen 07/03/23 0734 20 G Left Antecubital <1 day                  Significant Labs:  Recent Results (from the past 72 hour(s))   Comprehensive metabolic panel    Collection Time: 06/30/23  9:12 PM   Result Value Ref Range    Sodium Level 136 136 - 145 mmol/L    Potassium Level 3.7 3.5 - 5.1 mmol/L    Chloride 102 98 - 107 mmol/L    Carbon Dioxide 22 (L) 23 - 31 mmol/L    Glucose Level 47 (LL) 82 - 115 mg/dL    Blood Urea Nitrogen 20.1 9.8 - 20.1 mg/dL    Creatinine 1.82 (H) 0.55 - 1.02 mg/dL    Calcium Level Total 9.2 8.4 - 10.2 mg/dL    Protein Total 7.0 5.8 - 7.6 gm/dL    Albumin Level 3.1 (L) 3.4 - 4.8 g/dL    Globulin 3.9 (H) 2.4 - 3.5 gm/dL    Albumin/Globulin Ratio 0.8 (L) 1.1 - 2.0 ratio    Bilirubin Total 0.4 <=1.5 mg/dL    Alkaline Phosphatase 68 40 - 150 unit/L    Alanine Aminotransferase 6 0 - 55 unit/L    Aspartate Aminotransferase 14 5 - 34 unit/L    eGFR 28 mls/min/1.73/m2   CBC with Differential    Collection Time: 06/30/23  9:12 PM   Result Value Ref Range    WBC 15.93 (H) 4.50 - 11.50 x10(3)/mcL    RBC 3.87 (L) 4.20 - 5.40 x10(6)/mcL    Hgb 10.6 (L) 12.0 - 16.0 g/dL    Hct 33.2 (L) 37.0 - 47.0 %    MCV 85.8 80.0 - 94.0 fL    MCH 27.4 27.0 - 31.0 pg    MCHC 31.9 (L) 33.0 - 36.0 g/dL    RDW 14.9 11.5 - 17.0 %    Platelet 589 (H) 130 - 400 x10(3)/mcL    MPV 9.8 7.4 - 10.4 fL    Neut % 66.5 %    Lymph % 20.1 %    Mono % 9.9 %    Eos % 1.6 %    Basophil % 1.3 %    Lymph # 3.20 0.6 - 4.6 x10(3)/mcL     Neut # 10.60 (H) 2.1 - 9.2 x10(3)/mcL    Mono # 1.58 (H) 0.1 - 1.3 x10(3)/mcL    Eos # 0.26 0 - 0.9 x10(3)/mcL    Baso # 0.20 <=0.2 x10(3)/mcL    IG# 0.09 (H) 0 - 0.04 x10(3)/mcL    IG% 0.6 %    NRBC% 0.0 %   POCT glucose    Collection Time: 06/30/23 10:32 PM   Result Value Ref Range    POCT Glucose 232 (H) 70 - 110 mg/dL   Urinalysis, Reflex to Urine Culture    Collection Time: 06/30/23 10:43 PM    Specimen: Urine   Result Value Ref Range    Color, UA Dark Yellow Yellow, Light-Yellow, Dark Yellow, Zahraa, Straw    Appearance, UA Clear Clear    Specific Gravity, UA 1.026 1.005 - 1.030    pH, UA 5.0 5.0 - 8.5    Protein, UA 2+ (A) Negative mg/dL    Glucose, UA Negative Negative, Normal mg/dL    Ketones, UA Trace (A) Negative mg/dL    Blood, UA Negative Negative unit/L    Bilirubin, UA 1+ (A) Negative mg/dL    Urobilinogen, UA 1.0 0.2, 1.0, Normal mg/dL    Nitrites, UA Negative Negative    Leukocyte Esterase, UA Negative Negative unit/L   Urinalysis, Microscopic    Collection Time: 06/30/23 10:43 PM   Result Value Ref Range    RBC, UA <5 <=5 /HPF    WBC, UA <5 <=5 /HPF    Squamous Epithelial Cells, UA <5 <=5 /HPF    Bacteria, UA None Seen None Seen, Rare, Occasional /HPF   POCT glucose    Collection Time: 06/30/23 11:25 PM   Result Value Ref Range    POCT Glucose 162 (H) 70 - 110 mg/dL   POCT glucose    Collection Time: 07/01/23 12:55 AM   Result Value Ref Range    POCT Glucose 145 (H) 70 - 110 mg/dL   POCT glucose    Collection Time: 07/01/23  3:30 AM   Result Value Ref Range    POCT Glucose 160 (H) 70 - 110 mg/dL   Comprehensive Metabolic Panel (CMP)    Collection Time: 07/01/23  6:11 AM   Result Value Ref Range    Sodium Level 137 136 - 145 mmol/L    Potassium Level 4.6 3.5 - 5.1 mmol/L    Chloride 101 98 - 107 mmol/L    Carbon Dioxide 22 (L) 23 - 31 mmol/L    Glucose Level 181 (H) 82 - 115 mg/dL    Blood Urea Nitrogen 18.4 9.8 - 20.1 mg/dL    Creatinine 1.68 (H) 0.55 - 1.02 mg/dL    Calcium Level Total 8.4 8.4 -  10.2 mg/dL    Protein Total 6.0 5.8 - 7.6 gm/dL    Albumin Level 3.1 (L) 3.4 - 4.8 g/dL    Globulin 2.9 2.4 - 3.5 gm/dL    Albumin/Globulin Ratio 1.1 1.1 - 2.0 ratio    Bilirubin Total 0.5 <=1.5 mg/dL    Alkaline Phosphatase 66 40 - 150 unit/L    Alanine Aminotransferase 5 0 - 55 unit/L    Aspartate Aminotransferase 14 5 - 34 unit/L    eGFR 30 mls/min/1.73/m2   CBC with Differential    Collection Time: 07/01/23  6:11 AM   Result Value Ref Range    WBC 14.95 (H) 4.50 - 11.50 x10(3)/mcL    RBC 3.50 (L) 4.20 - 5.40 x10(6)/mcL    Hgb 9.5 (L) 12.0 - 16.0 g/dL    Hct 30.6 (L) 37.0 - 47.0 %    MCV 87.4 80.0 - 94.0 fL    MCH 27.1 27.0 - 31.0 pg    MCHC 31.0 (L) 33.0 - 36.0 g/dL    RDW 15.0 11.5 - 17.0 %    Platelet 578 (H) 130 - 400 x10(3)/mcL    MPV 10.7 (H) 7.4 - 10.4 fL    Neut % 68.1 %    Lymph % 19.9 %    Mono % 8.4 %    Eos % 1.9 %    Basophil % 1.1 %    Lymph # 2.98 0.6 - 4.6 x10(3)/mcL    Neut # 10.19 (H) 2.1 - 9.2 x10(3)/mcL    Mono # 1.25 0.1 - 1.3 x10(3)/mcL    Eos # 0.28 0 - 0.9 x10(3)/mcL    Baso # 0.16 <=0.2 x10(3)/mcL    IG# 0.09 (H) 0 - 0.04 x10(3)/mcL    IG% 0.6 %    NRBC% 0.0 %   Iron and TIBC    Collection Time: 07/01/23  6:11 AM   Result Value Ref Range    Iron Binding Capacity Unsaturated 150 70 - 310 ug/dL    Iron Level 26 (L) 50 - 170 ug/dL    Transferrin 155 mg/dL    Iron Binding Capacity Total 176 (L) 250 - 450 ug/dL    Iron Saturation 15 (L) 20 - 50 %   Ferritin    Collection Time: 07/01/23  6:11 AM   Result Value Ref Range    Ferritin Level 149.54 4.63 - 204.00 ng/mL   Vitamin B12    Collection Time: 07/01/23  6:11 AM   Result Value Ref Range    Vitamin B12 Level <148 (L) 213 - 816 pg/mL   POCT glucose    Collection Time: 07/01/23 11:26 AM   Result Value Ref Range    POCT Glucose 273 (H) 70 - 110 mg/dL   POCT glucose    Collection Time: 07/01/23  5:33 PM   Result Value Ref Range    POCT Glucose 242 (H) 70 - 110 mg/dL   Respiratory Panel    Collection Time: 07/01/23  5:41 PM   Result Value Ref  Range    Adenovirus Not Detected Not Detected    Coronavirus 229E Not Detected Not Detected    Coronavirus HKU1 Not Detected Not Detected    Coronavirus NL63 Not Detected Not Detected    Coronavirus OC43 PCR, Common Cold Virus Not Detected Not Detected    Human Metapneumovirus Not Detected Not Detected    Parainfluenza Virus 1 Not Detected Not Detected    Parainfluenza Virus 2 Not Detected Not Detected    Parainfluenza Virus 3 Not Detected Not Detected    Parainfluenza Virus 4 Not Detected Not Detected    Bordetella pertussis (ptxP) Not Detected Not Detected    Chlamydia pneumoniae Not Detected Not Detected    Mycoplasma pneumoniae Not Detected Not Detected    Human Rhinovirus/Enterovirus Not Detected Not Detected    Bordetella parapertussis (ES6422) Not Detected Not Detected   POCT glucose    Collection Time: 07/01/23  8:30 PM   Result Value Ref Range    POCT Glucose 290 (H) 70 - 110 mg/dL   POCT glucose    Collection Time: 07/02/23  4:24 AM   Result Value Ref Range    POCT Glucose 277 (H) 70 - 110 mg/dL   Folate    Collection Time: 07/02/23  5:27 AM   Result Value Ref Range    Folate Level 10.5 7.0 - 31.4 ng/mL   Basic Metabolic Panel    Collection Time: 07/02/23  5:27 AM   Result Value Ref Range    Sodium Level 134 (L) 136 - 145 mmol/L    Potassium Level 4.1 3.5 - 5.1 mmol/L    Chloride 99 98 - 107 mmol/L    Carbon Dioxide 22 (L) 23 - 31 mmol/L    Glucose Level 329 (H) 82 - 115 mg/dL    Blood Urea Nitrogen 15.6 9.8 - 20.1 mg/dL    Creatinine 1.38 (H) 0.55 - 1.02 mg/dL    BUN/Creatinine Ratio 11     Calcium Level Total 8.3 (L) 8.4 - 10.2 mg/dL    Anion Gap 13.0 mEq/L    eGFR 39 mls/min/1.73/m2   CBC with Differential    Collection Time: 07/02/23  5:27 AM   Result Value Ref Range    WBC 13.30 (H) 4.50 - 11.50 x10(3)/mcL    RBC 3.70 (L) 4.20 - 5.40 x10(6)/mcL    Hgb 10.1 (L) 12.0 - 16.0 g/dL    Hct 32.3 (L) 37.0 - 47.0 %    MCV 87.3 80.0 - 94.0 fL    MCH 27.3 27.0 - 31.0 pg    MCHC 31.3 (L) 33.0 - 36.0 g/dL    RDW  14.8 11.5 - 17.0 %    Platelet 553 (H) 130 - 400 x10(3)/mcL    MPV 10.1 7.4 - 10.4 fL    Neut % 67.1 %    Lymph % 19.9 %    Mono % 8.0 %    Eos % 3.1 %    Basophil % 1.4 %    Lymph # 2.65 0.6 - 4.6 x10(3)/mcL    Neut # 8.92 2.1 - 9.2 x10(3)/mcL    Mono # 1.07 0.1 - 1.3 x10(3)/mcL    Eos # 0.41 0 - 0.9 x10(3)/mcL    Baso # 0.19 <=0.2 x10(3)/mcL    IG# 0.06 (H) 0 - 0.04 x10(3)/mcL    IG% 0.5 %    NRBC% 0.0 %   POCT glucose    Collection Time: 07/02/23  9:45 AM   Result Value Ref Range    POCT Glucose 393 (H) 70 - 110 mg/dL   POCT glucose    Collection Time: 07/02/23 12:18 PM   Result Value Ref Range    POCT Glucose 472 (HH) 70 - 110 mg/dL   POCT glucose    Collection Time: 07/02/23  4:12 PM   Result Value Ref Range    POCT Glucose 297 (H) 70 - 110 mg/dL   POCT glucose    Collection Time: 07/02/23  8:30 PM   Result Value Ref Range    POCT Glucose 210 (H) 70 - 110 mg/dL   Basic Metabolic Panel    Collection Time: 07/03/23  5:41 AM   Result Value Ref Range    Sodium Level 136 136 - 145 mmol/L    Potassium Level 4.2 3.5 - 5.1 mmol/L    Chloride 100 98 - 107 mmol/L    Carbon Dioxide 23 23 - 31 mmol/L    Glucose Level 166 (H) 82 - 115 mg/dL    Blood Urea Nitrogen 16.0 9.8 - 20.1 mg/dL    Creatinine 1.29 (H) 0.55 - 1.02 mg/dL    BUN/Creatinine Ratio 12     Calcium Level Total 8.1 (L) 8.4 - 10.2 mg/dL    Anion Gap 13.0 mEq/L    eGFR 42 mls/min/1.73/m2   Magnesium    Collection Time: 07/03/23  5:41 AM   Result Value Ref Range    Magnesium Level 0.90 (LL) 1.60 - 2.60 mg/dL   Phosphorus    Collection Time: 07/03/23  5:41 AM   Result Value Ref Range    Phosphorus Level 3.7 2.3 - 4.7 mg/dL   CBC with Differential    Collection Time: 07/03/23  5:42 AM   Result Value Ref Range    WBC 9.97 4.50 - 11.50 x10(3)/mcL    RBC 3.40 (L) 4.20 - 5.40 x10(6)/mcL    Hgb 9.1 (L) 12.0 - 16.0 g/dL    Hct 28.8 (L) 37.0 - 47.0 %    MCV 84.7 80.0 - 94.0 fL    MCH 26.8 (L) 27.0 - 31.0 pg    MCHC 31.6 (L) 33.0 - 36.0 g/dL    RDW 14.8 11.5 - 17.0 %     Platelet 510 (H) 130 - 400 x10(3)/mcL    MPV 10.3 7.4 - 10.4 fL    Neut % 54.6 %    Lymph % 31.7 %    Mono % 8.2 %    Eos % 4.1 %    Basophil % 1.0 %    Lymph # 3.16 0.6 - 4.6 x10(3)/mcL    Neut # 5.44 2.1 - 9.2 x10(3)/mcL    Mono # 0.82 0.1 - 1.3 x10(3)/mcL    Eos # 0.41 0 - 0.9 x10(3)/mcL    Baso # 0.10 <=0.2 x10(3)/mcL    IG# 0.04 0 - 0.04 x10(3)/mcL    IG% 0.4 %    NRBC% 0.0 %     Significant Imaging:  Imaging Results              X-Ray Chest AP Portable (Final result)  Result time 06/30/23 23:02:02      Final result by Zain Duarte MD (06/30/23 23:02:02)                   Impression:      No acute abnormality.      Electronically signed by: Zain Duarte MD  Date:    06/30/2023  Time:    23:02               Narrative:    EXAMINATION:  XR CHEST AP PORTABLE    CLINICAL HISTORY:  Vomiting, unspecified    TECHNIQUE:  Single frontal view of the chest was performed.    COMPARISON:  02/21/2022    FINDINGS:  The lungs are clear, with normal appearance of pulmonary vasculature and no pleural effusion or pneumothorax.    The cardiac silhouette is normal in size. The hilar and mediastinal contours are unremarkable.    Bones are intact.                                      EKG:  No results found for this visit on 06/30/23.    Telemetry:  SR    Physical Exam  Vitals reviewed.   HENT:      Head: Normocephalic.      Nose: Nose normal.      Mouth/Throat:      Mouth: Mucous membranes are moist.   Eyes:      Pupils: Pupils are equal, round, and reactive to light.   Cardiovascular:      Rate and Rhythm: Normal rate and regular rhythm.      Pulses: Normal pulses.      Heart sounds: Normal heart sounds.   Pulmonary:      Effort: Pulmonary effort is normal.      Breath sounds: Normal breath sounds.   Abdominal:      General: Bowel sounds are normal.      Palpations: Abdomen is soft.   Musculoskeletal:      Cervical back: Normal range of motion.      Comments: Left-sided weakness   Skin:     General: Skin is warm and dry.       Findings: Bruising present.   Neurological:      Mental Status: She is alert.      Motor: Weakness present.   Psychiatric:         Mood and Affect: Mood normal.         Behavior: Behavior normal.       Home Medications:   No current facility-administered medications on file prior to encounter.     Current Outpatient Medications on File Prior to Encounter   Medication Sig Dispense Refill    ADULT LOW DOSE ASPIRIN 81 mg EC tablet       ALPRAZolam (XANAX) 0.25 MG tablet Take 1 tablet (0.25 mg total) by mouth daily as needed for Anxiety. 30 tablet 3    atorvastatin (LIPITOR) 80 MG tablet Take 80 mg by mouth every evening.      benzonatate (TESSALON) 200 MG capsule Take 200 mg by mouth 3 (three) times daily as needed for Cough.      clopidogreL (PLAVIX) 75 mg tablet Take 75 mg by mouth once daily.      FLUoxetine 60 mg Tab Take 60 mg by mouth once daily. 90 tablet 1    gabapentin (NEURONTIN) 300 MG capsule Take 1 capsule (300 mg total) by mouth once daily. 30 capsule 3    insulin aspart protamine-insulin aspart (NOVOLOG 70/30) 100 unit/mL (70-30) InPn pen Inject into the skin 3 (three) times daily before meals.      insulin detemir U-100 (LEVEMIR) 100 unit/mL injection Inject into the skin once daily.      rOPINIRole (REQUIP) 1 MG tablet Take 1 tablet (1 mg total) by mouth daily as needed. 90 tablet 1    zolpidem (AMBIEN) 5 MG Tab Take 1 tablet (5 mg total) by mouth nightly as needed. 30 tablet 5    amLODIPine (NORVASC) 5 MG tablet Take 5 mg by mouth once daily.      azelastine (ASTELIN) 137 mcg (0.1 %) nasal spray 2 sprays once daily.      blood-glucose meter,continuous (DEXCOM G6 ) Misc 1 each by Misc.(Non-Drug; Combo Route) route every 14 (fourteen) days. 1 each 11    blood-glucose sensor (DEXCOM G6 SENSOR) Cee 10 each by Misc.(Non-Drug; Combo Route) route every 14 (fourteen) days. 1 each 11    blood-glucose transmitter (DEXCOM G6 TRANSMITTER) Cee 1 each by Misc.(Non-Drug; Combo Route) route every 14  (fourteen) days. 1 each 11    calcium-vitamin D3 (OS-MARIAJOSE 500 + D3) 500 mg-5 mcg (200 unit) per tablet Take 1 tablet by mouth.      CONTOUR NEXT EZ METER Misc use as directed      CONTOUR NEXT TEST STRIPS Strp 1 strip by Misc.(Non-Drug; Combo Route) route 4 (four) times daily. 200 strip 6    diphenoxylate-atropine 2.5-0.025 mg (LOMOTIL) 2.5-0.025 mg per tablet Take 1 tablet by mouth every 6 (six) hours.      doxycycline monohydrate 100 mg Tab SMARTSI Tablet(s) By Mouth Morning-Night      famotidine (PEPCID) 40 MG tablet Take 40 mg by mouth nightly.      flash glucose scanning reader (FREESTYLE ANNE 2 READER) Misc 1 each by Misc.(Non-Drug; Combo Route) route once daily. 1 each 0    flash glucose sensor (FREESTYLE ANNE 2 SENSOR) Kit 1 each by Misc.(Non-Drug; Combo Route) route every 14 (fourteen) days. 4 kit 11    fluticasone propionate (FLONASE) 50 mcg/actuation nasal spray 2 sprays by Each Nostril route once daily.      hydrALAZINE (APRESOLINE) 25 MG tablet taek ONE TABLET BY MOUTH TWICE DAILY      hydrOXYzine HCL (ATARAX) 25 MG tablet Take 1 tablet (25 mg total) by mouth daily as needed. (Patient not taking: Reported on 2023) 30 tablet 5    LEVEMIR FLEXPEN 100 unit/mL (3 mL) InPn pen inject 75 UNITS subcutaneously DAILY 15 mL 3    meloxicam (MOBIC) 7.5 MG tablet Take 1 tablet (7.5 mg total) by mouth once daily. (Patient not taking: Reported on 2023) 14 tablet 0    metFORMIN (GLUCOPHAGE) 500 MG tablet TAKE TWO TABLETS BY MOUTH EVERY MORNING AND TWO TABLETS BY MOUTH AT BEDTIME 120 tablet 12    metoprolol succinate (TOPROL-XL) 50 MG 24 hr tablet TAKE ONE TABLET BY MOUTH TWICE DAILY 60 tablet 4    MICROLET LANCET Misc CHECK BLOOD SUGAR TWICE DAILY      NOVOLOG FLEXPEN U-100 INSULIN 100 unit/mL (3 mL) InPn pen Inject 10 Units into the skin 3 (three) times daily with meals. 3 each 3    nystatin (MYCOSTATIN) cream Apply topically 2 (two) times daily.      olmesartan-hydrochlorothiazide (BENICAR HCT) 40-25 mg  per tablet Take 1 tablet by mouth every morning.      pantoprazole (PROTONIX) 40 MG tablet Take 40 mg by mouth once daily.      promethazine (PHENERGAN) 25 MG tablet Take 1-2 tablets (25-50 mg total) by mouth every 6 (six) hours as needed for Nausea. (Patient not taking: Reported on 6/27/2023) 30 tablet 3    simvastatin (ZOCOR) 10 MG tablet TAKE ONE TABLET BY MOUTH IN THE EVENING (Patient not taking: Reported on 6/27/2023) 90 tablet 1    sucralfate (CARAFATE) 1 gram tablet Take 1 g by mouth 4 (four) times daily.      triamcinolone acetonide 0.1% (KENALOG) 0.1 % cream Apply topically nightly.       Current Inpatient Medications:  Current Facility-Administered Medications:     acetaminophen tablet 1,000 mg, 1,000 mg, Oral, Q6H PRN, MARY CancholaP, 1,000 mg at 07/01/23 1443    acetaminophen tablet 650 mg, 650 mg, Oral, Q4H PRN, OSMAR Canchola    ALPRAZolam tablet 0.25 mg, 0.25 mg, Oral, Daily PRN, Andreea Cole MD    amLODIPine tablet 10 mg, 10 mg, Oral, Daily, Andreea Cole MD, 10 mg at 07/03/23 0810    ampicillin-sulbactam (UNASYN) 3 g in sodium chloride 0.9 % 100 mL IVPB (MB+), 3 g, Intravenous, Q12H, Jerome Tabor MD, Stopped at 07/03/23 0239    atorvastatin tablet 40 mg, 40 mg, Oral, QHS, Andreea Cole MD, 40 mg at 07/02/23 2033    B12-levomefolate calcium-B6 (FOLBIC RF) 2-1.13-25 mg tablet 1 tablet, 1 tablet, Oral, Daily, Jerome Tabor MD, 1 tablet at 07/03/23 0810    butalbital-acetaminophen-caffeine -40 mg per tablet 1 tablet, 1 tablet, Oral, Q4H PRN, Andreea Cole MD, 1 tablet at 07/01/23 1917    cloNIDine 0.2 mg/24 hr td ptwk 1 patch, 1 patch, Transdermal, Q7 Days, Andreea Cole MD, 1 patch at 07/01/23 1435    clopidogreL tablet 75 mg, 75 mg, Oral, Daily, Andreea Cole MD, 75 mg at 07/03/23 0810    diphenhydrAMINE capsule 25 mg, 25 mg, Oral, Q6H PRN, Deep Henley, FNP, 25 mg at 07/02/23 2034    enoxaparin injection 40 mg, 40 mg, Subcutaneous, Daily,  Deep Henley, FNP, 40 mg at 07/02/23 1613    gabapentin capsule 100 mg, 100 mg, Oral, QHS, Jerome Tabor MD, 100 mg at 07/02/23 2034    hydrALAZINE injection 20 mg, 20 mg, Intravenous, Q8H PRN, Andreea Cole MD    hydrALAZINE tablet 50 mg, 50 mg, Oral, Q8H, Andreea Cole MD, 50 mg at 07/03/23 0542    HYDROcodone-acetaminophen 5-325 mg per tablet 1 tablet, 1 tablet, Oral, Q6H PRN, Andreea Cole MD, 1 tablet at 07/02/23 2042    insulin aspart U-100 injection 0-5 Units, 0-5 Units, Subcutaneous, QID (AC + HS) PRN, Deep Henley, FNBARBARA, 3 Units at 07/02/23 1624    insulin detemir U-100 injection 20 Units, 20 Units, Subcutaneous, BID, Jerome Tabor MD, 20 Units at 07/02/23 2033    lactated ringers infusion, , Intravenous, Continuous, Jerome Tabor MD, Last Rate: 75 mL/hr at 07/03/23 0811, New Bag at 07/03/23 0811    magnesium oxide tablet 400 mg, 400 mg, Oral, BID, Jerome Tabor MD    magnesium sulfate 2g in water 50mL IVPB (premix), 4 g, Intravenous, Once, Jerome Tabor MD, Last Rate: 50 mL/hr at 07/03/23 0810, 4 g at 07/03/23 0810    megestroL 400 mg/10 mL (10 mL) suspension 400 mg, 400 mg, Oral, Daily, Andreea Cole MD, 400 mg at 07/02/23 0947    melatonin tablet 6 mg, 6 mg, Oral, Nightly PRN, Jerome Tabor MD, 6 mg at 07/02/23 2033    metoprolol succinate (TOPROL-XL) 24 hr tablet 50 mg, 50 mg, Oral, Daily, Andreea Cole MD, 50 mg at 07/03/23 0810    naloxone 0.4 mg/mL injection 0.02 mg, 0.02 mg, Intravenous, PRN, OSMAR Canchola    ondansetron injection 4 mg, 4 mg, Intravenous, Q4H PRN, OSMAR Canchola, 4 mg at 07/01/23 0426    pantoprazole EC tablet 40 mg, 40 mg, Oral, Daily, Jerome Tabor MD, 40 mg at 07/03/23 0810    prochlorperazine injection Soln 5 mg, 5 mg, Intravenous, Q6H PRN, OSMAR Canchola    QUEtiapine tablet 25 mg, 25 mg, Oral, After dinner, Jerome Tabor MD, 25 mg at 07/02/23 1832    sodium chloride 0.9% flush 10 mL, 10 mL,  Intravenous, PRN, Deep Henley, OSMAR    thiamine tablet 100 mg, 100 mg, Oral, BID, Jerome Tabor MD, 100 mg at 07/03/23 0811    VTE Risk Mitigation (From admission, onward)           Ordered     enoxaparin injection 40 mg  Daily         07/01/23 0246     IP VTE HIGH RISK PATIENT  Once         07/01/23 0224     Place sequential compression device  Until discontinued         07/01/23 0224                  Assessment:   SVT  Hypomagnesemia  --Mag 0.9 (7.3.23)  --repeat Mag 2.8 after 4gm, resolved  Gastritis  Dysphagia  Suspected Aspiration PNA  Leukocytosis  VERNON on CKD  CVA (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     I, Magi Augustin RN, transcribed this report in the presence of Kartik Suarez MD.     Magi Augustin RN  Cardiology  [facility] - 5th Floor Med Surg  07/03/2023 8:36 AM

## 2023-07-03 NOTE — PROVATION PATIENT INSTRUCTIONS
Discharge Summary/Instructions after an Endoscopic Procedure  Patient Name: Denise Oleary  Patient MRN: [id number]  Patient YOB: 1940  Monday, July 3, 2023  Aayush Byrd MD  Dear patient,  As a result of recent federal legislation (The Federal Cures Act), you may   receive lab or pathology results from your procedure in your [facility]   account before your physician is able to contact you. Your physician or   their representative will relay the results to you with their   recommendations at their soonest availability.  Thank you,  RESTRICTIONS:  During your procedure today, you received medications for sedation.  These   medications may affect your judgment, balance and coordination.  Therefore,   for 24 hours, you have the following restrictions:   - DO NOT drive a car, operate machinery, make legal/financial decisions,   sign important papers or drink alcohol.    ACTIVITY:  Today: no heavy lifting, straining or running due to procedural   sedation/anesthesia.  The following day: return to full activity including work.  DIET:  Eat and drink normally unless instructed otherwise.     TREATMENT FOR COMMON SIDE EFFECTS:  - Mild abdominal pain, nausea, belching, bloating or excessive gas:  rest,   eat lightly and use a heating pad.  - Sore Throat: treat with throat lozenges and/or gargle with warm salt   water.  - Because air was used during the procedure, expelling large amounts of air   from your rectum or belching is normal.  - If a bowel prep was taken, you may not have a bowel movement for 1-3 days.    This is normal.  SYMPTOMS TO WATCH FOR AND REPORT TO YOUR PHYSICIAN:  1. Abdominal pain or bloating, other than gas cramps.  2. Chest pain.  3. Back pain.  4. Signs of infection such as: chills or fever occurring within 24 hours   after the procedure.  5. Rectal bleeding, which would show as bright red, maroon, or black stools.   (A tablespoon of blood from the rectum is not serious, especially if    hemorrhoids are present.)  6. Vomiting.  7. Weakness or dizziness.  GO DIRECTLY TO THE NEAREST EMERGENCY ROOM IF YOU HAVE ANY OF THE FOLLOWING:      Difficulty breathing              Chills and/or fever over 101 F   Persistent vomiting and/or vomiting blood   Severe abdominal pain   Severe chest pain   Black, tarry stools   Bleeding- more than one tablespoon   Any other symptom or condition that you feel may need urgent attention  Your doctor recommends these additional instructions:  If any biopsies were taken, your doctors clinic will contact you in 1 to 2   weeks with any results.  - Return patient to hospital [facility] for ongoing care.   - Resume previous diet today.   - Continue present medications.   - Observe patient's clinical course.   - Await pathology results.   - UGI with SBFT can be consider to r/o any downstream structural   abnormalities. No further GI recs at this time.   - The findings and recommendations were discussed with the patient and their   spouse.  For questions, problems or results please call your physician - Aayush Byrd MD at Work:  [phone].  [facility], EMERGENCY ROOM PHONE NUMBER: [phone]  IF A COMPLICATION OR EMERGENCY SITUATION ARISES AND YOU ARE UNABLE TO REACH   YOUR PHYSICIAN - GO DIRECTLY TO THE EMERGENCY ROOM.  Aayush Byrd MD  7/3/2023 4:49:00 PM  This report has been verified and signed electronically.  Dear patient,  As a result of recent federal legislation (The Federal Cures Act), you may   receive lab or pathology results from your procedure in your [facility]   account before your physician is able to contact you. Your physician or   their representative will relay the results to you with their   recommendations at their soonest availability.  Thank you,  PROVATION

## 2023-07-03 NOTE — ANESTHESIA PREPROCEDURE EVALUATION
07/03/2023  Denise Oleary is a 83 y.o., female for EGD sec NV.     7/2/2023 Hospitalist MD:  Nausea and vomiting improved since admission and tolerating soft and bite sized diet  however reports this has been going on  on and off for more than a year now . Pt had EUS done in 5/2020 showed diffuse dilation of main pancreatic duct up to 11mm with pancreatic atrophy, no evidence of pancreatic mass, area of pancreatitc duct transition in head of gland, FNA - negative for malignant cells. She is under surveillance with Dr. Ketan Shepard.  some hallucination last night , pt was seeing some kids playing in her room. Pt is able to recall her hallucination. Noted pt is on Ambien at night.    Pre-operative evaluation for Procedure(s) (LRB):  EGD (N/A)    Denise Oleary is a 83 y.o. female  With acute CVA :   infarct of the right MCA distribution as well as right GERTRUDIS distribution. Changes of microangiopathy as well as age-appropriate volume loss.    Card NP Notes 7/3/2023:   HTN, HLD, DM II, RLS, GERD, CVA (6.21.23), and CKD, who is known to [facility], Dr. Gaffney. She presented to the ER on 6.30.23 with Gastritis, Aspiration PNA, and Dysphagia. On 7.2.23, she started having episodes of SVT. Significant labs include H&H 9.1/28.8, Plts 510, Creat 1.29, Glucose 166, and Mag 0.90. [facility] is consulted for suspected SVT. PFO/ASD. PHTN=52.       Patient Active Problem List   Diagnosis    Restless leg syndrome    Hypertension    Mixed hyperlipidemia    Type 2 diabetes mellitus with stage 3b chronic kidney disease, with long-term current use of insulin    Anxiety and depression    Stage 3b chronic kidney disease    Gastroesophageal reflux disease without esophagitis    Abnormal laboratory test    Class 2 severe obesity due to excess calories with serious comorbidity and body mass index (BMI) of 35.0 to 35.9 in adult    Medicare  annual wellness visit, subsequent       Review of patient's allergies indicates:  No Known Allergies    No current facility-administered medications on file prior to encounter.     Current Outpatient Medications on File Prior to Encounter   Medication Sig Dispense Refill    ADULT LOW DOSE ASPIRIN 81 mg EC tablet       ALPRAZolam (XANAX) 0.25 MG tablet Take 1 tablet (0.25 mg total) by mouth daily as needed for Anxiety. 30 tablet 3    atorvastatin (LIPITOR) 80 MG tablet Take 80 mg by mouth every evening.      benzonatate (TESSALON) 200 MG capsule Take 200 mg by mouth 3 (three) times daily as needed for Cough.      clopidogreL (PLAVIX) 75 mg tablet Take 75 mg by mouth once daily.      FLUoxetine 60 mg Tab Take 60 mg by mouth once daily. 90 tablet 1    gabapentin (NEURONTIN) 300 MG capsule Take 1 capsule (300 mg total) by mouth once daily. 30 capsule 3    insulin aspart protamine-insulin aspart (NOVOLOG 70/30) 100 unit/mL (70-30) InPn pen Inject into the skin 3 (three) times daily before meals.      insulin detemir U-100 (LEVEMIR) 100 unit/mL injection Inject into the skin once daily.      rOPINIRole (REQUIP) 1 MG tablet Take 1 tablet (1 mg total) by mouth daily as needed. 90 tablet 1    zolpidem (AMBIEN) 5 MG Tab Take 1 tablet (5 mg total) by mouth nightly as needed. 30 tablet 5    amLODIPine (NORVASC) 5 MG tablet Take 5 mg by mouth once daily.      azelastine (ASTELIN) 137 mcg (0.1 %) nasal spray 2 sprays once daily.      blood-glucose meter,continuous (DEXCOM G6 ) Misc 1 each by Misc.(Non-Drug; Combo Route) route every 14 (fourteen) days. 1 each 11    blood-glucose sensor (DEXCOM G6 SENSOR) Cee 10 each by Misc.(Non-Drug; Combo Route) route every 14 (fourteen) days. 1 each 11    blood-glucose transmitter (DEXCOM G6 TRANSMITTER) Cee 1 each by Misc.(Non-Drug; Combo Route) route every 14 (fourteen) days. 1 each 11    calcium-vitamin D3 (OS-MARIAJOSE 500 + D3) 500 mg-5 mcg (200 unit) per tablet Take 1  tablet by mouth.      CONTOUR NEXT EZ METER Misc use as directed      CONTOUR NEXT TEST STRIPS Strp 1 strip by Misc.(Non-Drug; Combo Route) route 4 (four) times daily. 200 strip 6    diphenoxylate-atropine 2.5-0.025 mg (LOMOTIL) 2.5-0.025 mg per tablet Take 1 tablet by mouth every 6 (six) hours.      doxycycline monohydrate 100 mg Tab SMARTSI Tablet(s) By Mouth Morning-Night      famotidine (PEPCID) 40 MG tablet Take 40 mg by mouth nightly.      flash glucose scanning reader (FREESTYLE ANNE 2 READER) Misc 1 each by Misc.(Non-Drug; Combo Route) route once daily. 1 each 0    flash glucose sensor (FREESTYLE ANNE 2 SENSOR) Kit 1 each by Misc.(Non-Drug; Combo Route) route every 14 (fourteen) days. 4 kit 11    fluticasone propionate (FLONASE) 50 mcg/actuation nasal spray 2 sprays by Each Nostril route once daily.      hydrALAZINE (APRESOLINE) 25 MG tablet taek ONE TABLET BY MOUTH TWICE DAILY      hydrOXYzine HCL (ATARAX) 25 MG tablet Take 1 tablet (25 mg total) by mouth daily as needed. (Patient not taking: Reported on 2023) 30 tablet 5    LEVEMIR FLEXPEN 100 unit/mL (3 mL) InPn pen inject 75 UNITS subcutaneously DAILY 15 mL 3    meloxicam (MOBIC) 7.5 MG tablet Take 1 tablet (7.5 mg total) by mouth once daily. (Patient not taking: Reported on 2023) 14 tablet 0    metFORMIN (GLUCOPHAGE) 500 MG tablet TAKE TWO TABLETS BY MOUTH EVERY MORNING AND TWO TABLETS BY MOUTH AT BEDTIME 120 tablet 12    metoprolol succinate (TOPROL-XL) 50 MG 24 hr tablet TAKE ONE TABLET BY MOUTH TWICE DAILY 60 tablet 4    MICROLET LANCET [facility] CHECK BLOOD SUGAR TWICE DAILY      NOVOLOG FLEXPEN U-100 INSULIN 100 unit/mL (3 mL) InPn pen Inject 10 Units into the skin 3 (three) times daily with meals. 3 each 3    nystatin (MYCOSTATIN) cream Apply topically 2 (two) times daily.      olmesartan-hydrochlorothiazide (BENICAR HCT) 40-25 mg per tablet Take 1 tablet by mouth every morning.      pantoprazole (PROTONIX) 40 MG  tablet Take 40 mg by mouth once daily.      promethazine (PHENERGAN) 25 MG tablet Take 1-2 tablets (25-50 mg total) by mouth every 6 (six) hours as needed for Nausea. (Patient not taking: Reported on 6/27/2023) 30 tablet 3    simvastatin (ZOCOR) 10 MG tablet TAKE ONE TABLET BY MOUTH IN THE EVENING (Patient not taking: Reported on 6/27/2023) 90 tablet 1    sucralfate (CARAFATE) 1 gram tablet Take 1 g by mouth 4 (four) times daily.      triamcinolone acetonide 0.1% (KENALOG) 0.1 % cream Apply topically nightly.         Past Surgical History:   Procedure Laterality Date    Excision of Pancreas      Excision of Stomach      Gastrointestinal Biopsy      HEMORRHOID SURGERY      TOTAL KNEE ARTHROPLASTY      TUBAL LIGATION         CBC:   Recent Labs     07/02/23  0527 07/03/23  0542   WBC 13.30* 9.97   RBC 3.70* 3.40*   HGB 10.1* 9.1*   HCT 32.3* 28.8*   * 510*   MCV 87.3 84.7   MCH 27.3 26.8*   MCHC 31.3* 31.6*       CMP: eGFR sl low= 42 ; Mg=2.8 post replacement from 0.9.  Recent Labs     06/30/23 2112 07/01/23  0611 07/02/23  0527 07/03/23  0541    137 134* 136   K 3.7 4.6 4.1 4.2   CO2 22* 22* 22* 23   BUN 20.1 18.4 15.6 16.0   CREATININE 1.82* 1.68* 1.38* 1.29*   MG  --   --   --  0.90*   PHOS  --   --   --  3.7   CALCIUM 9.2 8.4 8.3* 8.1*   ALBUMIN 3.1* 3.1*  --   --    ALKPHOS 68 66  --   --    ALT 6 5  --   --    AST 14 14  --   --    BILITOT 0.4 0.5  --   --        INR  No results for input(s): PT, INR, PROTIME, APTT in the last 72 hours.    Diagnostic Studies:  CXR : NAPD    EKG 7/3/2023 :        Monitor Strip 7/3/2023: HR=80.         PET 10/27/2022: N prefusion study.,No defects. No ischemia. EF=66%.   ECHO (6.21.23): EF=55%. Moderate concentric left ventricular hypertrophy with diastolic Dysfxn. Trivial MR. Mild TR with RVSP=  52 mmHg: PHTN. Positive bubble crossover consistent with ASD or PFO with right to left shunt.   Carotid US 10/27/2022: 1-39% Bilat.      Pre-op Assessment    I have  reviewed the Patient Summary Reports.    I have reviewed the NPO Status.   I have reviewed the Medications.     Review of Systems  Anesthesia Hx:  No problems with previous Anesthesia  History of prior surgery of interest to airway management or planning: Previous anesthesia: General 5/19/2023 CLosed Reduction R ankle Fx: EM, DL Mil2,Gr1,x1,ET7.5; ; Exc pancreas:;  TKR: ; Exc Stomach: ; Hemmorhoidect: ; BTL: ; with general anesthesia.  Airway issues documented on chart review include mask, easy, GETA, easy direct laryngoscopy , view on direct laryngoscopy Grade I  Denies Family Hx of Anesthesia complications.   Denies Personal Hx of Anesthesia complications.   Social:  Non-Smoker    Hematology/Oncology:  Hematology Normal   Oncology Normal   Hematology Comments: Plavix    EENT/Dental:   chronic allergic rhinitis   Cardiovascular:   Hypertension Valvular problems/Murmurs, MR Dysrhythmias hyperlipidemia New onset SVT:   PET 10/27/2022: N prefusion study.,No defects. No ischemia. EF=66%.   ECHO (6.21.23): EF=55%. Moderate concentric left ventricular hypertrophy with diastolic Dysfxn. Trivial MR. Mild TR with RVSP=  52 mmHg: PHTN. Positive bubble crossover consistent with ASD or PFO with right to left shunt.   Carotid US 10/27/2022: 1-39% Bilat.   Disorder of Cardiac Conduction, Intraventricular Conduct Defect, Left Anterior Hemiblock, Incomplete (RBBB)    Pulmonary:  Pulmonary Normal Aspiration pneumonia on Abx   Renal/:   Chronic Renal Disease, CKD CKD 3B.   Hepatic/GI:   GERD On PPI:    Musculoskeletal:  Musculoskeletal Normal    Neurological:   CVA, residual symptoms RLS. Acute Recent 6/21/2023 CVA:  L sided deficit.  CT Brain:  infarct of the right MCA distribution as well as right GERTRUDIS distribution.  Changes of microangiopathy as well as age-appropriate volume loss.  US Carotids 10/27/2022: BICA 1-39%.   Endocrine:   Diabetes, type 2, using insulin  Obesity / BMI > 30  Dermatological:  Skin Normal    Psych:    Psychiatric History anxiety depression          Physical Exam  General: Cooperative and Alert    Airway:  Mallampati: III / II  Mouth Opening: Normal  TM Distance: Normal  Tongue: Normal  Neck ROM: Normal ROM    Dental:  Intact  Px denies any loose teeth.  Heart:  Rate: Normal  Rhythm: Regular Rhythm        Anesthesia Plan  Type of Anesthesia, risks & benefits discussed:    Anesthesia Type: Gen Natural Airway  Intra-op Monitoring Plan: Standard ASA Monitors  Induction:  IV  Informed Consent: Informed consent signed with the Patient and all parties understand the risks and agree with anesthesia plan.  All questions answered.   ASA Score: 4 Emergent  Day of Surgery Review of History & Physical: H&P Update referred to the surgeon/provider.I have interviewed and examined the patient. I have reviewed the patient's H&P dated:   Anesthesia Plan Notes: TIVA with mask/NC, GA back-up.     Ready For Surgery From Anesthesia Perspective.     .

## 2023-07-04 NOTE — PT/OT/SLP PROGRESS
Physical Therapy      Patient Name:  Denise Oleary   MRN:  [id number]    Patient not seen today secondary to just getting back to bed with nsg and having a HA. PT will follow up tomorrow.

## 2023-07-04 NOTE — PROGRESS NOTES
[facility] Hospital Medicine Progress Note        Chief Complaint: Inpatient Follow-up for  persistent nausea and vomiting     HPI:   Denise Oleary is a 83 y.o. female with a past medical history of essential hypertension, hyperlipidemia, CKD stage IIIB, recent CVA with left-sided deficits, diabetes mellitus type 2, GERD, anxiety, and depression presented to [facility] on 6/30/2023 for nausea and vomiting.  Patient reports nausea and vomiting began 5 days ago.  Patient also endorsed difficulty swallowing.  Family reported patient was recently diagnosed with CVA on 7/21/2023 and was inpatient at [facility] with discharge on 6/25/2023.   Initial vital signs in ED were /58, pulse 83, respirations 16, temperature 36.9° C, and SpO2 95% on room air.  Labs revealed WBC 15.93, RBC 3.87, hemoglobin 10.6, hematocrit 33.2, platelets 589, CO2 22, BUN 20.1, creatinine 1.82, and glucose 47.  UA revealed 2+ protein and trace ketones.  Chest x-ray revealed no acute abnormality.  Patient was given D10 bolus in ED with improvement of glucose.  Patient was given IM Phenergan with some improvement of nausea.  Patient was admitted to hospital medicine service for further medical management.     Nausea and vomiting improved since admission and tolerating soft and bite sized diet  however reports this has been going on on and off for more than a year now . Pt had EUS done in 5/2020 showed diffuse dilation of main pancreatic duct up to 11mm with pancreatic atrophy, no evidence of pancreatic mass, area of pancreatitc duct transition in head of gland, FNA - negative for malignant cells. She is under surveillance with Dr. Ketan Shepard. GI suggested EGD during this admission to further evaluate recurrent N/V showed  normal esophagus, mild erythema gastric antrum, no bleeding, biopsy taken, normal duodenum. No GI cause for N/V found. Small bowel follow thru ordered per GI. Completed Unasyn x 3 days for presumed  aspiration pneumonitis with normalization of WBC. H&H remained fairly stable . Creatinine slowly improved with IVF replacement. Episode of hallucination reported at night. Noted pt was taking Ambien. Seroquel started in place of Ambien with improvement of hallucination. PT/OT/SLP consulted given recent stroke with left hemiparesis . Additionally pt had Rt ankle fracture sec to fall  s/p ORIF on 5/2023. SLP cleared pt for soft, bite sized diet. PT/OT suggested SNF placement.           Interval Hx:   S/P EGD yesterday showed normal esophagus, mild erythema gastric antrum, no bleeding, biopsy taken, normal duodenum. No GI cause for N/V found    No further N/V since admission. C/O headache today. No associated symptoms. Otherwise feels ok.     BP is labile   Labs showed WBC normalized, Hgb 8.9>9.1, Cr 1.3, Mg 1.9    Awaiting SNF placement     Case was discussed with patient's nurse and  on the floor.    Objective/physical exam:    General: In no acute distress, afebrile  Chest: Clear to auscultation bilaterally  Heart: RRR, +S1, S2, no appreciable murmur  Abdomen: Soft, nontender, BS +  MSK: Warm, no lower extremity edema, no clubbing or cyanosis, (+) bruise . Bandage noted Rt ankle where she had surgery 2 mos ago.   Neurologic: Currently awake, alert, oriented x 3, Cranial nerve II-XII intact except VII, Left tess paresis       VITAL SIGNS: 24 HRS MIN & MAX LAST   Temp  Min: 97.5 °F (36.4 °C)  Max: 99 °F (37.2 °C) 97.5 °F (36.4 °C)   BP  Min: 119/69  Max: 178/77 (!) 163/67   Pulse  Min: 81  Max: 86  81   Resp  Min: 17  Max: 20 18   SpO2  Min: 94 %  Max: 99 % 97 %     I have reviewed the following labs:    Recent Labs   Lab 07/02/23  0527 07/03/23  0542 07/04/23  0445   WBC 13.30* 9.97 8.42   RBC 3.70* 3.40* 3.26*   HGB 10.1* 9.1* 8.9*   HCT 32.3* 28.8* 27.7*   MCV 87.3 84.7 85.0   MCH 27.3 26.8* 27.3   MCHC 31.3* 31.6* 32.1*   RDW 14.8 14.8 14.8   * 510* 452*   MPV 10.1 10.3 10.6*       Recent Labs    Lab 06/30/23  2112 07/01/23  0611 07/02/23  0527 07/03/23  0541 07/03/23  1258 07/04/23  0445    137 134* 136  --  136   K 3.7 4.6 4.1 4.2  --  3.6   CO2 22* 22* 22* 23  --  23   BUN 20.1 18.4 15.6 16.0  --  17.0   CREATININE 1.82* 1.68* 1.38* 1.29*  --  1.33*   CALCIUM 9.2 8.4 8.3* 8.1*  --  8.4   MG  --   --   --  0.90* 2.80* 1.90   ALBUMIN 3.1* 3.1*  --   --   --   --    ALKPHOS 68 66  --   --   --   --    ALT 6 5  --   --   --   --    AST 14 14  --   --   --   --    BILITOT 0.4 0.5  --   --   --   --           Microbiology Results (last 7 days)       ** No results found for the last 168 hours. **             See below for Radiology    Scheduled Med:   acetaminophen  650 mg Oral Q6H    amLODIPine  10 mg Oral Daily    atorvastatin  40 mg Oral QHS    B12-levomefolate calcium-B6  1 tablet Oral Daily    cloNIDine 0.2 mg/24 hr td ptwk  1 patch Transdermal Q7 Days    clopidogreL  75 mg Oral Daily    collagenase   Topical (Top) Daily    enoxparin  40 mg Subcutaneous Daily    gabapentin  100 mg Oral QHS    hydrALAZINE  50 mg Oral Q8H    insulin detemir U-100  20 Units Subcutaneous BID    magnesium oxide  400 mg Oral Daily    megestroL  400 mg Oral Daily    metoprolol succinate  50 mg Oral Daily    pantoprazole  40 mg Oral Daily    QUEtiapine  25 mg Oral After dinner    thiamine  100 mg Oral BID        Continuous Infusions:   lactated ringers 100 mL/hr at 07/04/23 1619        PRN Meds:  acetaminophen, acetaminophen, ALPRAZolam, butalbital-acetaminophen-caffeine -40 mg, diphenhydrAMINE, hydrALAZINE, HYDROcodone-acetaminophen, insulin aspart U-100, melatonin, naloxone, ondansetron, prochlorperazine, sodium chloride 0.9%       Assessment/Plan:  Intractable Nausea and Vomiting   Acute kidney injury on CKD III, pre renal due to IVVD  Metabolic acidosis , mild   Leukocytosis - resolved   Presumed aspiration pneumonitis in the setting of recurrent vomiting   Normochromic anemia - stable   Thrombocytosis , reactive    Severe vitamin B12 deficiency   Diabetes Mellitus, type 2- uncontrolled due to hypo and hyperglycemia   Recent  Rt  MCA/ GERTRUDIS distribution stroke with left sided deficit - 6/21/23  Recent Rt ankle fracture sec to fall  s/p ORIF - 5/2023  Hallucination / Delirium - improved   Severe Hypomagnesemia - 7/3/23     Hx- Essential HTN, HLD, CKD IIIB, GERD, anxiety/ Depression      Plan-   S/P EGD yesterday   Continue IVF resuscitation , monitor renal parameter, renal recovery and UOP  Completed  antibiotic targeting Aspiration pneumonia x 3 days   Monitor clinical course      Monitor blood cell counts   Replace B12  WBC normalized   Hgb fairly stable   Creatinine is stable at 1.3  Continue additional IVF today   Reassess in AM  Replete electrolytes as indicated      Accu check AC/HS and cover with sliding scale insulin and add basal insulin      PT/OT/ SLP consulted      Avoid hypnotics and sedative   Ambien discontinued   Seroquel added nightly   CT head with no acute intracranial process except evidence of recent stroke      Continue supportive treatment with anti emetics and others     VTE prophylaxis: Lovenox     Patient condition:  Fair     Anticipated discharge and Disposition:     SNF placement     All diagnosis and differential diagnosis have been reviewed; assessment and plan has been documented; I have personally reviewed the labs and test results that are presently available; I have reviewed the patients medication list; I have reviewed the consulting providers response and recommendations. I have reviewed or attempted to review medical records based upon their availability    All of the patient's questions have been  addressed and answered. Patient's is agreeable to the above stated plan. I will continue to monitor closely and make adjustments to medical management as needed.  _____________________________________________________________________    Nutrition Status:    Radiology:  I have personally reviewed the  following imaging and agree with the radiologist.     CT Head Without Contrast  Narrative: EXAMINATION:  CT HEAD WITHOUT CONTRAST    CLINICAL HISTORY:  Stroke, follow up;Mental status change, unknown cause;    TECHNIQUE:  Low dose axial CT images obtained throughout the head without intravenous contrast. Sagittal and coronal reconstructions were performed.  An automated dose exposure technique was utilized this limits radiation does the patient.    COMPARISON:  06/03/2016    FINDINGS:  Intracranial compartment:    Ventricles and sulci are normal in size for age without evidence of hydrocephalus. No extra-axial blood or fluid collections.    The brain parenchyma appears normal. No parenchymal mass, hemorrhage, edema or major vascular distribution infarct.    Skull/extracranial contents (limited evaluation): No fracture. Mastoid air cells and paranasal sinuses are essentially clear.  Impression: No acute intracranial abnormality.  Findings consistent with infarct of the right MCA distribution as well as right GERTRUDIS distribution.  Changes of microangiopathy as well as age-appropriate volume loss.    Electronically signed by: Zain Duarte MD  Date:    07/02/2023  Time:    14:10  X-Ray Ankle Complete Right  Narrative: EXAMINATION:  XR ANKLE COMPLETE 3 VIEW RIGHT    CLINICAL HISTORY:  Rt ankle fracture;    TECHNIQUE:  Three views    COMPARISON:  May 31, 2023    FINDINGS:  There has been ORIF of the fractured distal fibula with placement of plate and screws.  Position and alignment is satisfactory.  Calcaneal enthesophyte.  Impression: As above.    Electronically signed by: Mahendra Addison  Date:    07/02/2023  Time:    11:59  X-Ray Abdomen AP 1 View  Narrative: EXAMINATION:  XR ABDOMEN AP 1 VIEW    CLINICAL HISTORY:  vomiting;    TECHNIQUE:  One view    COMPARISON:  None available    FINDINGS:  The intestinal gas pattern is nonspecific and nonobstructive. No air fluid levels or pneumoperitoneum identified. No convincing  organomegaly.  Impression: Nonspecific bowel gas pattern.    Electronically signed by: Mahendra Addison  Date:    07/02/2023  Time:    09:46      Jerome Tabor MD   07/04/2023

## 2023-07-04 NOTE — PROGRESS NOTES
[facility] - 5th Floor Med Surg  Cardiology  Progress Note    Patient Name: Denise Oleary  MRN: [id number]  Admission Date: 6/30/2023  Hospital Length of Stay: 3 days  Code Status: Full Code   Attending Physician: Jerome Tabor MD   Primary Care Physician: Yan Ferro MD  Expected Discharge Date:   Principal Problem:<principal problem not specified>    Subjective:   Chief Complaint: Reason for Consult: SVT      HPI: Ms. Oleary is a 84 y/o female with a history of HTN, HLD, DM II, RLS, GERD, CVA (6.21.23), and CKD, who is known to [facility], Dr. Gaffney. She presented to the ER on 6.30.23 with Gastritis, Aspiration PNA, and Dysphagia. On 7.2.23, she started having episodes of SVT. Significant labs include H&H 9.1/28.8, Plts 510, Creat 1.29, Glucose 166, and Mag 0.90. [facility] is consulted for suspected SVT.     Hospital Course:   7.4.23: SR. Vitals Stable.      PMH: HTN, HLD, DM II, RLS, GERD, CVA (6.21.23), MANJULA, CKD, Obesity, Insomnia, Anxiety, Depression,    PSH: Pancreas Excision, Stomach Excision, Gastrointestinal Biopsy, Hemorrhoid surgery, Total Knee Arthroplasty, Tubal Ligation  Family History: Father - HTN; Mother - Heart problem  Social History: Hugh smoking, alcohol use, and illicit drug use.     Previous Cardiac Diagnostics:   ECHO (6.21.23):  Normal left ventricular size and function. EF of 55%. Moderate concentric left ventricular hypertrophy with diastolic   Dysfunction.Trivial mitral regurgitation. Mild tricuspid regurgitation with estimated right ventricular systolic   pressure 52 mmHg consistent with pulmonary hypertension. Positive bubble crossover consistent with ASD or PFO with right to left shunt.      PET (10.27.22):  This is a normal perfusion study, no perfusion defects noted. There is no evidence of ischemia. This scan is suggestive of low risk for future cardiovascular events. The left ventricular cavity is noted to be normal on the stress studies. The stress left ventricular  ejection fraction was calculated to be 66% and left ventricular global function is normal. The rest left ventricular cavity is noted to be normal. The rest left ventricular ejection fraction was calculated to be 64% and rest left ventricular global function is normal. When compared to the resting ejection fraction (64%), the stress ejection fraction (66%) has increased. The study quality is excellent. There was a rise in myocardial blood flow between rest and stress.  Global myocardial blood flow reserve was 2.20.  Normal global coronary flow reserve is suggestive of low coronary event risk.     Carotid US (10.27.22):   The study quality is average. 1-39% stenosis in the proximal right internal carotid artery based on Bluth Criteria. 1-39% stenosis in the proximal left internal carotid artery based on Bluth Criteria. Antegrade right vertebral artery flow. Antegrade left vertebral artery flow.      Review of patient's allergies indicates:  No Known Allergies     Review of Systems   Cardiovascular:  Negative for chest pain.   Respiratory:  Negative for shortness of breath.    All other systems reviewed and are negative.    Objective:     Vital Signs (Most Recent):  Temp: 98 °F (36.7 °C) (07/04/23 0722)  Pulse: 84 (07/04/23 0722)  Resp: 17 (07/04/23 0722)  BP: 119/69 (07/04/23 0722)  SpO2: (!) 94 % (07/04/23 0722) Vital Signs (24h Range):  Temp:  [97.2 °F (36.2 °C)-98.3 °F (36.8 °C)] 98 °F (36.7 °C)  Pulse:  [82-90] 84  Resp:  [16-28] 17  SpO2:  [94 %-99 %] 94 %  BP: (106-162)/(55-77) 119/69     Weight: 81.6 kg (180 lb)  Body mass index is 32.92 kg/m².    SpO2: (!) 94 %         Intake/Output Summary (Last 24 hours) at 7/4/2023 0912  Last data filed at 7/4/2023 0427  Gross per 24 hour   Intake 50 ml   Output 975 ml   Net -925 ml       Lines/Drains/Airways       Drain  Duration             Female External Urinary Catheter 06/30/23 2223 3 days              Peripheral Intravenous Line  Duration                  Peripheral  IV - Single Lumen 07/03/23 0734 20 G Left Antecubital 1 day                    Significant Labs:   Recent Results (from the past 72 hour(s))   POCT glucose    Collection Time: 07/01/23 11:26 AM   Result Value Ref Range    POCT Glucose 273 (H) 70 - 110 mg/dL   POCT glucose    Collection Time: 07/01/23  5:33 PM   Result Value Ref Range    POCT Glucose 242 (H) 70 - 110 mg/dL   Respiratory Panel    Collection Time: 07/01/23  5:41 PM   Result Value Ref Range    Adenovirus Not Detected Not Detected    Coronavirus 229E Not Detected Not Detected    Coronavirus HKU1 Not Detected Not Detected    Coronavirus NL63 Not Detected Not Detected    Coronavirus OC43 PCR, Common Cold Virus Not Detected Not Detected    Human Metapneumovirus Not Detected Not Detected    Parainfluenza Virus 1 Not Detected Not Detected    Parainfluenza Virus 2 Not Detected Not Detected    Parainfluenza Virus 3 Not Detected Not Detected    Parainfluenza Virus 4 Not Detected Not Detected    Bordetella pertussis (ptxP) Not Detected Not Detected    Chlamydia pneumoniae Not Detected Not Detected    Mycoplasma pneumoniae Not Detected Not Detected    Human Rhinovirus/Enterovirus Not Detected Not Detected    Bordetella parapertussis (RN0543) Not Detected Not Detected   POCT glucose    Collection Time: 07/01/23  8:30 PM   Result Value Ref Range    POCT Glucose 290 (H) 70 - 110 mg/dL   POCT glucose    Collection Time: 07/02/23  4:24 AM   Result Value Ref Range    POCT Glucose 277 (H) 70 - 110 mg/dL   Folate    Collection Time: 07/02/23  5:27 AM   Result Value Ref Range    Folate Level 10.5 7.0 - 31.4 ng/mL   Basic Metabolic Panel    Collection Time: 07/02/23  5:27 AM   Result Value Ref Range    Sodium Level 134 (L) 136 - 145 mmol/L    Potassium Level 4.1 3.5 - 5.1 mmol/L    Chloride 99 98 - 107 mmol/L    Carbon Dioxide 22 (L) 23 - 31 mmol/L    Glucose Level 329 (H) 82 - 115 mg/dL    Blood Urea Nitrogen 15.6 9.8 - 20.1 mg/dL    Creatinine 1.38 (H) 0.55 - 1.02 mg/dL     BUN/Creatinine Ratio 11     Calcium Level Total 8.3 (L) 8.4 - 10.2 mg/dL    Anion Gap 13.0 mEq/L    eGFR 39 mls/min/1.73/m2   CBC with Differential    Collection Time: 07/02/23  5:27 AM   Result Value Ref Range    WBC 13.30 (H) 4.50 - 11.50 x10(3)/mcL    RBC 3.70 (L) 4.20 - 5.40 x10(6)/mcL    Hgb 10.1 (L) 12.0 - 16.0 g/dL    Hct 32.3 (L) 37.0 - 47.0 %    MCV 87.3 80.0 - 94.0 fL    MCH 27.3 27.0 - 31.0 pg    MCHC 31.3 (L) 33.0 - 36.0 g/dL    RDW 14.8 11.5 - 17.0 %    Platelet 553 (H) 130 - 400 x10(3)/mcL    MPV 10.1 7.4 - 10.4 fL    Neut % 67.1 %    Lymph % 19.9 %    Mono % 8.0 %    Eos % 3.1 %    Basophil % 1.4 %    Lymph # 2.65 0.6 - 4.6 x10(3)/mcL    Neut # 8.92 2.1 - 9.2 x10(3)/mcL    Mono # 1.07 0.1 - 1.3 x10(3)/mcL    Eos # 0.41 0 - 0.9 x10(3)/mcL    Baso # 0.19 <=0.2 x10(3)/mcL    IG# 0.06 (H) 0 - 0.04 x10(3)/mcL    IG% 0.5 %    NRBC% 0.0 %   POCT glucose    Collection Time: 07/02/23  9:45 AM   Result Value Ref Range    POCT Glucose 393 (H) 70 - 110 mg/dL   POCT glucose    Collection Time: 07/02/23 12:18 PM   Result Value Ref Range    POCT Glucose 472 (HH) 70 - 110 mg/dL   POCT glucose    Collection Time: 07/02/23  4:12 PM   Result Value Ref Range    POCT Glucose 297 (H) 70 - 110 mg/dL   POCT glucose    Collection Time: 07/02/23  8:30 PM   Result Value Ref Range    POCT Glucose 210 (H) 70 - 110 mg/dL   Basic Metabolic Panel    Collection Time: 07/03/23  5:41 AM   Result Value Ref Range    Sodium Level 136 136 - 145 mmol/L    Potassium Level 4.2 3.5 - 5.1 mmol/L    Chloride 100 98 - 107 mmol/L    Carbon Dioxide 23 23 - 31 mmol/L    Glucose Level 166 (H) 82 - 115 mg/dL    Blood Urea Nitrogen 16.0 9.8 - 20.1 mg/dL    Creatinine 1.29 (H) 0.55 - 1.02 mg/dL    BUN/Creatinine Ratio 12     Calcium Level Total 8.1 (L) 8.4 - 10.2 mg/dL    Anion Gap 13.0 mEq/L    eGFR 42 mls/min/1.73/m2   Magnesium    Collection Time: 07/03/23  5:41 AM   Result Value Ref Range    Magnesium Level 0.90 (LL) 1.60 - 2.60 mg/dL    Phosphorus    Collection Time: 07/03/23  5:41 AM   Result Value Ref Range    Phosphorus Level 3.7 2.3 - 4.7 mg/dL   CBC with Differential    Collection Time: 07/03/23  5:42 AM   Result Value Ref Range    WBC 9.97 4.50 - 11.50 x10(3)/mcL    RBC 3.40 (L) 4.20 - 5.40 x10(6)/mcL    Hgb 9.1 (L) 12.0 - 16.0 g/dL    Hct 28.8 (L) 37.0 - 47.0 %    MCV 84.7 80.0 - 94.0 fL    MCH 26.8 (L) 27.0 - 31.0 pg    MCHC 31.6 (L) 33.0 - 36.0 g/dL    RDW 14.8 11.5 - 17.0 %    Platelet 510 (H) 130 - 400 x10(3)/mcL    MPV 10.3 7.4 - 10.4 fL    Neut % 54.6 %    Lymph % 31.7 %    Mono % 8.2 %    Eos % 4.1 %    Basophil % 1.0 %    Lymph # 3.16 0.6 - 4.6 x10(3)/mcL    Neut # 5.44 2.1 - 9.2 x10(3)/mcL    Mono # 0.82 0.1 - 1.3 x10(3)/mcL    Eos # 0.41 0 - 0.9 x10(3)/mcL    Baso # 0.10 <=0.2 x10(3)/mcL    IG# 0.04 0 - 0.04 x10(3)/mcL    IG% 0.4 %    NRBC% 0.0 %   POCT glucose    Collection Time: 07/03/23  5:58 AM   Result Value Ref Range    POCT Glucose 152 (H) 70 - 110 mg/dL   POCT glucose    Collection Time: 07/03/23 11:02 AM   Result Value Ref Range    POCT Glucose 156 (H) 70 - 110 mg/dL   Magnesium    Collection Time: 07/03/23 12:58 PM   Result Value Ref Range    Magnesium Level 2.80 (H) 1.60 - 2.60 mg/dL   POCT glucose    Collection Time: 07/03/23  5:20 PM   Result Value Ref Range    POCT Glucose 95 70 - 110 mg/dL   POCT glucose    Collection Time: 07/03/23  9:21 PM   Result Value Ref Range    POCT Glucose 171 (H) 70 - 110 mg/dL   Basic Metabolic Panel    Collection Time: 07/04/23  4:45 AM   Result Value Ref Range    Sodium Level 136 136 - 145 mmol/L    Potassium Level 3.6 3.5 - 5.1 mmol/L    Chloride 103 98 - 107 mmol/L    Carbon Dioxide 23 23 - 31 mmol/L    Glucose Level 117 (H) 82 - 115 mg/dL    Blood Urea Nitrogen 17.0 9.8 - 20.1 mg/dL    Creatinine 1.33 (H) 0.55 - 1.02 mg/dL    BUN/Creatinine Ratio 13     Calcium Level Total 8.4 8.4 - 10.2 mg/dL    Anion Gap 10.0 mEq/L    eGFR 40 mls/min/1.73/m2   Magnesium    Collection Time:  07/04/23  4:45 AM   Result Value Ref Range    Magnesium Level 1.90 1.60 - 2.60 mg/dL   CBC with Differential    Collection Time: 07/04/23  4:45 AM   Result Value Ref Range    WBC 8.42 4.50 - 11.50 x10(3)/mcL    RBC 3.26 (L) 4.20 - 5.40 x10(6)/mcL    Hgb 8.9 (L) 12.0 - 16.0 g/dL    Hct 27.7 (L) 37.0 - 47.0 %    MCV 85.0 80.0 - 94.0 fL    MCH 27.3 27.0 - 31.0 pg    MCHC 32.1 (L) 33.0 - 36.0 g/dL    RDW 14.8 11.5 - 17.0 %    Platelet 452 (H) 130 - 400 x10(3)/mcL    MPV 10.6 (H) 7.4 - 10.4 fL    Neut % 56.3 %    Lymph % 29.3 %    Mono % 8.9 %    Eos % 4.0 %    Basophil % 1.3 %    Lymph # 2.47 0.6 - 4.6 x10(3)/mcL    Neut # 4.73 2.1 - 9.2 x10(3)/mcL    Mono # 0.75 0.1 - 1.3 x10(3)/mcL    Eos # 0.34 0 - 0.9 x10(3)/mcL    Baso # 0.11 <=0.2 x10(3)/mcL    IG# 0.02 0 - 0.04 x10(3)/mcL    IG% 0.2 %    NRBC% 0.0 %     EKG:        Telemetry:  Sinus Rhythm    Physical Exam  Vitals and nursing note reviewed.   HENT:      Head: Normocephalic.      Mouth/Throat:      Mouth: Mucous membranes are moist.      Pharynx: Oropharynx is clear.   Cardiovascular:      Rate and Rhythm: Normal rate and regular rhythm.   Pulmonary:      Effort: Pulmonary effort is normal. No respiratory distress.   Abdominal:      Palpations: Abdomen is soft.   Musculoskeletal:      Cervical back: Neck supple.   Skin:     General: Skin is warm and dry.   Neurological:      Mental Status: She is alert.     Current Inpatient Medications:    Current Facility-Administered Medications:     acetaminophen tablet 1,000 mg, 1,000 mg, Oral, Q6H PRN, Deep Henley, FNP, 1,000 mg at 07/04/23 0446    acetaminophen tablet 650 mg, 650 mg, Oral, Q4H PRN, OSMAR Canchola, 650 mg at 07/03/23 1833    ALPRAZolam tablet 0.25 mg, 0.25 mg, Oral, Daily PRN, Andreea Cole MD    amLODIPine tablet 10 mg, 10 mg, Oral, Daily, Andreea Cole MD, 10 mg at 07/04/23 0847    atorvastatin tablet 40 mg, 40 mg, Oral, QHS, Andreea Cole MD, 40 mg at 07/03/23 2122     B12-levomefolate calcium-B6 (FOLBIC RF) 2-1.13-25 mg tablet 1 tablet, 1 tablet, Oral, Daily, Jerome Tabor MD, 1 tablet at 07/04/23 0846    butalbital-acetaminophen-caffeine -40 mg per tablet 1 tablet, 1 tablet, Oral, Q4H PRN, Andreea Cole MD, 1 tablet at 07/04/23 0847    cloNIDine 0.2 mg/24 hr td ptwk 1 patch, 1 patch, Transdermal, Q7 Days, Andreea Cole MD, 1 patch at 07/01/23 1435    clopidogreL tablet 75 mg, 75 mg, Oral, Daily, Andreea Cole MD, 75 mg at 07/04/23 0847    collagenase ointment, , Topical (Top), Daily, Jerome Tabor MD, Given at 07/04/23 0847    diphenhydrAMINE capsule 25 mg, 25 mg, Oral, Q6H PRN, OSMAR Canchola, 25 mg at 07/03/23 1747    enoxaparin injection 40 mg, 40 mg, Subcutaneous, Daily, OSMAR Canchola, 40 mg at 07/03/23 1747    gabapentin capsule 100 mg, 100 mg, Oral, QHS, Jerome Tabor MD, 100 mg at 07/03/23 2122    hydrALAZINE injection 20 mg, 20 mg, Intravenous, Q8H PRN, Andreea Cole MD    hydrALAZINE tablet 50 mg, 50 mg, Oral, Q8H, Andreea Cole MD, 50 mg at 07/04/23 0446    HYDROcodone-acetaminophen 5-325 mg per tablet 1 tablet, 1 tablet, Oral, Q6H PRN, Andreea Cole MD, 1 tablet at 07/02/23 2042    insulin aspart U-100 injection 0-5 Units, 0-5 Units, Subcutaneous, QID (AC + HS) PRN, MARY CancholaP, 3 Units at 07/02/23 1624    insulin detemir U-100 injection 20 Units, 20 Units, Subcutaneous, BID, Jerome Tabor MD, 20 Units at 07/04/23 0848    lactated ringers infusion, , Intravenous, Continuous, Jerome Tabor MD    magnesium oxide tablet 400 mg, 400 mg, Oral, Daily, Jerome Tabor MD, 400 mg at 07/04/23 0847    megestroL 400 mg/10 mL (10 mL) suspension 400 mg, 400 mg, Oral, Daily, Andreea Cole MD, 400 mg at 07/04/23 0846    melatonin tablet 6 mg, 6 mg, Oral, Nightly PRN, Jerome Tabor MD, 6 mg at 07/02/23 2033    metoprolol succinate (TOPROL-XL) 24 hr tablet 50 mg, 50 mg, Oral, Daily, Andreea Cole MD, 50 mg at  07/04/23 0847    naloxone 0.4 mg/mL injection 0.02 mg, 0.02 mg, Intravenous, PRN, OSMAR Canchola    ondansetron injection 4 mg, 4 mg, Intravenous, Q4H PRN, OSMAR Canchola, 4 mg at 07/01/23 0426    pantoprazole EC tablet 40 mg, 40 mg, Oral, Daily, Jerome Tabor MD, 40 mg at 07/04/23 0847    prochlorperazine injection Soln 5 mg, 5 mg, Intravenous, Q6H PRN, OSMAR Canchola    QUEtiapine tablet 25 mg, 25 mg, Oral, After dinner, Jerome Tabor MD, 25 mg at 07/03/23 1830    sodium chloride 0.9% flush 10 mL, 10 mL, Intravenous, PRN, OSMAR Canchola    thiamine tablet 100 mg, 100 mg, Oral, BID, Jerome Tabor MD, 100 mg at 07/04/23 0847    VTE Risk Mitigation (From admission, onward)           Ordered     enoxaparin injection 40 mg  Daily         07/01/23 0246     IP VTE HIGH RISK PATIENT  Once         07/01/23 0224     Place sequential compression device  Until discontinued         07/01/23 0224                  Assessment:   SVT- Now Sinus Rhythm  Hypomagnesemia (Resolved)  Gastritis  Dysphagia  Suspected Aspiration PNA  Leukocytosis  VERNON on CKD  CVA (6.21.23)  --Right MCA Territory and Right GERTRUDIS territory   HTN (BP Stable)  HLD  DM II  RLS   GERD  MANJULA   Anxiety  Depression  Obesity   No History of GI Bleed     Plan:   Continue Current Beta Blockade- Toprol XL 50 Mg Daily  K+ Goal- 4, Mag Goal- 2; Replete PRN   Continue Tele Monitoring  Plan 1 Week MCT Monitor Outpatient Re: SVT with Recent Stroke, Rule out AF  Will sign off. Call if needed.    OSMAR Schwartz  Cardiology  [facility] - 5th Floor Med Surg  07/04/2023     Physician addendum:  I have seen and examined this patient as a split-shared visit with the JANET d/t complicated medical management of above problems written in assessment and high acuity requiring physician expertise in medical decision-making. I performed the substantive portion of the history and exam. Above medical decision-making is  also formulated by me.    Cardiovascular exam:  S1, S2  Lungs:  Fine crackles at bases.  Extremities:  1+ Edema bilaterally    Plan:  Patient will need 1 week of MCOT monitoring.  As per Dr. Helton no pacemaker needed.  Patient continued to be in accelerated junctional rhythm.  Asymptomatic.  We will sign off.    Kartik Suarez MD  Cardiologist

## 2023-07-04 NOTE — PT/OT/SLP PROGRESS
Occupational Therapy   Treatment    Name: Denise Oleary  MRN: [id number]    Recommendations:     Discharge Recommendations: nursing facility, skilled  Discharge Equipment Recommendations:    Barriers to discharge:  severity of deficits    Assessment:     Denise Oleary is a 83 y.o. female with a medical diagnosis of Nausea, vomiting, dysphagia for 2 weeks now, recently diagnosed with CVA on 7/21/2023; and a recent ankle fx(ORIF) 2 months ago. Performance deficits affecting function are weakness, impaired endurance, impaired self care skills, impaired functional mobility, impaired balance, visual deficits, decreased safety awareness, impaired coordination, impaired fine motor, decreased ROM, decreased upper extremity function, decreased lower extremity function.     Rehab Prognosis:  Good; patient would benefit from acute skilled OT services to address these deficits and reach maximum level of function.       Plan:     Patient to be seen 6 x/week to address the above listed problems via self-care/home management, therapeutic activities, therapeutic exercises  Plan of Care Expires: 07/14/23  Plan of Care Reviewed with: patient    Subjective     Pain/Comfort:  Pain Rating 1: 5/10 (headache)    Objective:     Communicated with: NSG prior to session.  Patient found HOB elevated with PureWick, peripheral IV, pulse ox (continuous), telemetry upon OT entry to room.    General Precautions: Standard, fall    Orthopedic Precautions: (kept NWB on R LE)  Braces:    Respiratory Status: Room air  Vital Signs: WNL throughout     Occupational Performance:     Bed Mobility:    Patient completed Supine to Sit with moderate assistance     Functional Mobility/Transfers:  Patient completed Sit <> Stand Transfer with moderate assistance and of 2 persons  with  hand-held assist   Patient completed Bed <> Chair Transfer using Step Transfer technique with moderate assistance and of 2 persons with hand-held assist    Activities of Daily  Living:  Grooming: set up assist for oral hygiene seated in chair ; required assist to stabilize basin with L UE      Therapeutic Positioning    OT interventions performed during the course of today's session in an effort to prevent and/or reduce acquired pressure injuries:   Education on Pressure Ulcer Prevention provided  Therapeutic positioning completed     Skin assessment: all bony prominences were assessed    Findings: Bruises on BU/LE and buttock      Patient Education:  Patient and spouse provided with verbal education regarding OT role/goals/POC, post op precautions, fall prevention, safety awareness, Discharge/DME recommendations, and pressure ulcer prevention.  Understanding was verbalized.      Patient left up in chair with all lines intact, call button in reach, NSG notified, and spouse present    GOALS:   Multidisciplinary Problems       Occupational Therapy Goals          Problem: Occupational Therapy    Goal Priority Disciplines Outcome Interventions   Occupational Therapy Goal     OT, PT/OT Ongoing, Progressing    Description: Goals to be met by: 7/14/23     Patient will increase functional independence with ADLs by performing:    UE Dressing with Set-up Assistance.  LE Dressing with Moderate Assistance and Assistive Devices as needed.  Grooming while EOB with Set-up Assistance.  Toileting from bedside commode with Moderate Assistance for hygiene and clothing management.                          Time Tracking:     OT Date of Treatment:    OT Start Time: 0937  OT Stop Time: 0958  OT Total Time (min): 21 min    Billable Minutes:Self Care/Home Management 1    OT/EVETTE: OT     Number of EVETTE visits since last OT visit: 1 7/4/2023

## 2023-07-05 NOTE — PT/OT/SLP PROGRESS
Physical Therapy  Treatment    Denise Oleary   MRN: [id number]   Admitting Diagnosis: <principal problem not specified>           Pt getting GI study post OT session. PT will follow up tomorrow. Call placed to LOS to get weightbearing clarification from Dr. Mino Orlando. Awaiting call back.

## 2023-07-05 NOTE — PROGRESS NOTES
[facility] Hospital Medicine Progress Note        Chief Complaint: Inpatient Follow-up for   persistent nausea and vomiting     HPI:   Denise Oleary is a 83 y.o. female with a past medical history of essential hypertension, hyperlipidemia, CKD stage IIIB, recent CVA with left-sided deficits, diabetes mellitus type 2, GERD, anxiety, and depression presented to [facility] on 6/30/2023 for nausea and vomiting.  Patient reports nausea and vomiting began 5 days ago.  Patient also endorsed difficulty swallowing.  Family reported patient was recently diagnosed with CVA on 7/21/2023 and was inpatient at [facility] with discharge on 6/25/2023.   Initial vital signs in ED were /58, pulse 83, respirations 16, temperature 36.9° C, and SpO2 95% on room air.  Labs revealed WBC 15.93, RBC 3.87, hemoglobin 10.6, hematocrit 33.2, platelets 589, CO2 22, BUN 20.1, creatinine 1.82, and glucose 47.  UA revealed 2+ protein and trace ketones.  Chest x-ray revealed no acute abnormality.  Patient was given D10 bolus in ED with improvement of glucose.  Patient was given IM Phenergan with some improvement of nausea.  Patient was admitted to hospital medicine service for further medical management.     Nausea and vomiting improved since admission and tolerating soft and bite sized diet  however reports this has been going on on and off for more than a year now . Pt had EUS done in 5/2020 showed diffuse dilation of main pancreatic duct up to 11mm with pancreatic atrophy, no evidence of pancreatic mass, area of pancreatitc duct transition in head of gland, FNA - negative for malignant cells. She is under surveillance with Dr. Ktean Shepard. GI suggested EGD during this admission to further evaluate recurrent N/V showed  normal esophagus, mild erythema gastric antrum, no bleeding, biopsy taken, normal duodenum. No GI cause for N/V found. Small bowel follow thru ordered per GI. Completed Unasyn x 3 days for presumed  aspiration pneumonitis with normalization of WBC. H&H remained fairly stable . Creatinine slowly improved with IVF replacement. Episode of hallucination reported at night. Noted pt was taking Ambien. Seroquel started in place of Ambien with improvement of hallucination. PT/OT/SLP consulted given recent stroke with left hemiparesis . Additionally pt had Rt ankle fracture sec to fall  s/p ORIF on 5/2023. SLP cleared pt for soft, bite sized diet. PT/OT suggested SNF placement.              Interval Hx:   Npo for SBFT ordered per GI  No new concern or c/o  Headache has resolved   Reports not sleeping well at night . Will increase Seroquel to 50 mg     BP remains labile  Labs showed creatinine improved to near baseline. Will stop IVF.   BG was low this morning due to being NPO      Case was discussed with patient's nurse and  on the floor.    Objective/physical exam:  General: In no acute distress, afebrile  Chest: Clear to auscultation bilaterally  Heart: RRR, +S1, S2, no appreciable murmur  Abdomen: Soft, nontender, BS +  MSK: Warm, no lower extremity edema, no clubbing or cyanosis, (+) bruise . Bandage noted Rt ankle where she had surgery 2 mos ago.   Neurologic: Currently awake, alert, oriented x 3, Cranial nerve II-XII intact except VII, Left tess paresis       VITAL SIGNS: 24 HRS MIN & MAX LAST   Temp  Min: 97.5 °F (36.4 °C)  Max: 97.5 °F (36.4 °C) 97.5 °F (36.4 °C)   BP  Min: 147/63  Max: 170/71 (!) 170/71   Pulse  Min: 73  Max: 86  73   Resp  Min: 17  Max: 18 17   SpO2  Min: 96 %  Max: 98 % 98 %     I have reviewed the following labs:    Recent Labs   Lab 07/02/23  0527 07/03/23  0542 07/04/23  0445   WBC 13.30* 9.97 8.42   RBC 3.70* 3.40* 3.26*   HGB 10.1* 9.1* 8.9*   HCT 32.3* 28.8* 27.7*   MCV 87.3 84.7 85.0   MCH 27.3 26.8* 27.3   MCHC 31.3* 31.6* 32.1*   RDW 14.8 14.8 14.8   * 510* 452*   MPV 10.1 10.3 10.6*       Recent Labs   Lab 06/30/23 2112 07/01/23  0611 07/02/23  0527  07/03/23  0541 07/03/23  1258 07/04/23  0445 07/05/23  0435    137   < > 136  --  136 140   K 3.7 4.6   < > 4.2  --  3.6 3.4*   CO2 22* 22*   < > 23  --  23 25   BUN 20.1 18.4   < > 16.0  --  17.0 12.6   CREATININE 1.82* 1.68*   < > 1.29*  --  1.33* 1.23*   CALCIUM 9.2 8.4   < > 8.1*  --  8.4 8.9   MG  --   --   --  0.90* 2.80* 1.90  --    ALBUMIN 3.1* 3.1*  --   --   --   --   --    ALKPHOS 68 66  --   --   --   --   --    ALT 6 5  --   --   --   --   --    AST 14 14  --   --   --   --   --    BILITOT 0.4 0.5  --   --   --   --   --     < > = values in this interval not displayed.          Microbiology Results (last 7 days)       ** No results found for the last 168 hours. **             See below for Radiology    Scheduled Med:   amLODIPine  10 mg Oral Daily    atorvastatin  40 mg Oral QHS    B12-levomefolate calcium-B6  1 tablet Oral Daily    cloNIDine 0.2 mg/24 hr td ptwk  1 patch Transdermal Q7 Days    clopidogreL  75 mg Oral Daily    collagenase   Topical (Top) Daily    enoxparin  40 mg Subcutaneous Daily    gabapentin  100 mg Oral QHS    hydrALAZINE  50 mg Oral Q8H    insulin detemir U-100  20 Units Subcutaneous BID    magnesium oxide  400 mg Oral Daily    megestroL  400 mg Oral Daily    metoprolol succinate  50 mg Oral Daily    pantoprazole  40 mg Oral Daily    QUEtiapine  50 mg Oral After dinner    thiamine  100 mg Oral BID        Continuous Infusions:       PRN Meds:  acetaminophen, acetaminophen, ALPRAZolam, butalbital-acetaminophen-caffeine -40 mg, diphenhydrAMINE, hydrALAZINE, HYDROcodone-acetaminophen, insulin aspart U-100, melatonin, naloxone, ondansetron, prochlorperazine, sodium chloride 0.9%       Assessment/Plan:  Intractable Nausea and Vomiting   Acute kidney injury on CKD III, pre renal due to IVVD  Metabolic acidosis , mild   Leukocytosis - resolved   Presumed aspiration pneumonitis in the setting of recurrent vomiting   Normochromic anemia - stable   Thrombocytosis , reactive    Severe vitamin B12 deficiency   Diabetes Mellitus, type 2- uncontrolled due to hypo and hyperglycemia   Recent  Rt  MCA/ GERTRUDIS distribution stroke with left sided deficit - 6/21/23  Recent Rt ankle fracture sec to fall  s/p ORIF - 5/2023  Hallucination / Delirium - improved   Severe Hypomagnesemia - 7/3/23     Hx- Essential HTN, HLD, CKD IIIB, GERD, anxiety/ Depression      Plan-  S/P EGD 7/3/23   SBFT today per GI  Completed  antibiotic targeting Aspiration pneumonia x 3 days   Monitor clinical course      Monitor blood cell counts   Replace B12  WBC normalized   Hgb fairly stable   Creatinine improved to near baseline   IVF discontinued   Monitor renal parameter , UOP  Replete electrolytes as indicated      Accu check AC/HS and cover with sliding scale insulin and add basal insulin      PT/OT/ SLP consulted      Avoid hypnotics and sedative   Ambien discontinued   Seroquel added nightly - increased to 50 mg today   CT head with no acute intracranial process except evidence of recent stroke      Continue supportive treatment with anti emetics and others     VTE prophylaxis: Lovenox     Patient condition:  Fair     Anticipated discharge and Disposition:     SNF placement     All diagnosis and differential diagnosis have been reviewed; assessment and plan has been documented; I have personally reviewed the labs and test results that are presently available; I have reviewed the patients medication list; I have reviewed the consulting providers response and recommendations. I have reviewed or attempted to review medical records based upon their availability    All of the patient's questions have been  addressed and answered. Patient's is agreeable to the above stated plan. I will continue to monitor closely and make adjustments to medical management as needed.  _____________________________________________________________________    Nutrition Status:    Radiology:  I have personally reviewed the following imaging and  agree with the radiologist.     CT Head Without Contrast  Narrative: EXAMINATION:  CT HEAD WITHOUT CONTRAST    CLINICAL HISTORY:  Stroke, follow up;Mental status change, unknown cause;    TECHNIQUE:  Low dose axial CT images obtained throughout the head without intravenous contrast. Sagittal and coronal reconstructions were performed.  An automated dose exposure technique was utilized this limits radiation does the patient.    COMPARISON:  06/03/2016    FINDINGS:  Intracranial compartment:    Ventricles and sulci are normal in size for age without evidence of hydrocephalus. No extra-axial blood or fluid collections.    The brain parenchyma appears normal. No parenchymal mass, hemorrhage, edema or major vascular distribution infarct.    Skull/extracranial contents (limited evaluation): No fracture. Mastoid air cells and paranasal sinuses are essentially clear.  Impression: No acute intracranial abnormality.  Findings consistent with infarct of the right MCA distribution as well as right GERTRUDIS distribution.  Changes of microangiopathy as well as age-appropriate volume loss.    Electronically signed by: Zain Duarte MD  Date:    07/02/2023  Time:    14:10  X-Ray Ankle Complete Right  Narrative: EXAMINATION:  XR ANKLE COMPLETE 3 VIEW RIGHT    CLINICAL HISTORY:  Rt ankle fracture;    TECHNIQUE:  Three views    COMPARISON:  May 31, 2023    FINDINGS:  There has been ORIF of the fractured distal fibula with placement of plate and screws.  Position and alignment is satisfactory.  Calcaneal enthesophyte.  Impression: As above.    Electronically signed by: Mahendra Addison  Date:    07/02/2023  Time:    11:59  X-Ray Abdomen AP 1 View  Narrative: EXAMINATION:  XR ABDOMEN AP 1 VIEW    CLINICAL HISTORY:  vomiting;    TECHNIQUE:  One view    COMPARISON:  None available    FINDINGS:  The intestinal gas pattern is nonspecific and nonobstructive. No air fluid levels or pneumoperitoneum identified. No convincing organomegaly.  Impression:  Nonspecific bowel gas pattern.    Electronically signed by: Mahendra Addison  Date:    07/02/2023  Time:    09:46      Jerome Tabor MD   07/05/2023

## 2023-07-05 NOTE — PT/OT/SLP PROGRESS
Occupational Therapy   Treatment    Name: Denise Oleary  MRN: [id number]  Admitting Diagnosis: Nausea, vomiting, dysphagia for 2 weeks now, recently diagnosed with CVA on 7/21/2023 and was inpatient at [facility] with discharge on 6/25/2023. Recent h/o R ankle fx and sx ~2 months ago per pt's family.  Pt also with h/o essential hypertension, hyperlipidemia, CKD stage IIIB, diabetes mellitus type 2, GERD, anxiety, and depression    Recommendations:     Discharge Recommendations: nursing facility, skilled  Discharge Equipment Recommendations: TBD, pending progress  Barriers to discharge: Medical dx    Assessment:     Denise Oleary is a 83 y.o. female with a medical diagnosis of Nausea, vomiting, dysphagia for 2 weeks now, recently diagnosed with CVA on 7/21/2023 and was inpatient at [facility] with discharge on 6/25/2023. Recent h/o R ankle fx and sx ~2 months ago per pt's family. Pt also with h/o essential hypertension, hyperlipidemia, CKD stage IIIB, diabetes mellitus type 2, GERD, anxiety, and depression. She presents with c/o fatigue. Performance deficits affecting function are weakness, impaired endurance, impaired self care skills, impaired functional mobility, impaired balance, visual deficits, decreased safety awareness, impaired coordination, impaired fine motor, decreased ROM, decreased upper extremity function, decreased lower extremity function.     Rehab Prognosis: Good; patient would benefit from acute skilled OT services to address these deficits and reach maximum level of function.       Plan:     Patient to be seen 6 x/week to address the above listed problems via self-care/home management, therapeutic activities, therapeutic exercises  Plan of Care Expires: 07/14/23  Plan of Care Reviewed with: patient, other (see comments) (pt's family members, including spouse)    Subjective     Pain/Comfort:  Pain Rating 1: 0/10    Pt c/o fatigue.    Objective:     Communicated with: RN prior to session. Patient found HOB  "elevated with telemetry, PureWick, peripheral IV upon OT entry to room.    General Precautions: Standard, fall  Orthopedic Precautions: Recent h/o R ankle fx and sx ~2 months ago per pt's family. Per pt's family, MD recommended use of boot while OOB and reported that pt can apply a "little weight through heel" for t/fs. However, since their is no note from MD or proof of current WBS, OT will be conservative and keep pt NWB RLE. Pt's family reported that pt has ortho f/u appt in ~2 wks.  Note: PTA called LOS today to clarify WBS. Awaiting call back.  Braces: R boot  Respiratory Status: Room air     Occupational Performance:     Bed Mobility:    Patient completed Supine to Sit with CGA  Patient completed Sit to Supine with dep A for safety (2/2 pt slipping off EOB after attempting sit <> stand)      Functional Mobility/Transfers:  Patient attempted to perform Sit <> Stand Transfer 3x from EOB using RW. OT positioned foot underneath pt's RLE in order to keep pt NWB on RLE (until WBS is clarified by MD). Pt also required max vcs to adhere to NWB RLE and for proper hand placement (with pt demonstrating poor command following). Pt was only able to perform one successful sit <> stand with max A x2 provided, using RW. However, pt was unable to maintain NWB RLE during stand, removing her RLE from on top of OT's foot. Pt's buttocks began slipping off EOB after third attempt, requiring dep A to perform sit > supine to ensure pt's safety.3  Note: R boot donned prior to functional mobility.    Activities of Daily Living:  Lower Body Dressing: Dep A to don/doff R boot while lying in bed.    Patient Education:  Patient provided with verbal education regarding proper hand placement and to keep RLE NWB during sit <> stand. Pt demonstrated poor understanding, and will require additional teaching.    OT and staff assisted pt with rolling to R/L sides in bed to re-position william pads, and OT then issued/positioned new " Alonso.  Medical staff arrived to perform SBFT.    Patient left supine with all lines intact, call button in reach, and medical staff present to perform SBFT.    GOALS:   Multidisciplinary Problems       Occupational Therapy Goals          Problem: Occupational Therapy    Goal Priority Disciplines Outcome Interventions   Occupational Therapy Goal     OT, PT/OT Ongoing, Progressing    Description: Goals to be met by: 7/14/23     Patient will increase functional independence with ADLs by performing:    UE Dressing with Set-up Assistance.  LE Dressing with Moderate Assistance and Assistive Devices as needed.  Grooming while EOB with Set-up Assistance.  Toileting from bedside commode with Moderate Assistance for hygiene and clothing management.                          Time Tracking:     OT Date of Treatment: 7/5/23  OT Start Time: 1127  OT Stop Time: 1148  OT Total Time (min): 21 min    Billable Minutes: Self Care/Home Management 21 mins    OT/EVETTE: OT     Number of EVETTE visits since last OT visit: 2    7/5/2023

## 2023-07-06 PROBLEM — N17.9 AKI (ACUTE KIDNEY INJURY): Status: ACTIVE | Noted: 2023-01-01

## 2023-07-06 NOTE — ANESTHESIA POSTPROCEDURE EVALUATION
Anesthesia Post Evaluation    Patient: Denise Oleary    Procedure(s) Performed: Procedure(s):  EGD, WITH CLOSED BIOPSY    Final Anesthesia Type: general      Patient location during evaluation: PACU  Patient participation: Yes- Able to Participate  Level of consciousness: awake and alert  Post-procedure vital signs: reviewed and stable  Pain management: adequate  Airway patency: patent      Anesthetic complications: no      Cardiovascular status: hemodynamically stable  Respiratory status: unassisted  Hydration status: euvolemic  Follow-up not needed.          Vitals Value Taken Time   /55 07/03/23 1647   Temp 36.6 °C (97.9 °F) 07/03/23 1647   Pulse 82 07/03/23 1647   Resp 18 07/03/23 1647   SpO2 97 % 07/03/23 1647         No case tracking events are documented in the log.      Pain/Marcelino Score: Pain Rating Prior to Med Admin: 3 (7/5/2023  9:22 PM)  Pain Rating Post Med Admin: 1 (7/5/2023 10:22 PM)

## 2023-07-06 NOTE — PLAN OF CARE
07/06/23 0922   Medicare Message   Important Message from Medicare regarding Discharge Appeal Rights Explained to patient/caregiver

## 2023-07-06 NOTE — DISCHARGE SUMMARY
[facility] Hospital Medicine Discharge Summary    Admit Date: 6/30/2023  Discharge Date and Time: 7/6/20239:21 AM  Admitting Physician: ROSA Team  Discharging Physician: Sharon Chamberlain MD.  Primary Care Physician: Yan Ferro MD    Discharge Diagnoses:  Nausea/vomiting-improved   History of pancreatic duct dilation   Recent CVA with residual left-sided deficits   Essential HTN  HLD  Acute on chronic kidney disease-improved   Type 2 diabetes mellitus   Symptomatic hypoglycemia on admit-improved   Anxiety/depression    Hospital Course:   83-year-old female with significant history of HTN, HLD, chronic kidney disease, recent CVA with residual left-sided deficit, type 2 diabetes mellitus, GERD, anxiety and depression presented to the ED with complaints of nausea/vomiting for 5 days with associated difficulty swallowing.  Patient had a recent hospitalization to outlying facility with CVA and was discharged on 06/25/2023 paid patient was hemodynamically stable in the ED.  Lab significant for leukocytosis, renal insufficiency.  Chest x-ray negative.  Patient was hypoglycemic and therefore received D10 bolus with improvement.  Patient was admitted hospitalist medicine service for further management.  Patient improved with symptomatic management.  Speech consulted, cleared for modified diet which she was able to tolerate well paid she had an EUS done in 2020 which showed dilation of pancreatic duct, no pancreatic mass paid she is on close follow-up with GI for the same.  GI evaluated the patient this hospitalization and she underwent EGD which was not impressive.  After EGD GI suggested small-bowel follow-through.  Small-bowel follow-through also came back unremarkable.  Patient was initiated on IV fluids on admit for acute on chronic kidney disease, renal function improved with IV fluids .  Nightly Seroquel added for insomnia paid patient had a CT head this hospitalization which was  negative except for recent CVA . patient evaluated 7/6.  Symptomatically much better.  Therapy Services had evaluated the patient and they recommended skilled nursing facility placement.  Patient was accepted skilled nursing facility on 07/06.  cleared by GI, tolerating p.o. diet.  Labs stable.  Hemodynamics stable.  Therefore she was discharged in stable condition to skilled nursing facility on 07/06 paid follow-up with primary care physician, VANGIE.  Treatment plans discussed with the patient and  and they voiced understanding to everything explained.    Vitals:  VITAL SIGNS: 24 HRS MIN & MAX LAST   Temp  Min: 97.6 °F (36.4 °C)  Max: 98.2 °F (36.8 °C) 98.2 °F (36.8 °C)   BP  Min: 125/61  Max: 174/73 (!) 145/78   Pulse  Min: 73  Max: 85  73   Resp  Min: 17  Max: 18 18   SpO2  Min: 96 %  Max: 98 % 96 %       Physical Exam:  General appearance:  No acute distress  HENT: Atraumatic   Lungs: Clear to auscultation bilaterally.   Heart: RRR,No edema  Abdomen: Soft, non tender   Extremities: warm  Neuro:  Awake, alert, oriented   Psych/mental status: Appropriate mood and affect.      Procedures Performed: No admission procedures for hospital encounter.     Significant Diagnostic Studies: See Full reports for all details    Recent Labs   Lab 07/03/23  0542 07/04/23  0445 07/06/23  0547   WBC 9.97 8.42 9.79   RBC 3.40* 3.26* 3.63*   HGB 9.1* 8.9* 9.7*   HCT 28.8* 27.7* 31.3*   MCV 84.7 85.0 86.2   MCH 26.8* 27.3 26.7*   MCHC 31.6* 32.1* 31.0*   RDW 14.8 14.8 15.1   * 452* 466*   MPV 10.3 10.6* 11.0*       Recent Labs   Lab 06/30/23  2112 07/01/23  0611 07/02/23  0527 07/03/23  1258 07/04/23  0445 07/05/23  0435 07/06/23  0547    137   < >  --  136 140 135*   K 3.7 4.6   < >  --  3.6 3.4* 4.1   CO2 22* 22*   < >  --  23 25 26   BUN 20.1 18.4   < >  --  17.0 12.6 13.0   CREATININE 1.82* 1.68*   < >  --  1.33* 1.23* 1.27*   CALCIUM 9.2 8.4   < >  --  8.4 8.9 8.9   MG  --   --    < > 2.80* 1.90  --  1.30*    ALBUMIN 3.1* 3.1*  --   --   --   --   --    ALKPHOS 68 66  --   --   --   --   --    ALT 6 5  --   --   --   --   --    AST 14 14  --   --   --   --   --    BILITOT 0.4 0.5  --   --   --   --   --     < > = values in this interval not displayed.        Microbiology Results (last 7 days)       ** No results found for the last 168 hours. **             XR Small Bowel Follow Through  Narrative: EXAMINATION:  XR SMALL BOWEL FOLLOW THROUGH    CLINICAL HISTORY:  n/v/ weight loss;    TECHNIQUE:  Plain radiographs were of the abdomen prior to and following oral administration of barium with transit of barium followed to the level of the colon.  Fluoroscopy.    COMPARISON:  Abdomen radiograph 07/02/2023    FINDINGS:   image demonstrates small volume retained barium at the rectum from speech pathology exam 07/01/2023. no dilated bowel loops seen.    Barium was administered PO.  Normal caliber small bowel loops are seen.  Transit time to the colon less than 2 hours.  This is normal.  Impression: Unremarkable small bowel series.    Electronically signed by: Abiola Brennan  Date:    07/05/2023  Time:    16:15         Medication List        START taking these medications      B12-levomefolate calcium-B6 2-1.13-25 mg Tab tablet  Commonly known as: FOLBIC RF  Take 1 tablet by mouth once daily.     collagenase ointment  Commonly known as: SANTYL  Apply topically once daily. for 10 days     docusate sodium 100 MG capsule  Commonly known as: COLACE  Take 1 capsule (100 mg total) by mouth 2 (two) times daily.     HYDROcodone-acetaminophen 5-325 mg per tablet  Commonly known as: NORCO  Take 1 tablet by mouth every 8 (eight) hours as needed for Pain.     linaCLOtide 145 mcg Cap capsule  Commonly known as: LINZESS  Take 1 capsule (145 mcg total) by mouth before breakfast.  Start taking on: July 7, 2023     magnesium oxide 400 mg (241.3 mg magnesium) tablet  Commonly known as: MAG-OX  Take 1 tablet (400 mg total) by mouth once  daily.     megestroL 400 mg/10 mL (10 mL) Susp  Commonly known as: MEGACE  Take 10 mLs (400 mg total) by mouth once daily.     olmesartan 40 MG tablet  Commonly known as: BENICAR  Take 1 tablet (40 mg total) by mouth once daily.     QUEtiapine 50 MG tablet  Commonly known as: SEROQUEL  Take 1 tablet (50 mg total) by mouth after dinner.     thiamine 100 MG tablet  Take 1 tablet (100 mg total) by mouth once daily.            CHANGE how you take these medications      amLODIPine 10 MG tablet  Commonly known as: NORVASC  Take 1 tablet (10 mg total) by mouth once daily.  What changed:   medication strength  how much to take     atorvastatin 40 MG tablet  Commonly known as: LIPITOR  Take 1 tablet (40 mg total) by mouth every evening.  What changed:   medication strength  how much to take     gabapentin 100 MG capsule  Commonly known as: NEURONTIN  Take 1 capsule (100 mg total) by mouth every evening.  What changed:   medication strength  how much to take  when to take this     hydrALAZINE 50 MG tablet  Commonly known as: APRESOLINE  Take 1 tablet (50 mg total) by mouth every 8 (eight) hours.  What changed:   medication strength  See the new instructions.     insulin detemir U-100 100 unit/mL injection  Commonly known as: Levemir  Inject 20 Units into the skin every evening.  What changed:   how much to take  when to take this  Another medication with the same name was removed. Continue taking this medication, and follow the directions you see here.     NovoLOG FlexPen U-100 Insulin 100 unit/mL (3 mL) Inpn pen  Generic drug: insulin aspart U-100  Inject 10 Units into the skin 3 (three) times daily with meals. FOLLOW ISS  What changed: additional instructions            CONTINUE taking these medications      ADULT LOW DOSE ASPIRIN 81 MG EC tablet  Generic drug: aspirin     ALPRAZolam 0.25 MG tablet  Commonly known as: XANAX  Take 1 tablet (0.25 mg total) by mouth daily as needed for Anxiety.     azelastine 137 mcg (0.1 %)  nasal spray  Commonly known as: ASTELIN     benzonatate 200 MG capsule  Commonly known as: TESSALON     calcium-vitamin D3 500 mg-5 mcg (200 unit) per tablet  Commonly known as: OS-MARIAJOSE 500 + D3     clopidogreL 75 mg tablet  Commonly known as: PLAVIX     CONTOUR NEXT [place] MET[place] [facility] [place]  Generic drug: blood-glucose meter     CONTOUR NEXT TEST STRIPS Strp  Generic drug: blood sugar diagnostic  1 strip by Misc.(Non-Drug; Combo Route) route 4 (four) times daily.     DEXCOM G6  Misc  Generic drug: blood-glucose meter,continuous  1 each by Misc.(Non-Drug; Combo Route) route every 14 (fourteen) days.     DEXCOM G6 SENSOR Cee  Generic drug: blood-glucose sensor  10 each by Misc.(Non-Drug; Combo Route) route every 14 (fourteen) days.     DEXCOM G6 TRANSMITTER Cee  Generic drug: blood-glucose transmitter  1 each by Misc.(Non-Drug; Combo Route) route every 14 (fourteen) days.     famotidine 40 MG tablet  Commonly known as: PEPCID     fluticasone propionate 50 mcg/actuation nasal spray  Commonly known as: FLONASE     FREESTYLE ANNE 2 READER [facility] [place]  Generic drug: flash glucose scanning reader  1 each by Misc.(Non-Drug; Combo Route) route once daily.     FREESTYLE ANNE 2 SENSOR Kit  Generic drug: flash glucose sensor  1 each by Misc.(Non-Drug; Combo Route) route every 14 (fourteen) days.     metFORMIN 500 MG tablet  Commonly known as: GLUCOPHAGE  TAKE TWO TABLETS BY MOUTH EVERY MORNING AND TWO TABLETS BY MOUTH AT BEDTIME     metoprolol succinate 50 MG 24 hr tablet  Commonly known as: TOPROL-XL  TAKE ONE TABLET BY MOUTH TWICE DAILY     MICROLET LANCET Misc  Generic drug: lancets     nystatin cream  Commonly known as: MYCOSTATIN     pantoprazole 40 MG tablet  Commonly known as: PROTONIX     rOPINIRole 1 MG tablet  Commonly known as: REQUIP  Take 1 tablet (1 mg total) by mouth daily as needed.     sucralfate 1 gram tablet  Commonly known as: CARAFATE     triamcinolone acetonide 0.1% 0.1 % cream  Commonly known as: KENALOG             STOP taking these medications      diphenoxylate-atropine 2.5-0.025 mg 2.5-0.025 mg per tablet  Commonly known as: LOMOTIL     doxycycline monohydrate 100 mg Tab     FLUoxetine 60 mg Tab     hydrOXYzine HCL 25 MG tablet  Commonly known as: ATARAX     insulin aspart protamine-insulin aspart 100 unit/mL (70-30) Inpn pen  Commonly known as: NovoLOG 70/30     meloxicam 7.5 MG tablet  Commonly known as: MOBIC     olmesartan-hydrochlorothiazide 40-25 mg per tablet  Commonly known as: BENICAR HCT     promethazine 25 MG tablet  Commonly known as: PHENERGAN     simvastatin 10 MG tablet  Commonly known as: ZOCOR     zolpidem 5 MG Tab  Commonly known as: AMBIEN               Where to Get Your Medications        You can get these medications from any pharmacy    Bring a paper prescription for each of these medications  ALPRAZolam 0.25 MG tablet  amLODIPine 10 MG tablet  atorvastatin 40 MG tablet  B12-levomefolate calcium-B6 2-1.13-25 mg Tab tablet  collagenase ointment  docusate sodium 100 MG capsule  gabapentin 100 MG capsule  hydrALAZINE 50 MG tablet  HYDROcodone-acetaminophen 5-325 mg per tablet  linaCLOtide 145 mcg Cap capsule  magnesium oxide 400 mg (241.3 mg magnesium) tablet  megestroL 400 mg/10 mL (10 mL) Susp  olmesartan 40 MG tablet  QUEtiapine 50 MG tablet  thiamine 100 MG tablet       Information about where to get these medications is not yet available    Ask your nurse or doctor about these medications  insulin detemir U-100 100 unit/mL injection  NovoLOG FlexPen U-100 Insulin 100 unit/mL (3 mL) Inpn pen          Explained in detail to the patient about the discharge plan, medications, and follow-up visits. Pt understands and agrees with the treatment plan  Discharge Disposition:    Discharged Condition: stable  Diet-   Dietary Orders (From admission, onward)       Start     Ordered    07/05/23 1809  Diet Soft & Bite Sized (IDDSI Level 6) Diabetic  Diet effective now        Question:  Diet Modifier:  Answer:   Diabetic    07/05/23 1808    07/01/23 1829  Dietary nutrition supplements Boost Glucose Control Chocolate; TID  Continuous        Comments: Any flavour she wants   Question Answer Comment   Select PO Supplement: Boost Glucose Control Chocolate    Frequency: TID        07/01/23 1829                   Medications Per DC med rec  Activities as tolerated   Follow-up Information       Wilson Gaffney MD Follow up in 1 week(s).    Specialty: Cardiology  Why: Hospital Follow Up- Needs 1 Week MCT Monitor to be fit outpatient Re: Stroke/SVT Rule out AF.  Contact information:  [facility] [address] [facility].  [place] [address]  [phone]               Yan Ferro MD Follow up in 1 week(s).    Specialty: Internal Medicine  Contact information:  [address] [place] [address]  [phone]               Junaid Carcamo MD Follow up in 1 week(s).    Specialty: Gastroenterology  Contact information:  [address] [facility] [place]  [address]  [place] [address]  [phone]                           For further questions contact hospitalist office    Discharge time 33 minutes    For worsening symptoms, chest pain, shortness of breath, increased abdominal pain, high grade fever, stroke or stroke like symptoms, immediately go to the nearest Emergency Room or call 911 as soon as possible.      Sharon Amaro M.D, on 7/6/2023. at 9:21 AM.

## 2023-07-06 NOTE — PLAN OF CARE
07/06/23 0930   Final Note   Assessment Type Final Discharge Note   Anticipated Discharge Disposition Swing [facility]  ([facility])   Post-Acute Status   Post-Acute Authorization Placement   Post-Acute Placement Status Set-up Complete/Auth obtained   Discharge Delays None known at this time

## 2023-07-13 NOTE — TELEPHONE ENCOUNTER
----- Message from Phyllis Leon sent at 7/13/2023  9:50 AM CDT -----  Regarding: Referral      Needs a referral to a Neurologist.  Patient said that maybe Dr. Ratliff  But wants to know who doc would recommend?        Call patient  [facility]  [phone]      Dx. Stroke (TA

## 2023-07-17 PROBLEM — Z00.00 MEDICARE ANNUAL WELLNESS VISIT, SUBSEQUENT: Status: RESOLVED | Noted: 2023-01-01 | Resolved: 2023-01-01

## 2023-07-24 NOTE — PHYSICIAN QUERY
PT Name: Denise Oleary  MR #: [id number]     DOCUMENTATION CLARIFICATION     CDS/: David Pal RN, BSN   Contact information: [email or web address]     This form is a permanent document in the medical record.    Query Date: July 24, 2023    By submitting this query, we are merely seeking further clarification of documentation.  Please utilize your independent clinical judgment when addressing the question(s) below.  The Medical Record contains the following:   Indicators   Supporting Clinical Findings Location in Medical Record   X Pneumonia documented Aspiration PNA: Low grade fever , leucocytosis- at risk of aspiration  --Patient noted to have low-grade fever with leukocytosis, with recent vomiting , at risk of aspiration            Presumed aspiration pneumonitis in the setting of recurrent vomiting     Hospital Medicine H&P 7/1/2023              [facility] PN 7/5/2023   X Chest X-Ray/CT Scan Imaging  Chest x-ray revealed no acute abnormality.   [facility] Medicine H&P 7/1/2023      PaO2    PaCO2     O2 sat     X WBC Lab 06/30/23  2112 07/01/23  0611 07/02/23  0527   WBC 15.93* 14.95* 13.30*        Hospital Medicine PN 7/2/2023     X Vital Signs vital signs in ED   /58, pulse 83, respirations 16, temperature 36.9° C, and SpO2 95% on room air.            VITAL SIGNS: 24 HRS MIN & MAX LAST   Temp  Min: 97.6 °F (36.4 °C)  Max: 98.3 °F (36.8 °C) 97.6 °F (36.4 °C)   BP  Min: 149/75  Max: 177/78 (!) 149/75   Pulse  Min: 83  Max: 94  94   Resp  Min: 18  Max: 20 18   SpO2  Min: 96 %  Max: 97 % 96 %          Hospital Medicine H&P 7/1/2023             [facility] Medicine PN 7/2/2023     Cultures      X Treatment  Begin IV Unasyn 3 g q.12, day 1 of 5 , check resp panel  SLP consulted, appreciate recommendations             Completed Unasyn x 3 days for presumed aspiration pneumonitis with normalization of WBC.     Hospital Medicine H&P 7/1/2023            [facility] PN 7/5/2023      Supplemental O2      X Dysphagia/Swallow study SLP cleared pt for soft, bite sized diet            General recommendations:  Modified Barium Swallow Study  Diet recommendations:  Soft & Bite Sized Diet - IDDSI Level 6, Liquid Diet Level: Thin liquids - IDDSI Level 0     --Per nursing, she reported having difficulty swallowing. Per chart, she was discharged from [facility] last month on a soft/bite-sized diet with thin liquids.  Pt and spouse report she has been regurgitating her foods, and that is has become more frequent in the past 2 weeks.     Assessment:      Throat clear after the swallow with ice chips; otherwise no overt s/sx of aspiration with PO trials.  Belching after few trials.  Oral care performed by pt before trials commenced.     Given c/o of diff swallowing, repeated hospitalizations for recent CVA, and throat clearing, an instrumental swallow assessment is indicated.     Hospital Medicine PN 7/5/2023            Speech therapy Evaluation 7/1/2023   C Other C/0 - nausea, vomiting, dysphagia for 2 weeks now   ------recently diagnosed with CVA on 7/21/2023 and was inpatient at [facility] with discharge on 6/25/2023.    Hospital Medicine H&P 7/1/2023         After study and due to the conflicting clinical picture, please clinically validate the diagnosis   of __ Aspiration Pneumonia____.      If validated, please provide additional clinical support for the diagnosis.       [   ] Above stated diagnosis is not confirmed and/or it has been ruled out     [   ] Above stated diagnosis is not confirmed and/or it has been ruled out, other diagnosis ruled in (please          specify):_______________     [ x  ] Above stated diagnosis is confirmed. Please specify clinical support (signs, symptoms, and treatment) for  the confirmed diagnosis: episodes of vomiting followed by low grade fever and leukocytosis and responded appropriately to antibiotic ____________________     [   ] Other clarification (please specify): ___________________            Please document in your progress notes daily for the duration of treatment, until resolved, and include in your discharge summary.     Form No. [id number]

## 2023-07-31 NOTE — PROGRESS NOTES
Neurology Office Visit  Neurology    Denise Oleary is a 83 y.o. female for stroke eval.  Stroke in June of this year.  Residual LHP.  Significant encephalopath post-stroke, which has persisted.  CT head last Monday showed no changes.  C/o pain in limbs since stroke (in addition to pain from fall in May of this year resulting in RLE Fx).    ROS:  Review of Systems   All other systems reviewed and are negative.     Past Medical History:   Diagnosis Date    Altered mental status     Anxiety and depression     Class 2 severe obesity due to excess calories with serious comorbidity and body mass index (BMI) of 35.0 to 35.9 in adult 4/12/2023    Gastroesophageal reflux disease without esophagitis 10/4/2022    Hypertension     Mixed hyperlipidemia     Primary insomnia     Restless leg syndrome     Stage 3b chronic kidney disease 10/4/2022     Past Surgical History:   Procedure Laterality Date    EGD, WITH CLOSED BIOPSY  7/3/2023    Procedure: EGD, WITH CLOSED BIOPSY;  Surgeon: Aayush Byrd MD;  Location: [facility] ENDOSCOPY;  Service: Gastroenterology;;    Excision of Pancreas      Excision of Stomach      Gastrointestinal Biopsy      HEMORRHOID SURGERY      TOTAL KNEE ARTHROPLASTY      TUBAL LIGATION       Family History   Family history unknown: Yes     Review of patient's allergies indicates:  No Known Allergies    Current Outpatient Medications:     ADULT LOW DOSE ASPIRIN 81 mg EC tablet, , Disp: , Rfl:     ALPRAZolam (XANAX) 0.25 MG tablet, Take 1 tablet (0.25 mg total) by mouth daily as needed for Anxiety., Disp: 5 tablet, Rfl: 0    amLODIPine (NORVASC) 10 MG tablet, Take 1 tablet (10 mg total) by mouth once daily., Disp: 30 tablet, Rfl: 0    atorvastatin (LIPITOR) 40 MG tablet, Take 1 tablet (40 mg total) by mouth every evening., Disp: 30 tablet, Rfl: 0    azelastine (ASTELIN) 137 mcg (0.1 %) nasal spray, 2 sprays once daily., Disp: , Rfl:     B12-levomefolate calcium-B6 (FOLBIC RF) 2-1.13-25 mg Tab tablet,  Take 1 tablet by mouth once daily., Disp: 30 tablet, Rfl: 0    benzonatate (TESSALON) 200 MG capsule, Take 200 mg by mouth 3 (three) times daily as needed for Cough., Disp: , Rfl:     blood-glucose meter,continuous (DEXCOM G6 ) Misc, 1 each by Misc.(Non-Drug; Combo Route) route every 14 (fourteen) days., Disp: 1 each, Rfl: 11    blood-glucose sensor (DEXCOM G6 SENSOR) Cee, 10 each by Misc.(Non-Drug; Combo Route) route every 14 (fourteen) days., Disp: 1 each, Rfl: 11    blood-glucose transmitter (DEXCOM G6 TRANSMITTER) Cee, 1 each by Misc.(Non-Drug; Combo Route) route every 14 (fourteen) days., Disp: 1 each, Rfl: 11    calcium-vitamin D3 (OS-MARIAJOSE 500 + D3) 500 mg-5 mcg (200 unit) per tablet, Take 1 tablet by mouth., Disp: , Rfl:     clopidogreL (PLAVIX) 75 mg tablet, Take 75 mg by mouth once daily., Disp: , Rfl:     CONTOUR NEXT EZ METER Misc, use as directed, Disp: , Rfl:     CONTOUR NEXT TEST STRIPS Strp, 1 strip by Misc.(Non-Drug; Combo Route) route 4 (four) times daily., Disp: 200 strip, Rfl: 6    docusate sodium (COLACE) 100 MG capsule, Take 1 capsule (100 mg total) by mouth 2 (two) times daily., Disp: 60 capsule, Rfl: 0    famotidine (PEPCID) 40 MG tablet, Take 40 mg by mouth nightly., Disp: , Rfl:     flash glucose scanning reader (FREESTYLE ANNE 2 READER) Misc, 1 each by Misc.(Non-Drug; Combo Route) route once daily., Disp: 1 each, Rfl: 0    flash glucose sensor (FREESTYLE ANNE 2 SENSOR) Kit, 1 each by Misc.(Non-Drug; Combo Route) route every 14 (fourteen) days., Disp: 4 kit, Rfl: 11    FLUoxetine 20 MG capsule, Take 60 mg by mouth., Disp: , Rfl:     fluticasone propionate (FLONASE) 50 mcg/actuation nasal spray, 2 sprays by Each Nostril route once daily., Disp: , Rfl:     gabapentin (NEURONTIN) 100 MG capsule, Take 1 capsule (100 mg total) by mouth every evening., Disp: 30 capsule, Rfl: 0    hydrALAZINE (APRESOLINE) 50 MG tablet, Take 1 tablet (50 mg total) by mouth every 8 (eight) hours., Disp: 90  tablet, Rfl: 0    insulin detemir U-100 (LEVEMIR) 100 unit/mL injection, Inject 20 Units into the skin every evening., Disp: , Rfl: 12    LEVEMIR FLEXPEN 100 unit/mL (3 mL) InPn pen, inject 75 UNITS subcutaneously DAILY, Disp: 15 mL, Rfl: 3    linaCLOtide (LINZESS) 145 mcg Cap capsule, Take 1 capsule (145 mcg total) by mouth before breakfast., Disp: 30 capsule, Rfl: 0    magnesium oxide (MAG-OX) 400 mg (241.3 mg magnesium) tablet, Take 1 tablet (400 mg total) by mouth once daily., Disp: 30 tablet, Rfl: 0    megestroL (MEGACE) 400 mg/10 mL (10 mL) Susp, Take 10 mLs (400 mg total) by mouth once daily., Disp: 300 mL, Rfl: 0    metFORMIN (GLUCOPHAGE) 500 MG tablet, TAKE TWO TABLETS BY MOUTH EVERY MORNING AND TWO TABLETS BY MOUTH AT BEDTIME, Disp: 120 tablet, Rfl: 12    metoprolol succinate (TOPROL-XL) 50 MG 24 hr tablet, TAKE ONE TABLET BY MOUTH TWICE DAILY, Disp: 60 tablet, Rfl: 4    MICROLET LANCET Misc, CHECK BLOOD SUGAR TWICE DAILY, Disp: , Rfl:     NOVOLOG FLEXPEN U-100 INSULIN 100 unit/mL (3 mL) InPn pen, Inject 10 Units into the skin 3 (three) times daily with meals. FOLLOW ISS, Disp: 3 each, Rfl: 3    nystatin (MYCOSTATIN) cream, Apply topically 2 (two) times daily., Disp: , Rfl:     olmesartan (BENICAR) 40 MG tablet, Take 1 tablet (40 mg total) by mouth once daily., Disp: 30 tablet, Rfl: 0    pantoprazole (PROTONIX) 40 MG tablet, Take 40 mg by mouth once daily., Disp: , Rfl:     QUEtiapine (SEROQUEL) 50 MG tablet, Take 1 tablet (50 mg total) by mouth after dinner., Disp: 30 tablet, Rfl: 0    rOPINIRole (REQUIP) 1 MG tablet, Take 1 tablet (1 mg total) by mouth daily as needed., Disp: 90 tablet, Rfl: 1    sucralfate (CARAFATE) 1 gram tablet, Take 1 g by mouth 4 (four) times daily., Disp: , Rfl:     thiamine 100 MG tablet, Take 1 tablet (100 mg total) by mouth once daily., Disp: 30 tablet, Rfl: 0    triamcinolone acetonide 0.1% (KENALOG) 0.1 % cream, Apply topically nightly., Disp: , Rfl:   Outpatient Medications  Marked as Taking for the 7/31/23 encounter (Office Visit) with Jackson Krishna MD   Medication Sig Dispense Refill    ADULT LOW DOSE ASPIRIN 81 mg EC tablet       ALPRAZolam (XANAX) 0.25 MG tablet Take 1 tablet (0.25 mg total) by mouth daily as needed for Anxiety. 5 tablet 0    amLODIPine (NORVASC) 10 MG tablet Take 1 tablet (10 mg total) by mouth once daily. 30 tablet 0    atorvastatin (LIPITOR) 40 MG tablet Take 1 tablet (40 mg total) by mouth every evening. 30 tablet 0    azelastine (ASTELIN) 137 mcg (0.1 %) nasal spray 2 sprays once daily.      B12-levomefolate calcium-B6 (FOLBIC RF) 2-1.13-25 mg Tab tablet Take 1 tablet by mouth once daily. 30 tablet 0    benzonatate (TESSALON) 200 MG capsule Take 200 mg by mouth 3 (three) times daily as needed for Cough.      blood-glucose meter,continuous (DEXCOM G6 ) Misc 1 each by Misc.(Non-Drug; Combo Route) route every 14 (fourteen) days. 1 each 11    blood-glucose sensor (DEXCOM G6 SENSOR) Cee 10 each by Misc.(Non-Drug; Combo Route) route every 14 (fourteen) days. 1 each 11    blood-glucose transmitter (DEXCOM G6 TRANSMITTER) Cee 1 each by Misc.(Non-Drug; Combo Route) route every 14 (fourteen) days. 1 each 11    calcium-vitamin D3 (OS-MARIAJOSE 500 + D3) 500 mg-5 mcg (200 unit) per tablet Take 1 tablet by mouth.      clopidogreL (PLAVIX) 75 mg tablet Take 75 mg by mouth once daily.      CONTOUR NEXT EZ METER Misc use as directed      CONTOUR NEXT TEST STRIPS Strp 1 strip by Misc.(Non-Drug; Combo Route) route 4 (four) times daily. 200 strip 6    docusate sodium (COLACE) 100 MG capsule Take 1 capsule (100 mg total) by mouth 2 (two) times daily. 60 capsule 0    famotidine (PEPCID) 40 MG tablet Take 40 mg by mouth nightly.      flash glucose scanning reader (FREESTYLE ANNE 2 READER) Misc 1 each by Misc.(Non-Drug; Combo Route) route once daily. 1 each 0    flash glucose sensor (FREESTYLE ANNE 2 SENSOR) Kit 1 each by Misc.(Non-Drug; Combo Route) route every 14  (fourteen) days. 4 kit 11    FLUoxetine 20 MG capsule Take 60 mg by mouth.      fluticasone propionate (FLONASE) 50 mcg/actuation nasal spray 2 sprays by Each Nostril route once daily.      gabapentin (NEURONTIN) 100 MG capsule Take 1 capsule (100 mg total) by mouth every evening. 30 capsule 0    hydrALAZINE (APRESOLINE) 50 MG tablet Take 1 tablet (50 mg total) by mouth every 8 (eight) hours. 90 tablet 0    insulin detemir U-100 (LEVEMIR) 100 unit/mL injection Inject 20 Units into the skin every evening.  12    LEVEMIR FLEXPEN 100 unit/mL (3 mL) InPn pen inject 75 UNITS subcutaneously DAILY 15 mL 3    linaCLOtide (LINZESS) 145 mcg Cap capsule Take 1 capsule (145 mcg total) by mouth before breakfast. 30 capsule 0    magnesium oxide (MAG-OX) 400 mg (241.3 mg magnesium) tablet Take 1 tablet (400 mg total) by mouth once daily. 30 tablet 0    megestroL (MEGACE) 400 mg/10 mL (10 mL) Susp Take 10 mLs (400 mg total) by mouth once daily. 300 mL 0    metFORMIN (GLUCOPHAGE) 500 MG tablet TAKE TWO TABLETS BY MOUTH EVERY MORNING AND TWO TABLETS BY MOUTH AT BEDTIME 120 tablet 12    metoprolol succinate (TOPROL-XL) 50 MG 24 hr tablet TAKE ONE TABLET BY MOUTH TWICE DAILY 60 tablet 4    MICROLET LANCET Misc CHECK BLOOD SUGAR TWICE DAILY      NOVOLOG FLEXPEN U-100 INSULIN 100 unit/mL (3 mL) InPn pen Inject 10 Units into the skin 3 (three) times daily with meals. FOLLOW ISS 3 each 3    nystatin (MYCOSTATIN) cream Apply topically 2 (two) times daily.      olmesartan (BENICAR) 40 MG tablet Take 1 tablet (40 mg total) by mouth once daily. 30 tablet 0    pantoprazole (PROTONIX) 40 MG tablet Take 40 mg by mouth once daily.      QUEtiapine (SEROQUEL) 50 MG tablet Take 1 tablet (50 mg total) by mouth after dinner. 30 tablet 0    rOPINIRole (REQUIP) 1 MG tablet Take 1 tablet (1 mg total) by mouth daily as needed. 90 tablet 1    sucralfate (CARAFATE) 1 gram tablet Take 1 g by mouth 4 (four) times daily.      thiamine 100 MG tablet Take 1  tablet (100 mg total) by mouth once daily. 30 tablet 0    triamcinolone acetonide 0.1% (KENALOG) 0.1 % cream Apply topically nightly.       Social History     Tobacco Use    Smoking status: Never    Smokeless tobacco: Never   Substance Use Topics    Alcohol use: Not Currently    Drug use: Never         Vitals:    07/31/23 1007   BP: (!) 140/82     Gen NAD  HEENT NC/AT  CV RRR, no carotid bruits  Resp clear  GI soft  Ext no C/C/E  Neuro  AAOx4  Speech fluent, dysarthric, tangential  EOMI, PERRLA, VFF, no papilledema  LUMN facial droop  Tongue and palate midline  Motor LHP  Sensation TTP LUE and LLE s/p stroke, RLE s/p Fx  DTRs symmetric  Coord intact  Gait wheelchair    Assessment: Stroke  Encephalopathy  Post-Stroke Neuropathic Pain    Plan: Increase GBP to 300mg TID  Baclofen 5mg TID  Increase Seroquel to 50mg BID

## (undated) DEVICE — FORCEP BX LG CAP 2.8MMX240CM

## (undated) DEVICE — TUBING O2 FEMALE CONN 13FT

## (undated) DEVICE — BAG LABGUARD BIOHAZARD 6X9IN

## (undated) DEVICE — CONTAINER SPECIMEN SCREW 4OZ

## (undated) DEVICE — SOL IRRI STRL WATER 1000ML

## (undated) DEVICE — KIT SURGICAL COLON .25 1.1OZ

## (undated) DEVICE — TIP SUCTION YANKAUER

## (undated) DEVICE — KIT CANIST SUCTION 1200CC

## (undated) DEVICE — FORCEP ALLIGATOR 2.8MM W/NDL

## (undated) DEVICE — COLLECTION SPECIMEN NEPTUNE